# Patient Record
Sex: MALE | Race: WHITE | NOT HISPANIC OR LATINO | Employment: OTHER | URBAN - METROPOLITAN AREA
[De-identification: names, ages, dates, MRNs, and addresses within clinical notes are randomized per-mention and may not be internally consistent; named-entity substitution may affect disease eponyms.]

---

## 2023-03-13 ENCOUNTER — HOSPITAL ENCOUNTER (EMERGENCY)
Facility: HOSPITAL | Age: 53
Discharge: HOME | End: 2023-03-13
Attending: EMERGENCY MEDICINE

## 2023-03-13 VITALS
DIASTOLIC BLOOD PRESSURE: 73 MMHG | HEIGHT: 67 IN | BODY MASS INDEX: 16.01 KG/M2 | HEART RATE: 97 BPM | OXYGEN SATURATION: 98 % | RESPIRATION RATE: 18 BRPM | WEIGHT: 102 LBS | SYSTOLIC BLOOD PRESSURE: 132 MMHG | TEMPERATURE: 98.7 F

## 2023-03-13 DIAGNOSIS — Z51.89 VISIT FOR WOUND CHECK: Primary | ICD-10-CM

## 2023-03-14 NOTE — ED PROVIDER NOTES
History  Chief Complaint   Patient presents with   • Wound Check     Patient presents to the ER with:  "I want to get the dressings changed on my sacrum "     Patient is a 50year old male who presents for a dressing change of chronic sacral wounds  Denies change in drainage or smell of wounds  Denies fevers, chills, fatigue, weakness  None       Past Medical History:   Diagnosis Date   • Osteomyelitis [facility])        Past Surgical History:   Procedure Laterality Date   • BELOW KNEE LEG AMPUTATION Left     March, 2022   • FEMUR FRACTURE SURGERY Right    • HIP ARTHROSCOPY Right    • LEG SURGERY Right     Tib,Fib   • SPINAL CORD DECOMPRESSION         History reviewed  No pertinent family history  I have reviewed and agree with the history as documented  E-Cigarette/Vaping   • E-Cigarette Use Never User      E-Cigarette/Vaping Substances     Social History     Tobacco Use   • Smoking status: Every Day     Types: Cigarettes   • Smokeless tobacco: Never   Vaping Use   • Vaping Use: Never used   Substance Use Topics   • Alcohol use: Never   • Drug use: Never       Review of Systems   Constitutional: Negative for chills and fever  Gastrointestinal: Negative for nausea and vomiting  Skin: Positive for wound  Negative for color change, pallor and rash  Physical Exam  Physical Exam  Vitals and nursing note reviewed  Constitutional:       General: He is not in acute distress  Appearance: Normal appearance  He is normal weight  He is ill-appearing (chronically ill appearing)  He is not toxic-appearing  HENT:      Head: Normocephalic and atraumatic  Nose: Nose normal       Mouth/Throat:      Mouth: Mucous membranes are moist    Eyes:      Extraocular Movements: Extraocular movements intact  Conjunctiva/sclera: Conjunctivae normal       Pupils: Pupils are equal, round, and reactive to light  Cardiovascular:      Rate and Rhythm: Normal rate and regular rhythm     Pulmonary:      Effort: Pulmonary effort is normal  No respiratory distress  Breath sounds: Normal breath sounds  No stridor  No wheezing, rhonchi or rales  Chest:      Chest wall: No tenderness  Abdominal:      General: Abdomen is flat  There is no distension  Tenderness: There is no abdominal tenderness  There is no guarding or rebound  Hernia: No hernia is present  Comments: Ostomy site clean and dry    Genitourinary:     Comments: See photos for wound description   Musculoskeletal:      Comments: S/p L BKA   Skin:     General: Skin is warm and dry  Neurological:      General: No focal deficit present  Mental Status: He is alert and oriented to person, place, and time  Mental status is at baseline  Psychiatric:         Mood and Affect: Mood normal          Behavior: Behavior normal              Vital Signs  ED Triage Vitals [03/13/23 1713]   Temperature Pulse Respirations Blood Pressure SpO2   98 7 °F (37 1 °C) 88 18 149/76 98 %      Temp Source Heart Rate Source Patient Position - Orthostatic VS BP Location FiO2 (%)   Oral Monitor Sitting Left arm --      Pain Score       4           Vitals:    03/13/23 1713 03/13/23 1958   BP: 149/76 132/73   Pulse: 88 97   Patient Position - Orthostatic VS: Sitting Sitting         Visual Acuity      ED Medications  Medications - No data to display    Diagnostic Studies  Results Reviewed     None                 No orders to display              Procedures  Procedures         ED Course                                             Medical Decision Making    Assessment and Plan:   Patient brought his own allevyn dressings  Wounds appear chronic  Do not appear to be actively infected  Photo in chart  Removed old dressing, RN cleaned up the wounds, and placed new dressings onto the 3 sacral wounds  Was also given a new urine leg bag for his catheter   Patient reports that he has a follow up appointment tomorrow at 1pm with the wound clinic which is encouraged he keep     Review of medical record shows that the patient has a past medical history significant for paraplegia, chronic sacral wounds, status post BKA, has a diverting colostomy  Review of note from 3/8/2023 from care everywhere at outside hospital shows that patient presented for wound care  He was discharged from SNF on 3/6 and is staying at a friend's house  While at the SNF he was getting wound care, but currently he is unable to get the wound dressing changed  He does have an appointment with wound clinic tomorrow, but needed dressings to be changed more urgently because the serous drainage had seeped through and saturated his clothes  Disposition  Final diagnoses:   Visit for wound check     Time reflects when diagnosis was documented in both MDM as applicable and the Disposition within this note     Time User Action Codes Description Comment    3/13/2023 10:45 PM Charlette Rivera Add [Z51 89] Visit for wound check       ED Disposition     ED Disposition   Discharge    Condition   Stable    Date/Time   Mon Mar 13, 2023 10:45 PM    Comment   Casi Gonzalez [facility] discharge to home/self care  Follow-up Information     Follow up With Specialties Details Why Contact Info Additional [address]   tomorrow for your scheduled appointment       [facility] Emergency Department Emergency Medicine Go to  As needed, If symptoms worsen, for re-evaluation [address] [place], [id number]  [address] [facility] Emergency Department, [address] [place], [place] [address]Yvonne malhotra [address]          Patient's Medications    No medications on file       No discharge procedures on file      PDMP Review     None          ED Provider  Electronically Signed by           Ash Ayers DO  03/13/23 4124

## 2023-03-14 NOTE — ED NOTES
Dressing to lower back and buttock cleaned and new mepilex applied       Babita Brito RN  03/13/23 0039

## 2023-03-23 ENCOUNTER — APPOINTMENT (EMERGENCY)
Dept: CT IMAGING | Facility: HOSPITAL | Age: 53
End: 2023-03-23

## 2023-03-23 ENCOUNTER — HOSPITAL ENCOUNTER (INPATIENT)
Facility: HOSPITAL | Age: 53
LOS: 7 days | Discharge: NURSING, REHAB OR LONG-TERM CARE | End: 2023-03-31
Attending: EMERGENCY MEDICINE | Admitting: STUDENT IN AN ORGANIZED HEALTH CARE EDUCATION/TRAINING PROGRAM

## 2023-03-23 ENCOUNTER — APPOINTMENT (EMERGENCY)
Dept: RADIOLOGY | Facility: HOSPITAL | Age: 53
End: 2023-03-23

## 2023-03-23 DIAGNOSIS — N30.90 CYSTITIS: Primary | ICD-10-CM

## 2023-03-23 DIAGNOSIS — A41.9 SEPSIS (HCC): ICD-10-CM

## 2023-03-23 DIAGNOSIS — S31.000D SACRAL WOUND, SUBSEQUENT ENCOUNTER: ICD-10-CM

## 2023-03-23 DIAGNOSIS — Z72.0 TOBACCO USE: ICD-10-CM

## 2023-03-23 DIAGNOSIS — L08.9 WOUND INFECTION: ICD-10-CM

## 2023-03-23 DIAGNOSIS — R63.6 UNDERWEIGHT: ICD-10-CM

## 2023-03-23 DIAGNOSIS — N12 PYELONEPHRITIS: ICD-10-CM

## 2023-03-23 DIAGNOSIS — L89.619 PRESSURE INJURY OF SKIN OF RIGHT HEEL, UNSPECIFIED INJURY STAGE: ICD-10-CM

## 2023-03-23 DIAGNOSIS — L89.159 SACRAL PRESSURE ULCER: ICD-10-CM

## 2023-03-23 DIAGNOSIS — T14.8XXA WOUND INFECTION: ICD-10-CM

## 2023-03-23 DIAGNOSIS — E46 PROTEIN-CALORIE MALNUTRITION, UNSPECIFIED SEVERITY (HCC): ICD-10-CM

## 2023-03-23 LAB
ALBUMIN SERPL BCP-MCNC: 3.1 G/DL (ref 3.5–5)
ALP SERPL-CCNC: 99 U/L (ref 34–104)
ALT SERPL W P-5'-P-CCNC: 5 U/L (ref 7–52)
ANION GAP SERPL CALCULATED.3IONS-SCNC: 9 MMOL/L (ref 4–13)
APTT PPP: 47 SECONDS (ref 23–37)
AST SERPL W P-5'-P-CCNC: 8 U/L (ref 13–39)
BACTERIA UR QL AUTO: ABNORMAL /HPF
BASOPHILS # BLD AUTO: 0.01 THOUSANDS/ÂΜL (ref 0–0.1)
BASOPHILS NFR BLD AUTO: 0 % (ref 0–1)
BILIRUB SERPL-MCNC: 0.29 MG/DL (ref 0.2–1)
BILIRUB UR QL STRIP: NEGATIVE
BUN SERPL-MCNC: 6 MG/DL (ref 5–25)
CALCIUM ALBUM COR SERPL-MCNC: 9.1 MG/DL (ref 8.3–10.1)
CALCIUM SERPL-MCNC: 8.4 MG/DL (ref 8.4–10.2)
CHLORIDE SERPL-SCNC: 98 MMOL/L (ref 96–108)
CLARITY UR: ABNORMAL
CO2 SERPL-SCNC: 23 MMOL/L (ref 21–32)
COLOR UR: YELLOW
CREAT SERPL-MCNC: 0.51 MG/DL (ref 0.6–1.3)
CRP SERPL QL: 129.3 MG/L
EOSINOPHIL # BLD AUTO: 0.07 THOUSAND/ÂΜL (ref 0–0.61)
EOSINOPHIL NFR BLD AUTO: 1 % (ref 0–6)
ERYTHROCYTE [DISTWIDTH] IN BLOOD BY AUTOMATED COUNT: 16.8 % (ref 11.6–15.1)
ERYTHROCYTE [SEDIMENTATION RATE] IN BLOOD: 100 MM/HOUR (ref 0–19)
GFR SERPL CREATININE-BSD FRML MDRD: 125 ML/MIN/1.73SQ M
GLUCOSE SERPL-MCNC: 120 MG/DL (ref 65–140)
GLUCOSE UR STRIP-MCNC: NEGATIVE MG/DL
HCT VFR BLD AUTO: 32 % (ref 36.5–49.3)
HGB BLD-MCNC: 10.7 G/DL (ref 12–17)
HGB UR QL STRIP.AUTO: ABNORMAL
IMM GRANULOCYTES # BLD AUTO: 0.09 THOUSAND/UL (ref 0–0.2)
IMM GRANULOCYTES NFR BLD AUTO: 1 % (ref 0–2)
INR PPP: 1.04 (ref 0.84–1.19)
KETONES UR STRIP-MCNC: ABNORMAL MG/DL
LACTATE SERPL-SCNC: 1 MMOL/L (ref 0.5–2)
LEUKOCYTE ESTERASE UR QL STRIP: ABNORMAL
LYMPHOCYTES # BLD AUTO: 1.42 THOUSANDS/ÂΜL (ref 0.6–4.47)
LYMPHOCYTES NFR BLD AUTO: 10 % (ref 14–44)
MCH RBC QN AUTO: 27.8 PG (ref 26.8–34.3)
MCHC RBC AUTO-ENTMCNC: 33.4 G/DL (ref 31.4–37.4)
MCV RBC AUTO: 83 FL (ref 82–98)
MONOCYTES # BLD AUTO: 0.87 THOUSAND/ÂΜL (ref 0.17–1.22)
MONOCYTES NFR BLD AUTO: 6 % (ref 4–12)
NEUTROPHILS # BLD AUTO: 12.37 THOUSANDS/ÂΜL (ref 1.85–7.62)
NEUTS SEG NFR BLD AUTO: 82 % (ref 43–75)
NITRITE UR QL STRIP: POSITIVE
NON-SQ EPI CELLS URNS QL MICRO: ABNORMAL /HPF
NRBC BLD AUTO-RTO: 0 /100 WBCS
PH UR STRIP.AUTO: 7.5 [PH]
PLATELET # BLD AUTO: 941 THOUSANDS/UL (ref 149–390)
PMV BLD AUTO: 7.8 FL (ref 8.9–12.7)
POTASSIUM SERPL-SCNC: 3.7 MMOL/L (ref 3.5–5.3)
PROCALCITONIN SERPL-MCNC: 50.61 NG/ML
PROT SERPL-MCNC: 7.2 G/DL (ref 6.4–8.4)
PROT UR STRIP-MCNC: ABNORMAL MG/DL
PROTHROMBIN TIME: 14.3 SECONDS (ref 11.6–14.5)
RBC # BLD AUTO: 3.85 MILLION/UL (ref 3.88–5.62)
RBC #/AREA URNS AUTO: ABNORMAL /HPF
SODIUM SERPL-SCNC: 130 MMOL/L (ref 135–147)
SP GR UR STRIP.AUTO: 1.02 (ref 1–1.03)
UROBILINOGEN UR STRIP-ACNC: <2 MG/DL
WBC # BLD AUTO: 14.83 THOUSAND/UL (ref 4.31–10.16)
WBC #/AREA URNS AUTO: ABNORMAL /HPF

## 2023-03-23 RX ORDER — HYDROMORPHONE HCL/PF 1 MG/ML
0.5 SYRINGE (ML) INJECTION ONCE
Status: COMPLETED | OUTPATIENT
Start: 2023-03-23 | End: 2023-03-23

## 2023-03-23 RX ADMIN — PIPERACILLIN SODIUM AND TAZOBACTAM SODIUM 3.38 G: 3; .375 INJECTION, POWDER, LYOPHILIZED, FOR SOLUTION INTRAVENOUS at 22:19

## 2023-03-23 RX ADMIN — HYDROMORPHONE HYDROCHLORIDE 0.5 MG: 1 INJECTION, SOLUTION INTRAMUSCULAR; INTRAVENOUS; SUBCUTANEOUS at 21:36

## 2023-03-23 RX ADMIN — IOHEXOL 100 ML: 350 INJECTION, SOLUTION INTRAVENOUS at 22:51

## 2023-03-23 RX ADMIN — VANCOMYCIN HYDROCHLORIDE 1250 MG: 5 INJECTION, POWDER, LYOPHILIZED, FOR SOLUTION INTRAVENOUS at 22:59

## 2023-03-23 RX ADMIN — SODIUM CHLORIDE 1000 ML: 0.9 INJECTION, SOLUTION INTRAVENOUS at 22:19

## 2023-03-24 PROBLEM — A41.9 SEPSIS WITHOUT ACUTE ORGAN DYSFUNCTION (HCC): Status: ACTIVE | Noted: 2023-03-24

## 2023-03-24 PROBLEM — G82.20 PARAPLEGIA (HCC): Status: ACTIVE | Noted: 2021-03-23

## 2023-03-24 PROBLEM — S31.000D SACRAL WOUND, SUBSEQUENT ENCOUNTER: Status: ACTIVE | Noted: 2023-03-24

## 2023-03-24 PROBLEM — N30.90 CYSTITIS: Status: ACTIVE | Noted: 2023-03-24

## 2023-03-24 PROBLEM — Z89.512 S/P BKA (BELOW KNEE AMPUTATION), LEFT (HCC): Status: ACTIVE | Noted: 2023-03-24

## 2023-03-24 PROBLEM — Z93.3 STATUS POST COLOSTOMY (HCC): Status: ACTIVE | Noted: 2021-11-27

## 2023-03-24 PROBLEM — G40.909 EPILEPSY (HCC): Status: ACTIVE | Noted: 2021-03-23

## 2023-03-24 PROBLEM — Z86.718 PERSONAL HISTORY OF VENOUS THROMBOSIS AND EMBOLISM: Status: ACTIVE | Noted: 2021-03-23

## 2023-03-24 PROBLEM — E87.1 HYPONATREMIA: Status: ACTIVE | Noted: 2022-03-05

## 2023-03-24 LAB
ANION GAP SERPL CALCULATED.3IONS-SCNC: 7 MMOL/L (ref 4–13)
ATRIAL RATE: 100 BPM
BUN SERPL-MCNC: 6 MG/DL (ref 5–25)
CALCIUM SERPL-MCNC: 7.7 MG/DL (ref 8.4–10.2)
CHLORIDE SERPL-SCNC: 103 MMOL/L (ref 96–108)
CO2 SERPL-SCNC: 21 MMOL/L (ref 21–32)
CREAT SERPL-MCNC: 0.47 MG/DL (ref 0.6–1.3)
ERYTHROCYTE [DISTWIDTH] IN BLOOD BY AUTOMATED COUNT: 17.2 % (ref 11.6–15.1)
GFR SERPL CREATININE-BSD FRML MDRD: 129 ML/MIN/1.73SQ M
GLUCOSE SERPL-MCNC: 95 MG/DL (ref 65–140)
HCT VFR BLD AUTO: 28.1 % (ref 36.5–49.3)
HGB BLD-MCNC: 9.1 G/DL (ref 12–17)
MAGNESIUM SERPL-MCNC: 1.9 MG/DL (ref 1.9–2.7)
MCH RBC QN AUTO: 27.6 PG (ref 26.8–34.3)
MCHC RBC AUTO-ENTMCNC: 32.4 G/DL (ref 31.4–37.4)
MCV RBC AUTO: 85 FL (ref 82–98)
P AXIS: 49 DEGREES
PLATELET # BLD AUTO: 822 THOUSANDS/UL (ref 149–390)
PMV BLD AUTO: 7.9 FL (ref 8.9–12.7)
POTASSIUM SERPL-SCNC: 3.5 MMOL/L (ref 3.5–5.3)
PR INTERVAL: 104 MS
PROCALCITONIN SERPL-MCNC: 43.37 NG/ML
QRS AXIS: 82 DEGREES
QRSD INTERVAL: 82 MS
QT INTERVAL: 358 MS
QTC INTERVAL: 461 MS
RBC # BLD AUTO: 3.3 MILLION/UL (ref 3.88–5.62)
SODIUM SERPL-SCNC: 131 MMOL/L (ref 135–147)
T WAVE AXIS: 60 DEGREES
VENTRICULAR RATE: 100 BPM
WBC # BLD AUTO: 11.67 THOUSAND/UL (ref 4.31–10.16)

## 2023-03-24 RX ORDER — ENOXAPARIN SODIUM 100 MG/ML
30 INJECTION SUBCUTANEOUS DAILY
Status: DISCONTINUED | OUTPATIENT
Start: 2023-03-24 | End: 2023-03-31 | Stop reason: HOSPADM

## 2023-03-24 RX ORDER — SENNOSIDES 8.6 MG
1 TABLET ORAL
Status: DISCONTINUED | OUTPATIENT
Start: 2023-03-24 | End: 2023-03-31 | Stop reason: HOSPADM

## 2023-03-24 RX ORDER — ONDANSETRON 2 MG/ML
4 INJECTION INTRAMUSCULAR; INTRAVENOUS EVERY 4 HOURS PRN
Status: DISCONTINUED | OUTPATIENT
Start: 2023-03-24 | End: 2023-03-31 | Stop reason: HOSPADM

## 2023-03-24 RX ORDER — CEFEPIME HYDROCHLORIDE 2 G/50ML
2000 INJECTION, SOLUTION INTRAVENOUS EVERY 12 HOURS
Status: DISCONTINUED | OUTPATIENT
Start: 2023-03-24 | End: 2023-03-26

## 2023-03-24 RX ORDER — VANCOMYCIN HYDROCHLORIDE 1 G/200ML
1000 INJECTION, SOLUTION INTRAVENOUS EVERY 12 HOURS
Status: DISCONTINUED | OUTPATIENT
Start: 2023-03-24 | End: 2023-03-26

## 2023-03-24 RX ORDER — OXYCODONE HYDROCHLORIDE 5 MG/1
5 TABLET ORAL EVERY 4 HOURS PRN
Status: DISCONTINUED | OUTPATIENT
Start: 2023-03-24 | End: 2023-03-31 | Stop reason: HOSPADM

## 2023-03-24 RX ORDER — ONDANSETRON 2 MG/ML
4 INJECTION INTRAMUSCULAR; INTRAVENOUS EVERY 6 HOURS PRN
Status: DISCONTINUED | OUTPATIENT
Start: 2023-03-24 | End: 2023-03-24

## 2023-03-24 RX ORDER — OXYCODONE HYDROCHLORIDE 5 MG/1
5 TABLET ORAL EVERY 6 HOURS PRN
Status: DISCONTINUED | OUTPATIENT
Start: 2023-03-24 | End: 2023-03-24

## 2023-03-24 RX ORDER — MAGNESIUM HYDROXIDE/ALUMINUM HYDROXICE/SIMETHICONE 120; 1200; 1200 MG/30ML; MG/30ML; MG/30ML
30 SUSPENSION ORAL EVERY 6 HOURS PRN
Status: DISCONTINUED | OUTPATIENT
Start: 2023-03-24 | End: 2023-03-31 | Stop reason: HOSPADM

## 2023-03-24 RX ORDER — HYDROMORPHONE HCL/PF 1 MG/ML
0.5 SYRINGE (ML) INJECTION
Status: DISCONTINUED | OUTPATIENT
Start: 2023-03-24 | End: 2023-03-31 | Stop reason: HOSPADM

## 2023-03-24 RX ORDER — ACETAMINOPHEN 325 MG/1
650 TABLET ORAL EVERY 6 HOURS PRN
Status: DISCONTINUED | OUTPATIENT
Start: 2023-03-24 | End: 2023-03-31 | Stop reason: HOSPADM

## 2023-03-24 RX ADMIN — Medication 3.38 G: at 10:16

## 2023-03-24 RX ADMIN — Medication 3.38 G: at 03:22

## 2023-03-24 RX ADMIN — OXYCODONE HYDROCHLORIDE 5 MG: 5 TABLET ORAL at 04:15

## 2023-03-24 RX ADMIN — VANCOMYCIN HYDROCHLORIDE 1000 MG: 1 INJECTION, SOLUTION INTRAVENOUS at 11:13

## 2023-03-24 RX ADMIN — OXYCODONE HYDROCHLORIDE 5 MG: 5 TABLET ORAL at 10:18

## 2023-03-24 RX ADMIN — ENOXAPARIN SODIUM 30 MG: 100 INJECTION SUBCUTANEOUS at 09:07

## 2023-03-24 RX ADMIN — CEFEPIME HYDROCHLORIDE 2000 MG: 2 INJECTION, SOLUTION INTRAVENOUS at 16:42

## 2023-03-24 RX ADMIN — COLLAGENASE SANTYL: 250 OINTMENT TOPICAL at 13:16

## 2023-03-24 RX ADMIN — OXYCODONE HYDROCHLORIDE 5 MG: 5 TABLET ORAL at 16:40

## 2023-03-24 NOTE — PROGRESS NOTES
Chelsea Martínez is a 50 y o  male who is currently ordered Vancomycin IV with management by the Pharmacy Consult service  Relevant clinical data and objective / subjective history reviewed  Vancomycin Assessment:  Indication and Goal AUC/Trough: Soft tissue (goal -600, trough >10)  Clinical Status:  New  Micro:   Pending  Renal Function:  SCr: 0 51 mg/dL  CrCl: 116 7 mL/min  Renal replacement: Not on dialysis  Days of Therapy: 1  Current Dose: 1250mg once loading dose  Vancomycin Plan:  New Dosinmg every 12 hours  Estimated AUC: 456 mcg*hr/mL  Estimated Trough: 12 mcg/mL  Next Level: 3/25/23 at 0600  Renal Function Monitoring: Daily BMP and [place] will continue to follow closely for s/sx of nephrotoxicity, infusion reactions and appropriateness of therapy  BMP and CBC will be ordered per protocol  We will continue to follow the patient’s culture results and clinical progress daily  Also, zosyn will be adjusted renally if necessary      Alex Martin, Pharmacist, PharmD, BCPS

## 2023-03-24 NOTE — ASSESSMENT & PLAN NOTE
· Status post work injury  · Fairly independent with self-reported wheelchair and handicap vehicle  · Unable to care for sacral wounds himself at home, looking for placement until wounds are healed  · CM consulted

## 2023-03-24 NOTE — CONSULTS
Consultation - General Surgery   Marivel Deion Yovanny 50 y o  male MRN: [id number]  Unit/Bed#: -01 Encounter: [id number]    Assessment/Plan      Multiple sacral decubitus ulcers, Stage 4  -Wounds evaluated at bedside today, wounds do probe to bone, midline sacral, left ischial and right ischial wounds, areas with pink tissue and serous drainage, thick slough at base, wiped with 4 x 4, no areas of necrosis requiring debridement (see pictures in chart)  -Likely chronic osteomyelitis as confirmed by CT scan  -Would add Santyl to wound bases, Maxorb for absorption of drainage, continue Allevyn padding  -Patient is being evaluated for SNF placement, would continue Santyl for enzymatic debridement of wounds at this time, can consider VAC placement as per wound care service at nursing facility  -Patient not likely a candidate for plastics revision/flap as patient is paraplegic, difficulty with offloading to the areas, and does have osteomyelitis  -continue to monitor wounds, [address] as ordered    Cystitis  -Patient uses condom catheter  -CT with bladder wall thickening and enhancement of the left renal pelvis and ureter concerning for cystitis and ascending ureteritis/infection  -Antibiotics per primary service    Sepsis  -Likely related to cystitis  -Sacral wounds do not appear infected, osteomyelitis likely chronic  -Antibiotics per primary service  -Follow blood culture    Underweight, malnourished  -Patient weighs 88 pounds  -Place nutrition consult for optimization  -Patient needs adequate calories and protein for optimal healing    Social issues  -Patient is homeless with difficulty caring for self  -CM consult    Paraplegia, L BKA, colostomy in place, IVC filter, Anemia          History of Present Illness     HPI:  Guido Robbins is a 50 y o  male with PMH of paraplegia, left BKA, colostomy, IVC filter, and chronic multiple pressure wounds who presents after recent discharge from rehab facility with plaints of worsening sacral wounds and drainage over the past week  He is reportedly homeless and has been living in his car  States he is unable to perform dressing changes himself  He denies any fevers or chills, no nausea or vomiting  Denies any issues with ostomy  No issues with urination, patient does use a condom catheter  Inpatient consult to Acute Care Surgery  Consult performed by: Maral Contreras PA-C  Consult ordered by: Alexa Walker PA-C          Review of Systems   Constitutional: Negative  Negative for appetite change, fever and unexpected weight change  HENT: Negative  Eyes: Negative  Respiratory: Negative  Negative for cough and shortness of breath  Cardiovascular: Negative  Negative for chest pain and palpitations  Gastrointestinal: Negative  Negative for abdominal pain, diarrhea, nausea and vomiting  Endocrine: Negative  Genitourinary: Negative  Negative for difficulty urinating and hematuria  Musculoskeletal: Positive for arthralgias, back pain and gait problem  Paraplegia   Skin: Positive for wound  Allergic/Immunologic: Negative  Neurological: Negative for weakness and light-headedness  Hematological: Negative  Does not bruise/bleed easily  Psychiatric/Behavioral: Negative  All other systems reviewed and are negative        Historical Information   Past Medical History:   Diagnosis Date   • Osteomyelitis [facility])      Past Surgical History:   Procedure Laterality Date   • BELOW KNEE LEG AMPUTATION Left     March, 2022   • FEMUR FRACTURE SURGERY Right    • HIP ARTHROSCOPY Right    • LEG SURGERY Right     Tib,Fib   • SPINAL CORD DECOMPRESSION       Social History   Social History     Substance and Sexual Activity   Alcohol Use Never     Social History     Substance and Sexual Activity   Drug Use Never     E-Cigarette/Vaping   • E-Cigarette Use Never User      E-Cigarette/Vaping Substances     Social History     Tobacco Use   Smoking Status Every Day "  • Types: Cigarettes   • Passive exposure: Never   Smokeless Tobacco Never     Family History: non-contributory    Meds/Allergies   all current active meds have been reviewed  Allergies   Allergen Reactions   • Levaquin [Levofloxacin] Rash   • Morphine Rash       Objective   First Vitals:   Blood Pressure: 125/70 (03/23/23 1755)  Pulse: (!) 112 (03/23/23 1755)  Temperature: 98 1 °F (36 7 °C) (03/23/23 1755)  Temp Source: Temporal (03/23/23 1755)  Respirations: 20 (03/23/23 1755)  Height: 5' 7\" (170 2 cm) (03/23/23 1755)  Weight - Scale: 47 6 kg (105 lb) (03/23/23 1755)  SpO2: 98 % (03/23/23 1755)    Current Vitals:   Blood Pressure: 91/55 (03/24/23 0700)  Pulse: 78 (03/24/23 0700)  Temperature: 98 8 °F (37 1 °C) (03/24/23 0700)  Temp Source: Temporal (03/23/23 1755)  Respirations: 14 (03/24/23 0700)  Height: 5' 7\" (170 2 cm) (03/23/23 1755)  Weight - Scale: 40 2 kg (88 lb 10 oz) (03/24/23 0300)  SpO2: 97 % (03/24/23 0700)      Intake/Output Summary (Last 24 hours) at 3/24/2023 0920  Last data filed at 3/24/2023 0102  Gross per 24 hour   Intake 1100 ml   Output --   Net 1100 ml       Invasive Devices     Peripheral Intravenous Line  Duration           Peripheral IV 03/23/23 Dorsal (posterior); Left Forearm <1 day    Peripheral IV 03/23/23 Dorsal (posterior); Left Hand <1 day          Drain  Duration           Ileostomy LLQ -- days                Physical Exam  Constitutional:       General: He is not in acute distress  Appearance: He is well-developed  He is not diaphoretic  Comments: Tachypneic, frail-appearing, appears older than stated age   HENT:      Head: Normocephalic and atraumatic  Mouth/Throat:      Pharynx: No oropharyngeal exudate  Eyes:      General: No scleral icterus  Right eye: No discharge  Left eye: No discharge  Neck:      Thyroid: No thyromegaly  Vascular: No JVD  Trachea: No tracheal deviation     Cardiovascular:      Rate and Rhythm: Normal rate and " regular rhythm  Heart sounds: Normal heart sounds  No murmur heard  Pulmonary:      Effort: Pulmonary effort is normal  No respiratory distress  Breath sounds: Normal breath sounds  No wheezing  Abdominal:      General: Bowel sounds are normal  There is no distension  Palpations: Abdomen is soft  Tenderness: There is no abdominal tenderness  Musculoskeletal:         General: No deformity  Cervical back: Normal range of motion and neck supple  Comments: Paraplegia with contracted lower extremities, BKA   Skin:     General: Skin is warm and dry  Comments: Multiple sacral wounds, midline area and over ischial areas, serous drainage with slough at base, otherwise tissues are pink without necrosis, no purulence, no induration or fluctuance   Neurological:      Mental Status: He is alert and oriented to person, place, and time  Comments: No focal deficits   Psychiatric:         Behavior: Behavior normal          Lab Results:   I have personally reviewed pertinent lab results  , CBC:   Lab Results   Component Value Date    WBC 11 67 (H) 03/24/2023    HGB 9 1 (L) 03/24/2023    HCT 28 1 (L) 03/24/2023    MCV 85 03/24/2023     (H) 03/24/2023    MCH 27 6 03/24/2023    MCHC 32 4 03/24/2023    RDW 17 2 (H) 03/24/2023    MPV 7 9 (L) 03/24/2023    NRBC 0 03/23/2023   , CMP:   Lab Results   Component Value Date    SODIUM 131 (L) 03/24/2023    K 3 5 03/24/2023     03/24/2023    CO2 21 03/24/2023    BUN 6 03/24/2023    CREATININE 0 47 (L) 03/24/2023    CALCIUM 7 7 (L) 03/24/2023    AST 8 (L) 03/23/2023    ALT 5 (L) 03/23/2023    ALKPHOS 99 03/23/2023    EGFR 129 03/24/2023     Imaging: I have personally reviewed pertinent reports  EKG, Pathology, and Other Studies: I have personally reviewed pertinent reports  Counseling / Coordination of Care  Total floor / unit time spent today 30 minutes    Greater than 50% of total time was spent with the patient and / or family counseling and / or coordination of care    A description of the counseling / coordination of care    [facility] Lonnie

## 2023-03-24 NOTE — PLAN OF CARE
Problem: MOBILITY - ADULT  Goal: Maintain or return to baseline ADL function  Description: INTERVENTIONS:  -  Assess patient's ability to carry out ADLs; assess patient's baseline for ADL function and identify physical deficits which impact ability to perform ADLs (bathing, care of mouth/teeth, toileting, grooming, dressing, etc )  - Assess/evaluate cause of self-care deficits   - Assess range of motion  - Assess patient's mobility; develop plan if impaired  - Assess patient's need for assistive devices and provide as appropriate  - Encourage maximum independence but intervene and supervise when necessary  - Involve family in performance of ADLs  - Assess for home care needs following discharge   - Consider OT consult to assist with ADL evaluation and planning for discharge  - Provide patient education as appropriate  Outcome: Progressing  Goal: Maintains/Returns to pre admission functional level  Description: INTERVENTIONS:  - Perform BMAT or MOVE assessment daily    - Set and communicate daily mobility goal to care team and patient/family/caregiver  - Collaborate with rehabilitation services on mobility goals if consulted  - Perform Range of Motion 3 times a day  - Reposition patient every 3 hours    - Dangle patient 3 times a day  - Stand patient 3 times a day  - Ambulate patient 3 times a day  - Out of bed to chair 3 times a day   - Out of bed for meals 3 times a day  - Out of bed for toileting  - Record patient progress and toleration of activity level   Outcome: Progressing     Problem: PAIN - ADULT  Goal: Verbalizes/displays adequate comfort level or baseline comfort level  Description: Interventions:  - Encourage patient to monitor pain and request assistance  - Assess pain using appropriate pain scale  - Administer analgesics based on type and severity of pain and evaluate response  - Implement non-pharmacological measures as appropriate and evaluate response  - Consider cultural and social influences on pain and pain management  - Notify physician/advanced practitioner if interventions unsuccessful or patient reports new pain  Outcome: Progressing     Problem: INFECTION - ADULT  Goal: Absence or prevention of progression during hospitalization  Description: INTERVENTIONS:  - Assess and monitor for signs and symptoms of infection  - Monitor lab/diagnostic results  - Monitor all insertion sites, i e  indwelling lines, tubes, and drains  - Monitor endotracheal if appropriate and nasal secretions for changes in amount and color  - [place] appropriate cooling/warming therapies per order  - Administer medications as ordered  - Instruct and encourage patient and family to use good hand hygiene technique  - Identify and instruct in appropriate isolation precautions for identified infection/condition  Outcome: Progressing  Goal: Absence of fever/infection during neutropenic period  Description: INTERVENTIONS:  - Monitor WBC    Outcome: Progressing     Problem: SAFETY ADULT  Goal: Maintain or return to baseline ADL function  Description: INTERVENTIONS:  -  Assess patient's ability to carry out ADLs; assess patient's baseline for ADL function and identify physical deficits which impact ability to perform ADLs (bathing, care of mouth/teeth, toileting, grooming, dressing, etc )  - Assess/evaluate cause of self-care deficits   - Assess range of motion  - Assess patient's mobility; develop plan if impaired  - Assess patient's need for assistive devices and provide as appropriate  - Encourage maximum independence but intervene and supervise when necessary  - Involve family in performance of ADLs  - Assess for home care needs following discharge   - Consider OT consult to assist with ADL evaluation and planning for discharge  - Provide patient education as appropriate  Outcome: Progressing  Goal: Maintains/Returns to pre admission functional level  Description: INTERVENTIONS:  - Perform BMAT or MOVE assessment daily    - Set and communicate daily mobility goal to care team and patient/family/caregiver  - Collaborate with rehabilitation services on mobility goals if consulted  - Perform Range of Motion 3 times a day  - Reposition patient every 3 hours    - Dangle patient 3 times a day  - Stand patient 3 times a day  - Ambulate patient 3 times a day  - Out of bed to chair 3 times a day   - Out of bed for meals 3 times a day  - Out of bed for toileting  - Record patient progress and toleration of activity level   Outcome: Progressing  Goal: Patient will remain free of falls  Description: INTERVENTIONS:  - Educate patient/family on patient safety including physical limitations  - Instruct patient to call for assistance with activity   - Consult OT/PT to assist with strengthening/mobility   - Keep Call bell within reach  - Keep bed low and locked with side rails adjusted as appropriate  - Keep care items and personal belongings within reach  - Initiate and maintain comfort rounds  - Make Fall Risk Sign visible to staff  - Offer Toileting every 2 Hours, in advance of need  - Initiate/Maintain alarm  - Obtain necessary fall risk management equipment  - Apply yellow socks and bracelet for high fall risk patients  - Consider moving patient to room near nurses station  Outcome: Progressing     Problem: DISCHARGE PLANNING  Goal: Discharge to home or other facility with appropriate resources  Description: INTERVENTIONS:  - Identify barriers to discharge w/patient and caregiver  - Arrange for needed discharge resources and transportation as appropriate  - Identify discharge learning needs (meds, wound care, etc )  - Arrange for interpretive services to assist at discharge as needed  - Refer to Case Management Department for coordinating discharge planning if the patient needs post-hospital services based on physician/advanced practitioner order or complex needs related to functional status, cognitive ability, or social support system  Outcome: Progressing     Problem: Knowledge Deficit  Goal: Patient/family/caregiver demonstrates understanding of disease process, treatment plan, medications, and discharge instructions  Description: Complete learning assessment and assess knowledge base  Interventions:  - Provide teaching at level of understanding  - Provide teaching via preferred learning methods  Outcome: Progressing     Problem: Prexisting or High Potential for Compromised Skin Integrity  Goal: Skin integrity is maintained or improved  Description: INTERVENTIONS:  - Identify patients at risk for skin breakdown  - Assess and monitor skin integrity  - Assess and monitor nutrition and hydration status  - Monitor labs   - Assess for incontinence   - Turn and reposition patient  - Assist with mobility/ambulation  - Relieve pressure over bony prominences  - Avoid friction and shearing  - Provide appropriate hygiene as needed including keeping skin clean and dry  - Evaluate need for skin moisturizer/barrier cream  - Collaborate with interdisciplinary team   - Patient/family teaching  - Consider wound care consult   Outcome: Progressing     Problem: Nutrition/Hydration-ADULT  Goal: Nutrient/Hydration intake appropriate for improving, restoring or maintaining nutritional needs  Description: Monitor and assess patient's nutrition/hydration status for malnutrition  Collaborate with interdisciplinary team and initiate plan and interventions as ordered  Monitor patient's weight and dietary intake as ordered or per policy  Utilize nutrition screening tool and intervene as necessary  Determine patient's food preferences and provide high-protein, high-caloric foods as appropriate       INTERVENTIONS:  - Monitor oral intake, urinary output, labs, and treatment plans  - Assess nutrition and hydration status and recommend course of action  - Evaluate amount of meals eaten  - Assist patient with eating if necessary   - Allow adequate time for meals  - Recommend/ encourage appropriate diets, oral nutritional supplements, and vitamin/mineral supplements  - Order, calculate, and assess calorie counts as needed  - Recommend, monitor, and adjust tube feedings and TPN/PPN based on assessed needs  - Assess need for intravenous fluids  - Provide specific nutrition/hydration education as appropriate  - Include patient/family/caregiver in decisions related to nutrition  Outcome: Progressing

## 2023-03-24 NOTE — ASSESSMENT & PLAN NOTE
· Performed at [facility] 3/14/2022 by Dr Tee Arellano for definitive management of decubitus ulcer  · Presents with ulcer to RLE  · Podiatry consulted for lower extremity wounds

## 2023-03-24 NOTE — ASSESSMENT & PLAN NOTE
· SIRS criteria met on admission: Tachycardia and leukocytosis  · Suspected sources cystitis, as well as infected sacral wounds  · No severe sepsis criteria met  · Procalcitonin 50, recheck in a m    · Continue vancomycin and Zosyn for now  · Blood cultures x2, pending

## 2023-03-24 NOTE — ED CARE HANDOFF
Emergency Department Sign Out Note        Sign out and transfer of care from Dr Nataly Crocker  See Separate Emergency Department note  The patient, Trupti Yates, was evaluated by the previous provider for wounds      Workup Completed:  XR chest portable    (Results Pending)   CT abdomen pelvis with contrast    (Results Pending)      Labs Reviewed   CBC AND DIFFERENTIAL - Abnormal       Result Value Ref Range Status    WBC 14 83 (*) 4 31 - 10 16 Thousand/uL Final    RBC 3 85 (*) 3 88 - 5 62 Million/uL Final    Hemoglobin 10 7 (*) 12 0 - 17 0 g/dL Final    Hematocrit 32 0 (*) 36 5 - 49 3 % Final    MCV 83  82 - 98 fL Final    MCH 27 8  26 8 - 34 3 pg Final    MCHC 33 4  31 4 - 37 4 g/dL Final    RDW 16 8 (*) 11 6 - 15 1 % Final    MPV 7 8 (*) 8 9 - 12 7 fL Final    Platelets 328 (*) 450 - 390 Thousands/uL Final    nRBC 0  /100 WBCs Final    Neutrophils Relative 82 (*) 43 - 75 % Final    Immat GRANS % 1  0 - 2 % Final    Lymphocytes Relative 10 (*) 14 - 44 % Final    Monocytes Relative 6  4 - 12 % Final    Eosinophils Relative 1  0 - 6 % Final    Basophils Relative 0  0 - 1 % Final    Neutrophils Absolute 12 37 (*) 1 85 - 7 62 Thousands/µL Final    Immature Grans Absolute 0 09  0 00 - 0 20 Thousand/uL Final    Lymphocytes Absolute 1 42  0 60 - 4 47 Thousands/µL Final    Monocytes Absolute 0 87  0 17 - 1 22 Thousand/µL Final    Eosinophils Absolute 0 07  0 00 - 0 61 Thousand/µL Final    Basophils Absolute 0 01  0 00 - 0 10 Thousands/µL Final   COMPREHENSIVE METABOLIC PANEL - Abnormal    Sodium 130 (*) 135 - 147 mmol/L Final    Potassium 3 7  3 5 - 5 3 mmol/L Final    Chloride 98  96 - 108 mmol/L Final    CO2 23  21 - 32 mmol/L Final    ANION GAP 9  4 - 13 mmol/L Final    BUN 6  5 - 25 mg/dL Final    Creatinine 0 51 (*) 0 60 - 1 30 mg/dL Final    Comment: Standardized to IDMS reference method    Glucose 120  65 - 140 mg/dL Final    Comment: If the patient is fasting, the ADA then defines impaired fasting glucose as > 100 mg/dL and diabetes as > or equal to 123 mg/dL  Calcium 8 4  8 4 - 10 2 mg/dL Final    Corrected Calcium 9 1  8 3 - 10 1 mg/dL Final    AST 8 (*) 13 - 39 U/L Final    ALT 5 (*) 7 - 52 U/L Final    Comment: Specimen collection should occur prior to Sulfasalazine administration due to the potential for falsely depressed results  Alkaline Phosphatase 99  34 - 104 U/L Final    Total Protein 7 2  6 4 - 8 4 g/dL Final    Albumin 3 1 (*) 3 5 - 5 0 g/dL Final    Total Bilirubin 0 29  0 20 - 1 00 mg/dL Final    eGFR 125  ml/min/1 73sq m Final    Narrative:     [place] guidelines for Chronic Kidney Disease (CKD):   •  Stage 1 with normal or high GFR (GFR > 90 mL/min/1 73 square meters)  •  Stage 2 Mild CKD (GFR = 60-89 mL/min/1 73 square meters)  •  Stage 3A Moderate CKD (GFR = 45-59 mL/min/1 73 square meters)  •  Stage 3B Moderate CKD (GFR = 30-44 mL/min/1 73 square meters)  •  Stage 4 Severe CKD (GFR = 15-29 mL/min/1 73 square meters)  •  Stage 5 End Stage CKD (GFR <15 mL/min/1 73 square meters)  Note: GFR calculation is accurate only with a steady state creatinine   PROCALCITONIN TEST - Abnormal    Procalcitonin 50 61 (*) <=0 25 ng/ml Final    Comment: Suspected Lower Respiratory Tract Infection (LRTI):  - LESS than or EQUAL to 0 25 ng/mL:   low likelihood for bacterial LRTI; antibiotics DISCOURAGED   - GREATER than 0 25 ng/mL:   increased likelihood for bacterial LRTI; antibiotics ENCOURAGED  Suspected Sepsis:  - Strongly consider initiating antibiotics in ALL UNSTABLE patients  - LESS than or EQUAL to 0 5 ng/mL:   low likelihood for bacterial sepsis; antibiotics DISCOURAGED   - GREATER than 0 5 ng/mL:   increased likelihood for bacterial sepsis; antibiotics ENCOURAGED   - GREATER than 2 ng/mL:   high risk for severe sepsis / septic shock; antibiotics strongly ENCOURAGED  Decisions on antibiotic use should not be based solely on Procalcitonin (PCT) levels   If PCT is low but uncertainty exists with stopping antibiotics, repeat PCT in 6-24 hours to confirm the low level  If antibiotics are administered (regardless if initial PCT was high or low), repeat PCT every 1-2 days to consider early antibiotic cessation (when GREATER than 80% decrease from the peak OR when PCT drops below designated cutoffs, whichever comes first), so long as the infection is NOT one that typically requires prolonged treatment durations (e g , bone/joint infections, endocarditis, Staph  aureus bacteremia)      Situations of FALSE-POSITIVE Procalcitonin values:  1) Newborns < 67 hours old  2) Massive stress from severe trauma / burns, major surgery, acute pancreatitis, cardiogenic / hemorrhagic shock, sickle cell crisis, or other organ perfusion abnormalities  3) Malaria and some Candidal infections  4) Treatment with agents that stimulate cytokines (e g , OKT3, anti-lymphocyte globulins, alemtuzumab, IL-2, granulocyte transfusion [NOT GCSFs])  5) Chronic renal disease causes elevated baseline levels (consider GREATER than 0 75 ng/mL as an abnormal cut-off); initiating HD/CRRT may cause transient decreases  6) Paraneoplastic syndromes from medullary thyroid or SCLC, some forms of vasculitis, and acute [id number] disease    Situations of FALSE-NEGATIVE Procalcitonin values:  1) Too early in clinical course for PCT to have reached its peak (may repeat in 6-24 hours to confirm low level)  2) Localized infection WITHOUT systemic (SIRS / sepsis) response (e g , an abscess, osteomyelitis, cystitis)  3) Mycobacteria (e g , Tuberculosis, MAC)  4) Cystic fibrosis exacerbations     APTT - Abnormal    PTT 47 (*) 23 - 37 seconds Final    Comment: Therapeutic Heparin Range =  60-90 seconds   UA W REFLEX TO MICROSCOPIC WITH REFLEX TO CULTURE - Abnormal    Color, UA Yellow   Final    Clarity, UA Cloudy   Final    Specific Gravity, UA 1 025  1 005 - 1 030 Final    pH, UA 7 5  4 5, 5 0, 5 5, 6 0, 6 5, 7 0, 7 5, 8 0 Final Leukocytes, UA Large (*) Negative Final    Nitrite, UA Positive (*) Negative Final    Protein,  (2+) (*) Negative mg/dl Final    Glucose, UA Negative  Negative mg/dl Final    Ketones, UA 10 (1+) (*) Negative mg/dl Final    Urobilinogen, UA <2 0  <2 0 mg/dl mg/dl Final    Bilirubin, UA Negative  Negative Final    Occult Blood, UA Small (*) Negative Final   SEDIMENTATION RATE - Abnormal    Sed Rate 100 (*) 0 - 19 mm/hour Final   C-REACTIVE PROTEIN - Abnormal     3 (*) <3 0 mg/L Final   URINE MICROSCOPIC - Abnormal    RBC, UA Field obscured, unable to enumerate (*) None Seen, 0-1, 1-2, 2-4, 0-5 /hpf Final    WBC, UA Innumerable (*) None Seen, 0-1, 1-2, 0-5, 2-4 /hpf Final    Epithelial Cells Occasional  None Seen, Occasional /hpf Final    Bacteria, UA Innumerable (*) None Seen, Occasional /hpf Final   LACTIC ACID, PLASMA - Normal    LACTIC ACID 1 0  0 5 - 2 0 mmol/L Final    Narrative:     Result may be elevated if tourniquet was used during collection  PROTIME-INR - Normal    Protime 14 3  11 6 - 14 5 seconds Final    INR 1 04  0 84 - 1 19 Final   BLOOD CULTURE   BLOOD CULTURE   WOUND CULTURE   URINE CULTURE        ED Course / Workup Pending (followup):                                    ED Course as of 03/24/23 0029   Thu Mar 23, 2023   2205 S/o from Dr Lyndsay Castillo  CT pend, then admit to medicine  Medicine aware  2300 Urine noted - pt with chronic indwelling catheter  Already receiving abx  Fri Mar 24, 2023   0025 [facility] report: IVC filter, marked uniform urinary bladder wall thickening measuring 15 mm most consistent with severe infectious cystitis  LLQ ostomy  Discussed w/ SLIM for admit       Procedures  MDM        Disposition  Final diagnoses:   Cystitis   Wound infection   Sacral pressure ulcer   Sepsis ([address] )     Time reflects when diagnosis was documented in both MDM as applicable and the Disposition within this note     Time User Action Codes Description Comment    3/24/2023 12:27 AM Carson Chavez Pascual Saucedo Add [N30 90] Cystitis     3/24/2023 12:27 AM Rosebud Kail Add Marilou Blush  8XXA,  L08 9] Wound infection     3/24/2023 12:27 AM Mecca Hoff Add [L18 917] Sacral pressure ulcer     3/24/2023 12:27 AM Heather Kirklandserafin SERRANO Add [A41 9] Sepsis [facility])       ED Disposition     ED Disposition   Admit    Condition   Stable    Date/Time   Fri Mar 24, 2023 12:28 AM    Comment   Case was discussed with Edgard Worrell and the patient's admission status was agreed to be Admission Status: inpatient status to the service of Dr Diego Luciano    None       Patient's Medications    No medications on file     No discharge procedures on file         ED Provider  Electronically Signed by     Duane Pilling, MD  03/24/23 0030

## 2023-03-24 NOTE — ASSESSMENT & PLAN NOTE
· Utilizes external condom catheter  · UA: Interval WBCs and bacteria  · CT with evidence of severe cystitis  · Currently on vancomycin and Zosyn for infected sacral wounds -de-escalate antibiotics as able  · Place on urinary retention protocol

## 2023-03-24 NOTE — ASSESSMENT & PLAN NOTE
· 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  · Reports increasing size and drainage over the last 1 week  · [facility] makes no mention of depth of wounds  · Continue IV vancomycin and Zosyn for now  · Consult surgery for possible debridement

## 2023-03-24 NOTE — ASSESSMENT & PLAN NOTE
· Sodium at 130 on admission  · Baseline sodium ranges between 132-137  · Given 1 L normal saline bolus in the ER, recheck BMP this morning  · Hold further IV fluids for now

## 2023-03-24 NOTE — ASSESSMENT & PLAN NOTE
· Patient reports normal output of colostomy -unable to visualize stoma on admission as patient refuses ostomy bag change

## 2023-03-24 NOTE — CONSULTS
Consultation - Infectious Disease   Padmini Hairston 50 y o  male MRN: [id number]  Unit/Bed#: -01 Encounter: [id number]      Assessment/Plan     Paraplegia/leukocytosis/pyelonephritis/sacral decub/homelessness    Patient with paraplegia, multiple sacral wounds with exposed bone, came to ER with difficulty caring for himself as he is currently residing in his car  He had no fever on arrival, but had increased white blood cell count, a dirty [place] condom catheter, CTA and P, with expected osteomyelitis of the sacrum, left pyelonephritis, which I suspect is where the leukocytosis is coming from  Surgery has evaluated the wound, no plans for debridement  -  continue vancomycin  -  d/c zosyn, combination with vancomycin has been shown to cause acute renal failure   -  start cefepime for pyelonephritis   -  monitor for toxicities while on this medication   -  await urine culture  -  wound care, per wound care team and surgery  - This is a significant social issue, he needs a place to reside so that he can have proper wound care    Discussed with primary team    History of Present Illness   Physician Requesting Consult: Adalberto Kaminski MD  Reason for Consult / Principal Problem: decubiti    HPI: Malika Perkins is a 50y o  year old male with paraplegia, left BKA, he has been having issues with decubiti for some time  Most recently he was at [facility] in the fall where he had debridement, no IV antibiotics  He was discharged to a nursing facility  He was following with the trauma team there, he last saw them on January 31  Apparently he had to leave the nursing facility, not clear what the reason was for  He is homeless and living in his car  He has been unable to care for himself properly, and came to the ER  In the ER he had a white blood cell count of 11,000  Pro-Constantin of 50  He had a chronic [place] catheter on with small wound    His UA was positive as expected, apparently the [place] catheter had not been removed for some time  He had no fever  He had blood cultures that are negative  He did not have a chest x-ray, though has no pulmonary symptoms  He had a CT of the abdomen and pelvis, which showed stranding of the left renal pelvis and left ureter  Left lower quadrant ostomy with parastomal hernia, absence of distal sacrum and coccyx, periosteal reaction and erosion consistent with chronic sacral decubiti ,fracture of the left ilium extending to joint, which was present on exam from outside hospital, prior ORIF of the right femur  His bladder was thickened as well  Patient was admitted to the hospital, placed on Vanco and Zosyn  Surgery, wound care, and ID were consulted  Consults    ROS: 12 systems reviewed, remainder is neg  Historical Information   Past Medical History:   Diagnosis Date   • Osteomyelitis [facility])      Past Surgical History:   Procedure Laterality Date   • BELOW KNEE LEG AMPUTATION Left     March, 2022   • FEMUR FRACTURE SURGERY Right    • HIP ARTHROSCOPY Right    • LEG SURGERY Right     Tib,Fib   • SPINAL CORD DECOMPRESSION       Social History   Social History     Substance and Sexual Activity   Alcohol Use Never     Social History     Substance and Sexual Activity   Drug Use Never     Social History     Tobacco Use   Smoking Status Every Day   • Types: Cigarettes   • Passive exposure: Never   Smokeless Tobacco Never     History reviewed  No pertinent family history      Meds/Allergies   MEDS: reviewed       Current Facility-Administered Medications:   •  acetaminophen (TYLENOL) tablet 650 mg, 650 mg, Oral, Q6H PRN, Priyank Bronson PA-C  •  aluminum-magnesium hydroxide-simethicone (MYLANTA) oral suspension 30 mL, 30 mL, Oral, Q6H PRN, Priyank Bronson PA-C  •  enoxaparin (LOVENOX) subcutaneous injection 30 mg, 30 mg, Subcutaneous, Daily, Priyank Bronson PA-C, 30 mg at 03/24/23 0907  •  nicotine (NICODERM CQ) 7 mg/24hr TD 24 hr patch 1 patch, 1 patch, Transdermal, Daily PRN, [facility], PA-C  •  ondansetron [facility]) injection 4 mg, 4 mg, Intravenous, Q4H PRN, Paty Roe MD  •  oxyCODONE (ROXICODONE) split tablet 2 5 mg, 2 5 mg, Oral, Q6H PRN **OR** oxyCODONE (ROXICODONE) IR tablet 5 mg, 5 mg, Oral, Q6H PRN, [facility], CAROLANN, 5 mg at 03/24/23 1018  •  piperacillin-tazobactam (ZOSYN) IVPB 3 375 g, 3 375 g, Intravenous, Q6H, [facility], CAROLANN, Last Rate: 200 mL/hr at 03/24/23 1016, 3 375 g at 03/24/23 1016  •  senna (SENOKOT) tablet 8 6 mg, 1 tablet, Oral, HS PRN, [facility], CAROLANN  •  vancomycin (VANCOCIN) IVPB (premix in dextrose) 1,000 mg 200 mL, 1,000 mg, Intravenous, Q12H, [facility], PA-YANIRA    Allergies   Allergen Reactions   • Levaquin [Levofloxacin] Rash   • Morphine Rash         Intake/Output Summary (Last 24 hours) at 3/24/2023 1111  Last data filed at 3/24/2023 0102  Gross per 24 hour   Intake 1100 ml   Output --   Net 1100 ml       PE:  GEN: NAD  VSS, Tmax: 98 8  HEENT: anicteric  NECK: supple  CARDIAC: rrr s1s2  LUNGS: decreased bs  ABDOMEN: soft nt/nd + BS  EXTREMITIES: L BKA  SKIN: large sacral decub  NEURO: paraplegia     Invasive Devices:   Peripheral IV 03/23/23 Dorsal (posterior); Left Hand (Active)   Site Assessment WDL;Dry;Clean 03/24/23 0140   Dressing Type Transparent 03/24/23 0140   Line Status Blood return noted 03/24/23 0140   Dressing Status Clean;Dry; Intact 03/24/23 0140       Peripheral IV 03/23/23 Dorsal (posterior); Left Forearm (Active)   Site Assessment WDL; Clean;Dry; Intact 03/24/23 0140   Dressing Type Transparent 03/24/23 0140   Line Status Blood return noted 03/24/23 0140   Dressing Status Clean;Dry; Intact 03/24/23 0140       Ileostomy LLQ (Active)   Stomal Appliance 2 piece 03/24/23 0147           Lab Results:   Admission on 03/23/2023   Component Date Value   • WBC 03/23/2023 14 83 (H)    • RBC 03/23/2023 3 85 (L)    • Hemoglobin 03/23/2023 10 7 (L)    • Hematocrit 03/23/2023 32 0 (L)    • MCV 03/23/2023 83    • MCH 03/23/2023 27 8    • MCHC 03/23/2023 33 4    • RDW 03/23/2023 16 8 (H)    • MPV 03/23/2023 7 8 (L)    • Platelets 13/62/7935 941 (H)    • nRBC 03/23/2023 0    • Neutrophils Relative 03/23/2023 82 (H)    • Immat GRANS % 03/23/2023 1    • Lymphocytes Relative 03/23/2023 10 (L)    • Monocytes Relative 03/23/2023 6    • Eosinophils Relative 03/23/2023 1    • Basophils Relative 03/23/2023 0    • Neutrophils Absolute 03/23/2023 12 37 (H)    • Immature Grans Absolute 03/23/2023 0 09    • Lymphocytes Absolute 03/23/2023 1 42    • Monocytes Absolute 03/23/2023 0 87    • Eosinophils Absolute 03/23/2023 0 07    • Basophils Absolute 03/23/2023 0 01    • Sodium 03/23/2023 130 (L)    • Potassium 03/23/2023 3 7    • Chloride 03/23/2023 98    • CO2 03/23/2023 23    • ANION GAP 03/23/2023 9    • BUN 03/23/2023 6    • Creatinine 03/23/2023 0 51 (L)    • Glucose 03/23/2023 120    • Calcium 03/23/2023 8 4    • Corrected Calcium 03/23/2023 9 1    • AST 03/23/2023 8 (L)    • ALT 03/23/2023 5 (L)    • Alkaline Phosphatase 03/23/2023 99    • Total Protein 03/23/2023 7 2    • Albumin 03/23/2023 3 1 (L)    • Total Bilirubin 03/23/2023 0 29    • eGFR 03/23/2023 125    • LACTIC ACID 03/23/2023 1 0    • Procalcitonin 03/23/2023 50 61 (H)    • Protime 03/23/2023 14 3    • INR 03/23/2023 1 04    • PTT 03/23/2023 47 (H)    • Blood Culture 03/23/2023 Received in Microbiology Lab  Culture in Progress  • Blood Culture 03/23/2023 Received in Microbiology Lab  Culture in Progress      • Color, UA 03/23/2023 Yellow    • Clarity, UA 03/23/2023 Cloudy    • Specific Gravity, UA 03/23/2023 1 025    • pH, UA 03/23/2023 7 5    • Leukocytes, UA 03/23/2023 Large (A)    • Nitrite, UA 03/23/2023 Positive (A)    • Protein, UA 03/23/2023 100 (2+) (A)    • Glucose, UA 03/23/2023 Negative    • Ketones, UA 03/23/2023 10 (1+) (A)    • Urobilinogen, UA 03/23/2023 <2 0    • Bilirubin, UA 03/23/2023 Negative    • Occult Blood, UA 03/23/2023 Small (A)    • Sed Rate 03/23/2023 100 (H)    • CRP 03/23/2023 129 3 (H)    • Ventricular Rate 03/23/2023 100    • Atrial Rate 03/23/2023 100    • [place] Interval 03/23/2023 104    • QRSD Interval 03/23/2023 82    • QT Interval 03/23/2023 358    • QTC Interval 03/23/2023 461    • P Axis 03/23/2023 49    • QRS Axis 03/23/2023 82    • T Wave Axis 03/23/2023 60    • RBC, UA 03/23/2023 Field obscured, unable to enumerate (A)    • WBC, UA 03/23/2023 Innumerable (A)    • Epithelial Cells 03/23/2023 Occasional    • Bacteria, UA 03/23/2023 Innumerable (A)    • Sodium 03/24/2023 131 (L)    • Potassium 03/24/2023 3 5    • Chloride 03/24/2023 103    • CO2 03/24/2023 21    • ANION GAP 03/24/2023 7    • BUN 03/24/2023 6    • Creatinine 03/24/2023 0 47 (L)    • Glucose 03/24/2023 95    • Calcium 03/24/2023 7 7 (L)    • eGFR 03/24/2023 129    • WBC 03/24/2023 11 67 (H)    • RBC 03/24/2023 3 30 (L)    • Hemoglobin 03/24/2023 9 1 (L)    • Hematocrit 03/24/2023 28 1 (L)    • MCV 03/24/2023 85    • MCH 03/24/2023 27 6    • MCHC 03/24/2023 32 4    • RDW 03/24/2023 17 2 (H)    • Platelets 21/40/2668 822 (H)    • MPV 03/24/2023 7 9 (L)    • Magnesium 03/24/2023 1 9    • Procalcitonin 03/24/2023 43 37 (H)      Imaging Studies:   EKG, Pathology, and Other Studies:     Culture  Lab Results   Component Value Date    BLOODCX Received in Microbiology Lab  Culture in Progress  03/23/2023    BLOODCX Received in Microbiology Lab  Culture in Progress   03/23/2023     No results found for: WOUNDCULT  No results found for: URINECX  No results found for: SPUTUMCULTUR    Principal Problem:    Cystitis  Active Problems:    Status post colostomy ([facility] [address] )    Personal history of venous thrombosis and embolism    Paraplegia ([facility] [address] )    Hyponatremia    Sepsis without acute organ dysfunction ([facility] [address] )    Sacral wound, subsequent encounter    S/P BKA (below knee amputation), [facility]

## 2023-03-24 NOTE — PROGRESS NOTES
Edinson Herron is a 50 y o  male who is currently ordered Vancomycin IV with management by the Pharmacy Consult service  Relevant clinical data and objective / subjective history reviewed  Vancomycin Assessment:  Indication and Goal AUC/Trough: Soft tissue (goal -600, trough >10)  Clinical Status: stable  Micro:   Pending  Renal Function:  SCr: 0 47 mg/dL  CrCl: 106 9 mL/min  Renal replacement: Not on dialysis  Days of Therapy: 2  Current Dose: 1000mg every 12 hours  Vancomycin Plan:  New Dosing: no change  Estimated AUC: 489 mcg*hr/mL  Estimated Trough: 12 7 mcg/mL  Next Level: 3/25/23 at 0600  Renal Function Monitoring: Daily BMP and [place] will continue to follow closely for s/sx of nephrotoxicity, infusion reactions and appropriateness of therapy  BMP and CBC will be ordered per protocol  We will continue to follow the patient’s culture results and clinical progress daily      Chivo Beasley, Pharmacist, PharmD, BCPS

## 2023-03-24 NOTE — CASE MANAGEMENT
Case Management Assessment & Discharge Planning Note    Patient name Tamiko Solis  Location /-94 MRN [id number]  : 1970 Date 3/24/2023       Current Admission Date: 3/23/2023  Current Admission Diagnosis:Cystitis   Patient Active Problem List    Diagnosis Date Noted   • Sepsis without acute organ dysfunction ([facility] [address] ) 2023   • Cystitis 2023   • Sacral wound, subsequent encounter 2023   • S/P BKA (below knee amputation), left ([facility] [address] ) 2023   • Hyponatremia 2022   • Status post colostomy ([facility] [address] ) 2021   • Personal history of venous thrombosis and embolism 2021   • Paraplegia ([facility] [address] ) 2021      LOS (days): 0  Geometric Mean LOS (GMLOS) (days):   Days to GMLOS:     OBJECTIVE:    Risk of Unplanned Readmission Score: 11 95         Current admission status: Inpatient       Preferred Pharmacy:   [id number] Pauline Baker, [address] [place] [address]  Phone: [phone] Fax: [phone]    Primary Care Provider: No primary care provider on file  Primary Insurance:   Secondary Insurance:     ASSESSMENT:  Active Health Care Proxies    There are no active Health Care Proxies on file  Advance Directives  Does patient have a Health Care POA?: Yes  Does patient have Advance Directives?: Yes  Advance Directives: Living will, Power of  for health care  Primary Contact: patient states his Micki Strong is his POA and has paperwork         Readmission Root Cause  30 Day Readmission: No    Patient Information  Admitted from[de-identified] Other (comment) (Homeless)  Mental Status: Alert  During Assessment patient was accompanied by: Not accompanied during assessment  Assessment information provided by[de-identified] Patient  Primary Caregiver: Self  Support Systems: None  [place] of Residence: Other (specify in comment box)  Home entry access options   Select all that apply : Other access (Comment) (Has been living in his care)  Type of Current Residence: Homeless  In the last 12 months, was there a time when you were not able to pay the mortgage or rent on time?: Yes  In the last 12 months, how many places have you lived?: 2  In the last 12 months, was there a time when you did not have a steady place to sleep or slept in a shelter (including now)?: Yes  Living Arrangements: Other (Comment)  Is patient a ?: No    Activities of Daily Living Prior to Admission  Functional Status: Independent  Ambulates independently?: No  Level of ambulatory dependence: Total Dependent  Does patient use assisted devices?: Yes  Assisted Devices (DME) used:  Wheelchair  Does patient currently own DME?: Yes  What DME does the patient currently own?: Wheelchair  Does patient have a history of Outpatient Therapy (PT/OT)?: Yes  Does the patient have a history of Short-Term Rehab?: Yes [facility], many SNF's since 2003)  Does patient have a history of HHC?: No  Does patient currently have [facility]?: No         Patient Information Continued  Income Source: Unemployed  Does patient have prescription coverage?: Yes  Within the past 12 months, you worried that your food would run out before you got the money to buy more : Never true  Within the past 12 months, the food you bought just didn't last and you didn't have money to get more : Never true  Does patient receive dialysis treatments?: No  Does patient have a history of substance abuse?: No  Does patient have a history of Mental Health Diagnosis?: No         Means of Transportation  Means of Transport to Appts[de-identified] Drives Self  In the past 12 months, has lack of transportation kept you from medical appointments or from getting medications?: No  In the past 12 months, has lack of transportation kept you from meetings, work, or from getting things needed for daily living?: No        DISCHARGE DETAILS:    Discharge planning discussed with[de-identified] patient        CM contacted family/caregiver?: No- see comments (javi alert and in charge of his care )      Met with patient to review the role of CM and discuss any help patient may need prior to discharge  Patient reports being homeless residing in his car for the past 2 weeks after staying with his sister for a period of time  Patient is WC bound after a fall in 2003  He is on [employer], he presented a card with claim number, this information was emailed to [email or web address]  Patient feels he will need SNF for wound care before returning home with sister or other living situation  Patient may need VAC and long term IV abx for osteo  Referrals sent to SNF's within a 15 mile radius  Wait availability, patient preference and insurance coverage

## 2023-03-24 NOTE — NURSING NOTE
Patient admitted with colostomy bag in place unable to visualize stoma through bag and surrounding skin under wafer  Thick medical tape holding wafer in place  Pt refusing to allow staff to remove appliance to assess stoma  Pt educated and still adamantly refusing

## 2023-03-24 NOTE — H&P
[facility]  H&P  Name: Lasha Church  MRN: [id number]  Unit/Bed#: -01 I Date of Admission: 3/23/2023   Date of Service: 3/24/2023 I Hospital Day: 0      Assessment/Plan   * Cystitis  Assessment & Plan  · Utilizes external condom catheter  · UA: Interval WBCs and bacteria  · CT with evidence of severe cystitis  · Currently on vancomycin and Zosyn for infected sacral wounds -de-escalate antibiotics as able  · Place on urinary retention protocol     Sacral wound, subsequent encounter  Assessment & Plan  · 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  · Reports increasing size and drainage over the last 1 week  · VRAD makes no mention of depth of wounds  · Continue IV vancomycin and Zosyn for now  · Consult surgery for possible debridement    Sepsis without acute organ dysfunction ([facility] [address] )  Assessment & Plan  · SIRS criteria met on admission: Tachycardia and leukocytosis  · Suspected sources cystitis, as well as infected sacral wounds  · No severe sepsis criteria met  · Procalcitonin 50, recheck in a m    · Continue vancomycin and Zosyn for now  · Blood cultures x2, pending    Hyponatremia  Assessment & Plan  · Sodium at 130 on admission  · Baseline sodium ranges between 132-137  · Given 1 L normal saline bolus in the ER, recheck BMP this morning  · Hold further IV fluids for now    S/P BKA (below knee amputation), left [facility])  Assessment & Plan  · Performed at [facility] 3/14/20[facility] by Dr Ashwini Castellanos for definitive management of decubitus ulcer  · Presents with ulcer to RLE  · Podiatry consulted for lower extremity wounds    Paraplegia [facility])  Assessment & Plan  · Status post work injury  · Fairly independent with self-reported wheelchair and handicap vehicle  · Unable to care for sacral wounds himself at home, looking for placement until wounds are healed  · CM consulted    Status post colostomy [facility])  Assessment & "Plan  · Patient reports normal output of colostomy -unable to visualize stoma on admission as patient refuses ostomy bag change    Personal history of venous thrombosis and embolism  Assessment & Plan  · Status post IVC filter         VTE Pharmacologic Prophylaxis: VTE Score: 5 High Risk (Score >/= 5) - Pharmacological DVT Prophylaxis Ordered: enoxaparin (Lovenox)  Sequential Compression Devices Ordered  Code Status: Level 1 - Full Code   Discussion with family: Patient updated on admission about care plan  Anticipated Length of Stay: Patient will be admitted on an inpatient basis with an anticipated length of stay of greater than 2 midnights secondary to Cystitis, sacral wound, sepsis, hyponatremia, paraplegia  Total Time Spent on Date of Encounter in care of patient: 75 minutes This time was spent on one or more of the following: performing physical exam; counseling and coordination of care; obtaining or reviewing history; documenting in the medical record; reviewing/ordering tests, medications or procedures; communicating with other healthcare professionals and discussing with patient's family/caregivers  Chief Complaint: \" The wounds have gotten worse over the last 1 week\"    History of Present Illness:  Andrea Mina is a 50 y o  male with a PMH of paraplegia, left BKA, colostomy, and multiple wounds who presents with worsening sacral wounds over the last week  Patient reports wounds have increased in size and drainage  Denies fevers or chills but admits to malaise  Reports changing Allevyn dressings at home  Reports normal ostomy output  No other acute symptoms  Patient states he is looking for nursing facility placement until his wounds heal as he is currently living in his car and unable to perform wound care  Review of Systems:  Review of Systems   Constitutional: Negative for chills and fever  Positive for malaise   HENT: Negative for congestion      Respiratory: Negative for " "cough and shortness of breath  Cardiovascular: Negative for chest pain and leg swelling  Gastrointestinal: Negative for abdominal pain, nausea and vomiting  Normal ostomy output   Genitourinary: Negative for dysuria  Uses external condom catheter   Musculoskeletal: Positive for gait problem (Wheelchair-bound secondary to paraplegia)  Skin: Positive for wound  Neurological: Positive for weakness (Bilateral lower extremities baseline)  All other systems reviewed and are negative  Past Medical and Surgical History:   Past Medical History:   Diagnosis Date   • Osteomyelitis [facility])        Past Surgical History:   Procedure Laterality Date   • BELOW KNEE LEG AMPUTATION Left     March, 2022   • FEMUR FRACTURE SURGERY Right    • HIP ARTHROSCOPY Right    • LEG SURGERY Right     Tib,Fib   • SPINAL CORD DECOMPRESSION         Meds/Allergies:  Prior to Admission medications    Not on File     I have reviewed home medications with patient personally  Allergies: Allergies   Allergen Reactions   • Levaquin [Levofloxacin] Rash   • Morphine Rash       Social History:  Marital Status: /Civil Union   Occupation: Not applicable  Patient Pre-hospital Living Situation: Currently living in his car  Patient Pre-hospital Level of Mobility: manual wheelchair  Patient Pre-hospital Diet Restrictions: None  Substance Use History:   Social History     Substance and Sexual Activity   Alcohol Use Never     Social History     Tobacco Use   Smoking Status Every Day   • Types: Cigarettes   • Passive exposure: Never   Smokeless Tobacco Never     Social History     Substance and Sexual Activity   Drug Use Never       Family History:  History reviewed  No pertinent family history      Physical Exam:     Vitals:   Blood Pressure: 117/69 (03/24/23 0130)  Pulse: 99 (03/24/23 0130)  Temperature: 99 1 °F (37 3 °C) (03/24/23 0130)  Temp Source: Temporal (03/23/23 1755)  Respirations: 16 (03/24/23 0130)  Height: 5' 7\" " (170 2 cm) (03/23/23 1755)  Weight - Scale: 47 6 kg (105 lb) (03/23/23 1755)  SpO2: 98 % (03/24/23 0130)    Physical Exam  Vitals and nursing note reviewed  Constitutional:       General: He is not in acute distress  Appearance: Normal appearance  He is not ill-appearing  Comments: Pleasant and conversational   HENT:      Head: Normocephalic  Nose: Nose normal       Mouth/Throat:      Mouth: Mucous membranes are dry  Eyes:      Extraocular Movements: Extraocular movements intact  Conjunctiva/sclera: Conjunctivae normal    Cardiovascular:      Rate and Rhythm: Normal rate and regular rhythm  Pulses: Normal pulses  Heart sounds: No murmur heard  Pulmonary:      Effort: Pulmonary effort is normal       Breath sounds: Normal breath sounds  Abdominal:      General: Abdomen is flat  Palpations: Abdomen is soft  Comments: Ostomy bag in place  Unable to visualize stoma or site as area is covered with tape, which patient refuses to remove   Musculoskeletal:      Cervical back: Normal range of motion  Comments: Unable to move bilateral lower extremity secondary to paraplegia   Skin:     General: Skin is warm and dry  Comments: Multiple wounds to sacrum, buttocks, right lower extremity, and penis  Media placed in chart  Neurological:      General: No focal deficit present  Mental Status: He is alert and oriented to person, place, and time  Psychiatric:         Mood and Affect: Mood normal          Thought Content:  Thought content normal           Additional Data:     Lab Results:  Results from last 7 days   Lab Units 03/23/23 2118   WBC Thousand/uL 14 83*   HEMOGLOBIN g/dL 10 7*   HEMATOCRIT % 32 0*   PLATELETS Thousands/uL 941*   NEUTROS PCT % 82*   LYMPHS PCT % 10*   MONOS PCT % 6   EOS PCT % 1     Results from last 7 days   Lab Units 03/23/23 2118   SODIUM mmol/L 130*   POTASSIUM mmol/L 3 7   CHLORIDE mmol/L 98   CO2 mmol/L 23   BUN mg/dL 6   CREATININE mg/dL 0 51*   ANION GAP mmol/L 9   CALCIUM mg/dL 8 4   ALBUMIN g/dL 3 1*   TOTAL BILIRUBIN mg/dL 0 29   ALK PHOS U/L 99   ALT U/L 5*   AST U/L 8*   GLUCOSE RANDOM mg/dL 120     Results from last 7 days   Lab Units 03/23/23  2118   INR  1 04             Results from last 7 days   Lab Units 03/23/23 2118   LACTIC ACID mmol/L 1 0   PROCALCITONIN ng/ml 50 61*       Lines/Drains:  Invasive Devices     Peripheral Intravenous Line  Duration           Peripheral IV 03/23/23 Dorsal (posterior); Left Forearm <1 day    Peripheral IV 03/23/23 Dorsal (posterior); Left Hand <1 day          Drain  Duration           Ileostomy LLQ -- days                    Imaging: Reviewed radiology reports from this admission including: CT A/P: IVC filter  Meme Carol from urinary bladder wall thickening measuring 15 mm most consistent with severe infectious cystitis  Left lower quadrant ostomy  XR chest portable    (Results Pending)   CT abdomen pelvis with contrast    (Results Pending)       EKG and Other Studies Reviewed on Admission:   · EKG: Sinus rhythm with short AL     ** Please Note: This note has been constructed using a voice recognition system   **

## 2023-03-24 NOTE — PLAN OF CARE
Problem: MOBILITY - ADULT  Goal: Maintain or return to baseline ADL function  Description: INTERVENTIONS:  -  Assess patient's ability to carry out ADLs; assess patient's baseline for ADL function and identify physical deficits which impact ability to perform ADLs (bathing, care of mouth/teeth, toileting, grooming, dressing, etc )  - Assess/evaluate cause of self-care deficits   - Assess range of motion  - Assess patient's mobility; develop plan if impaired  - Assess patient's need for assistive devices and provide as appropriate  - Encourage maximum independence but intervene and supervise when necessary  - Involve family in performance of ADLs  - Assess for home care needs following discharge   - Consider OT consult to assist with ADL evaluation and planning for discharge  - Provide patient education as appropriate  3/24/2023 0138 by Gordon Son RN  Outcome: Progressing  3/24/2023 0138 by Gordon Son RN  Outcome: Progressing  Goal: Maintains/Returns to pre admission functional level  Description: INTERVENTIONS:  - Perform BMAT or MOVE assessment daily    - Set and communicate daily mobility goal to care team and patient/family/caregiver  - Collaborate with rehabilitation services on mobility goals if consulted  - Perform Range of Motion 2 times a day  - Reposition patient every 2 hours    - Dangle patient 2 times a day  - Stand patient 2 times a day  - Ambulate patient 2 times a day  - Out of bed to chair 2 times a day   - Out of bed for meals 2 times a day  - Out of bed for toileting  - Record patient progress and toleration of activity level   3/24/2023 0138 by Gordon Son RN  Outcome: Progressing  3/24/2023 0138 by Gordon Son RN  Outcome: Progressing     Problem: PAIN - ADULT  Goal: Verbalizes/displays adequate comfort level or baseline comfort level  Description: Interventions:  - Encourage patient to monitor pain and request assistance  - Assess pain using appropriate pain scale  - Administer analgesics based on type and severity of pain and evaluate response  - Implement non-pharmacological measures as appropriate and evaluate response  - Consider cultural and social influences on pain and pain management  - Notify physician/advanced practitioner if interventions unsuccessful or patient reports new pain  Outcome: Progressing     Problem: INFECTION - ADULT  Goal: Absence or prevention of progression during hospitalization  Description: INTERVENTIONS:  - Assess and monitor for signs and symptoms of infection  - Monitor lab/diagnostic results  - Monitor all insertion sites, i e  indwelling lines, tubes, and drains  - Monitor endotracheal if appropriate and nasal secretions for changes in amount and color  - Alliance appropriate cooling/warming therapies per order  - Administer medications as ordered  - Instruct and encourage patient and family to use good hand hygiene technique  - Identify and instruct in appropriate isolation precautions for identified infection/condition  Outcome: Progressing  Goal: Absence of fever/infection during neutropenic period  Description: INTERVENTIONS:  - Monitor WBC    Outcome: Progressing     Problem: SAFETY ADULT  Goal: Maintain or return to baseline ADL function  Description: INTERVENTIONS:  -  Assess patient's ability to carry out ADLs; assess patient's baseline for ADL function and identify physical deficits which impact ability to perform ADLs (bathing, care of mouth/teeth, toileting, grooming, dressing, etc )  - Assess/evaluate cause of self-care deficits   - Assess range of motion  - Assess patient's mobility; develop plan if impaired  - Assess patient's need for assistive devices and provide as appropriate  - Encourage maximum independence but intervene and supervise when necessary  - Involve family in performance of ADLs  - Assess for home care needs following discharge   - Consider OT consult to assist with ADL evaluation and planning for discharge  - Provide patient education as appropriate  3/24/2023 0138 by Chip Chaney RN  Outcome: Progressing  3/24/2023 0138 by Chip Chaney RN  Outcome: Progressing  Goal: Maintains/Returns to pre admission functional level  Description: INTERVENTIONS:  - Perform BMAT or MOVE assessment daily    - Set and communicate daily mobility goal to care team and patient/family/caregiver  - Collaborate with rehabilitation services on mobility goals if consulted  - Perform Range of Motion 2 times a day  - Reposition patient every 22 hours    - Dangle patient 2 times a day  - Stand patient 2 times a day  - Ambulate patient 2 times a day  - Out of bed to chair 2 times a day   - Out of bed for meals 2 times a day  - Out of bed for toileting  - Record patient progress and toleration of activity level   3/24/2023 0138 by Chip Chaney RN  Outcome: Progressing  3/24/2023 0138 by Chip Chaney RN  Outcome: Progressing  Goal: Patient will remain free of falls  Description: INTERVENTIONS:  - Educate patient/family on patient safety including physical limitations  - Instruct patient to call for assistance with activity   - Consult OT/PT to assist with strengthening/mobility   - Keep Call bell within reach  - Keep bed low and locked with side rails adjusted as appropriate  - Keep care items and personal belongings within reach  - Initiate and maintain comfort rounds  - Make Fall Risk Sign visible to staff  - Offer Toileting every 2 Hours, in advance of need  - Initiate/Maintain 2alarm  - Obtain necessary fall risk management equipment: 2  - Apply yellow socks and bracelet for high fall risk patients  - Consider moving patient to room near nurses station  Outcome: Progressing     Problem: DISCHARGE PLANNING  Goal: Discharge to home or other facility with appropriate resources  Description: INTERVENTIONS:  - Identify barriers to discharge w/patient and caregiver  - Arrange for needed discharge resources and transportation as appropriate  - Identify discharge learning needs (meds, wound care, etc )  - Arrange for interpretive services to assist at discharge as needed  - Refer to Case Management Department for coordinating discharge planning if the patient needs post-hospital services based on physician/advanced practitioner order or complex needs related to functional status, cognitive ability, or social support system  Outcome: Progressing     Problem: Knowledge Deficit  Goal: Patient/family/caregiver demonstrates understanding of disease process, treatment plan, medications, and discharge instructions  Description: Complete learning assessment and assess knowledge base    Interventions:  - Provide teaching at level of understanding  - Provide teaching via preferred learning methods  Outcome: Progressing

## 2023-03-24 NOTE — NURSING NOTE
Pt inquired about setting up condom cath for himself and was requesting drainage bag to connect his personal condom caths to  Nurse provided thorough education regarding non-healing of existing wounds on penis if a condom cath were to be applied  Pt seemed very upset as he as urge incontinence  Nurse explained that the risks outweigh the benefits in this situation  Pt currently briefed in bed  Brief is clean, dry, and intact  Provider made aware  No new orders at this time  Care continuing

## 2023-03-25 PROBLEM — N12 PYELONEPHRITIS: Status: ACTIVE | Noted: 2023-03-24

## 2023-03-25 PROBLEM — E43 SEVERE PROTEIN-CALORIE MALNUTRITION (HCC): Status: ACTIVE | Noted: 2023-03-25

## 2023-03-25 LAB
ANION GAP SERPL CALCULATED.3IONS-SCNC: 3 MMOL/L (ref 4–13)
BASOPHILS # BLD AUTO: 0.02 THOUSANDS/ÂΜL (ref 0–0.1)
BASOPHILS NFR BLD AUTO: 0 % (ref 0–1)
BUN SERPL-MCNC: 5 MG/DL (ref 5–25)
CALCIUM SERPL-MCNC: 7.6 MG/DL (ref 8.4–10.2)
CHLORIDE SERPL-SCNC: 107 MMOL/L (ref 96–108)
CO2 SERPL-SCNC: 24 MMOL/L (ref 21–32)
CREAT SERPL-MCNC: 0.39 MG/DL (ref 0.6–1.3)
EOSINOPHIL # BLD AUTO: 0.32 THOUSAND/ÂΜL (ref 0–0.61)
EOSINOPHIL NFR BLD AUTO: 5 % (ref 0–6)
ERYTHROCYTE [DISTWIDTH] IN BLOOD BY AUTOMATED COUNT: 17.1 % (ref 11.6–15.1)
GFR SERPL CREATININE-BSD FRML MDRD: 139 ML/MIN/1.73SQ M
GLUCOSE SERPL-MCNC: 94 MG/DL (ref 65–140)
HCT VFR BLD AUTO: 27.2 % (ref 36.5–49.3)
HGB BLD-MCNC: 8.8 G/DL (ref 12–17)
IMM GRANULOCYTES # BLD AUTO: 0.04 THOUSAND/UL (ref 0–0.2)
IMM GRANULOCYTES NFR BLD AUTO: 1 % (ref 0–2)
LYMPHOCYTES # BLD AUTO: 1.15 THOUSANDS/ÂΜL (ref 0.6–4.47)
LYMPHOCYTES NFR BLD AUTO: 17 % (ref 14–44)
MCH RBC QN AUTO: 27.8 PG (ref 26.8–34.3)
MCHC RBC AUTO-ENTMCNC: 32.4 G/DL (ref 31.4–37.4)
MCV RBC AUTO: 86 FL (ref 82–98)
MONOCYTES # BLD AUTO: 0.59 THOUSAND/ÂΜL (ref 0.17–1.22)
MONOCYTES NFR BLD AUTO: 9 % (ref 4–12)
MRSA NOSE QL CULT: ABNORMAL
NEUTROPHILS # BLD AUTO: 4.83 THOUSANDS/ÂΜL (ref 1.85–7.62)
NEUTS SEG NFR BLD AUTO: 68 % (ref 43–75)
NRBC BLD AUTO-RTO: 0 /100 WBCS
PLATELET # BLD AUTO: 727 THOUSANDS/UL (ref 149–390)
PMV BLD AUTO: 8 FL (ref 8.9–12.7)
POTASSIUM SERPL-SCNC: 3.7 MMOL/L (ref 3.5–5.3)
PROCALCITONIN SERPL-MCNC: 17.87 NG/ML
RBC # BLD AUTO: 3.17 MILLION/UL (ref 3.88–5.62)
SODIUM SERPL-SCNC: 134 MMOL/L (ref 135–147)
VANCOMYCIN SERPL-MCNC: 24.2 UG/ML (ref 10–20)
WBC # BLD AUTO: 6.95 THOUSAND/UL (ref 4.31–10.16)

## 2023-03-25 RX ADMIN — HYDROMORPHONE HYDROCHLORIDE 0.5 MG: 1 INJECTION, SOLUTION INTRAMUSCULAR; INTRAVENOUS; SUBCUTANEOUS at 04:05

## 2023-03-25 RX ADMIN — HYDROMORPHONE HYDROCHLORIDE 0.5 MG: 1 INJECTION, SOLUTION INTRAMUSCULAR; INTRAVENOUS; SUBCUTANEOUS at 12:32

## 2023-03-25 RX ADMIN — COLLAGENASE SANTYL: 250 OINTMENT TOPICAL at 08:11

## 2023-03-25 RX ADMIN — OXYCODONE HYDROCHLORIDE 5 MG: 5 TABLET ORAL at 00:40

## 2023-03-25 RX ADMIN — CEFEPIME HYDROCHLORIDE 2000 MG: 2 INJECTION, SOLUTION INTRAVENOUS at 03:39

## 2023-03-25 RX ADMIN — ENOXAPARIN SODIUM 30 MG: 100 INJECTION SUBCUTANEOUS at 08:10

## 2023-03-25 RX ADMIN — VANCOMYCIN HYDROCHLORIDE 1000 MG: 1 INJECTION, SOLUTION INTRAVENOUS at 00:35

## 2023-03-25 RX ADMIN — CEFEPIME HYDROCHLORIDE 2000 MG: 2 INJECTION, SOLUTION INTRAVENOUS at 15:10

## 2023-03-25 RX ADMIN — OXYCODONE HYDROCHLORIDE 5 MG: 5 TABLET ORAL at 08:10

## 2023-03-25 RX ADMIN — OXYCODONE HYDROCHLORIDE 5 MG: 5 TABLET ORAL at 15:13

## 2023-03-25 RX ADMIN — HYDROMORPHONE HYDROCHLORIDE 0.5 MG: 1 INJECTION, SOLUTION INTRAMUSCULAR; INTRAVENOUS; SUBCUTANEOUS at 17:23

## 2023-03-25 RX ADMIN — HYDROMORPHONE HYDROCHLORIDE 0.5 MG: 1 INJECTION, SOLUTION INTRAMUSCULAR; INTRAVENOUS; SUBCUTANEOUS at 20:34

## 2023-03-25 RX ADMIN — VANCOMYCIN HYDROCHLORIDE 1000 MG: 1 INJECTION, SOLUTION INTRAVENOUS at 12:21

## 2023-03-25 NOTE — ASSESSMENT & PLAN NOTE
· Utilizes external condom catheter - avoiding use in hospital secondary to wound on bottom of penis  · UC - gm negative rods  · BC - no growth to date  · CT with evidence of severe cystitis  · Currently on cefepime per ID  · Place on urinary retention protocol

## 2023-03-25 NOTE — UTILIZATION REVIEW
"Initial Clinical Review    Admission: Date/Time/Statement:   Admission Orders (From admission, onward)     Ordered        03/24/23 0029  INPATIENT ADMISSION  Once                      Orders Placed This Encounter   Procedures   • INPATIENT ADMISSION     Standing Status:   Standing     Number of Occurrences:   1     Order Specific Question:   Level of Care     Answer:   Med Surg [16]     Order Specific Question:   Estimated length of stay     Answer:   More than 2 Midnights     Order Specific Question:   Certification     Answer:   I certify that inpatient services are medically necessary for this patient for a duration of greater than two midnights  See H&P and MD Progress Notes for additional information about the patient's course of treatment  ED Arrival Information     Expected   -    Arrival   3/23/2023 17:43    Acuity   Urgent            Means of arrival   Walk-In    Escorted by   Self    Service   Hospitalist    Admission type   Emergency            Arrival complaint   Wounds check           Chief Complaint   Patient presents with   • Wound Check     \"I have 3 wounds on my sacrum and they are getting worse  I need to go to a skilled nursing facility to have them taken care of \" Patient reports he is currently staying in his car  Patient reports having the wounds for over one year  No fever at home       Initial Presentation: 50 y o  male to ED via walk in from home  Present with a PMHX of paraplegia, left BKA, colostomy, and multiple wounds (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)who presents with worsening sacral wounds over the last week  Patient reports wounds have increased in size and drainage  Endorses malaise   Admitted to M/S with DX: Pyelonephritis  on exam: Weak/fatigued and frail/cachectic Ostomy bag in place  Tachy -112; Temp 99 1; generalizes pain 4-10/10; Multiple wounds to sacrum, buttocks, right lower extremity, and penis   WBC " 14 83; Plts 941; Neuro % 82; Na 130; Cr 0 51; procal 50 61  CT = urinary tract infection with possible pyelonephritis ;various areas of possible sacral decubitus osteomyelitis   Given in ED dilaudid iv x1; IVF Bolus 1L; zosyn iv; vanco iv    PLAN: Cont iv abx; pain control; f/u BCx and urine cx; I/O; daily wt; wound care; general surgery consult; Podiatry consult; CM consulted           ED Triage Vitals [03/23/23 1755]   Temperature Pulse Respirations Blood Pressure SpO2   98 1 °F (36 7 °C) (!) 112 20 125/70 98 %      Temp Source Heart Rate Source Patient Position - Orthostatic VS BP Location FiO2 (%)   Temporal Other (Comment) Sitting Left arm --      Pain Score       4          Wt Readings from Last 1 Encounters:   03/25/23 35 7 kg (78 lb 11 3 oz)     Additional Vital Signs:   Date/Time Temp Pulse Resp BP MAP (mmHg) SpO2 O2 Device Patient Position - Orthostatic VS   03/25/23 08:19:45 98 2 °F (36 8 °C) 76 -- 106/54 71 97 % -- --   03/25/23 01:50:30 98 1 °F (36 7 °C) 75 -- 104/53 70 99 % -- --   03/25/23 0005 -- -- -- -- -- -- None (Room air) --   03/24/23 07:00:10 98 8 °F (37 1 °C) 78 14 91/55 67 97 % -- --   03/24/23 01:30:49 99 1 °F (37 3 °C) 99 16 117/69 85 98 % -- --   03/24/23 0000 -- 97 18 133/66 90 100 % None (Room air) Lying   03/23/23 2300 -- 93 18 108/57 75 98 % -- --   03/23/23 2209 -- 102 14 119/70 89 97 % None (Room air) Lying   03/23/23 1755 98 1 °F (36 7 °C) 112 Abnormal  20 125/70 -- 98 % None (Room air) Sitting         EKG: Sinus rhythm with short ID  Otherwise normal ECG  No previous ECGs available      Pertinent Labs/Diagnostic Test Results:   CT abdomen pelvis with contrast   Final Result by Dondra Blizzard, DO (03/24 5419)      Multiple chronic appearing sacral decubitus ulcers as described above with findings of probable osteomyelitis    An area of subcutaneous edema and hyperenhancement of the right perineal soft tissues approximates the posterior margin of the scrotum without    appreciable discrete rim enhancement to suggest abscess currently  Diffuse circumferential urinary bladder wall thickening and enhancement likely represents cystitis  Wall thickening and enhancement of the left renal pelvis and visualized portions of the ureter concerning for ascending infection/ureteritis  Inguinal and retroperitoneal lymphadenopathy suspected to be reactive, described on prior outside CT  Left lower quadrant ostomy and parastomal hernia containing loops of large and small bowel  Left ileal fracture, described on prior outside CT  Additional findings as above  There is a significant additional finding or different interpretation from the [facility] preliminary report which was provided shortly after completion of the exam  Sacral decubitus ulcers and probable osteomyelitis, concern for left ureteritis,    left ileal fracture  This study demonstrates a immediate finding and was documented as such in [facility] for liaison and referring practitioner notification  Workstation performed: [id number]         XR chest portable   Final Result by Raphael Harrington MD (03/24 1242)      No acute cardiopulmonary disease                    Workstation performed: [id number]               Results from last 7 days   Lab Units 03/25/23  0518 03/24/23  0323 03/23/23  2118   WBC Thousand/uL 6 95 11 67* 14 83*   HEMOGLOBIN g/dL 8 8* 9 1* 10 7*   HEMATOCRIT % 27 2* 28 1* 32 0*   PLATELETS Thousands/uL 727* 822* 941*   NEUTROS ABS Thousands/µL 4 83  --  12 37*         Results from last 7 days   Lab Units 03/25/23  0518 03/24/23  0323 03/23/23  2118   SODIUM mmol/L 134* 131* 130*   POTASSIUM mmol/L 3 7 3 5 3 7   CHLORIDE mmol/L 107 103 98   CO2 mmol/L 24 21 23   ANION GAP mmol/L 3* 7 9   BUN mg/dL 5 6 6   CREATININE mg/dL 0 39* 0 47* 0 51*   EGFR ml/min/1 73sq m 139 129 125   CALCIUM mg/dL 7 6* 7 7* 8 4   MAGNESIUM mg/dL  --  1 9  --      Results from last 7 days   Lab Units 03/23/23 2118   AST U/L 8*   ALT U/L 5*   ALK PHOS U/L 99   TOTAL PROTEIN g/dL 7 2   ALBUMIN g/dL 3 1*   TOTAL BILIRUBIN mg/dL 0 29         Results from last 7 days   Lab Units 03/25/23  0518 03/24/23 0323 03/23/23 2118   GLUCOSE RANDOM mg/dL 94 95 120       Results from last 7 days   Lab Units 03/23/23 2118   PROTIME seconds 14 3   INR  1 04   PTT seconds 47*         Results from last 7 days   Lab Units 03/25/23  0518 03/24/23  0323 03/23/23 2118   PROCALCITONIN ng/ml 17 87* 43 37* 50 61*     Results from last 7 days   Lab Units 03/23/23 2118   LACTIC ACID mmol/L 1 0       Results from last 7 days   Lab Units 03/23/23 2118   CRP mg/L 129 3*   SED RATE mm/hour 100*       Results from last 7 days   Lab Units 03/23/23 2218   CLARITY UA  Cloudy   COLOR UA  Yellow   SPEC GRAV UA  1 025   PH UA  7 5   GLUCOSE UA mg/dl Negative   KETONES UA mg/dl 10 (1+)*   BLOOD UA  Small*   PROTEIN UA mg/dl 100 (2+)*   NITRITE UA  Positive*   BILIRUBIN UA  Negative   UROBILINOGEN UA (BE) mg/dl <2 0   LEUKOCYTES UA  Large*   WBC UA /hpf Innumerable*   RBC UA /hpf Field obscured, unable to enumerate*   BACTERIA UA /hpf Innumerable*   EPITHELIAL CELLS WET PREP /hpf Occasional       Results from last 7 days   Lab Units 03/23/23 2218 03/23/23 2217 03/23/23 2120   BLOOD CULTURE   --  No Growth at 24 hrs  No Growth at 24 hrs     GRAM STAIN RESULT   --  No polys seen*  1+ Gram positive cocci in pairs*  --    URINE CULTURE  >100,000 cfu/ml Gram Negative Giles*  --   --      ED Treatment:   Medication Administration from 03/23/2023 1742 to 03/24/2023 0127       Date/Time Order Dose Route Action     03/23/2023 2136 EDT HYDROmorphone (DILAUDID) injection 0 5 mg 0 5 mg Intravenous Given     03/23/2023 2219 EDT sodium chloride 0 9 % bolus 1,000 mL 1,000 mL Intravenous New Bag     03/23/2023 2219 EDT piperacillin-tazobactam (ZOSYN) IVPB 3 375 g 3 375 g Intravenous New Bag     03/23/2023 4364 EDT vancomycin (VANCOCIN) 1250 mg in sodium chloride 0 9% 250 mL IVPB 1,250 mg Intravenous New Bag     03/23/2023 2251 EDT iohexol (OMNIPAQUE) 350 MG/ML injection (SINGLE-DOSE) 100 mL 100 mL Intravenous Given          Present on Admission:  • Hyponatremia  • Paraplegia (HCC)  • Pyelonephritis      Admitting Diagnosis: Cystitis [N30 90]  Wound infection [T14  8XXA, L08 9]  Sacral pressure ulcer [L89 159]  Sepsis ([address] ) [A41 9]     Age/Sex: 50 y o  male     Admission Orders: SCD's; I/O; daily wts; wound care; regular diet    Scheduled Medications:  cefepime, 2,000 mg, Intravenous, Q12H  collagenase, , Topical, Daily  enoxaparin, 30 mg, Subcutaneous, Daily  vancomycin, 1,000 mg, Intravenous, Q12H      Continuous IV Infusions: none     PRN Meds:  acetaminophen, 650 mg, Oral, Q6H PRN  aluminum-magnesium hydroxide-simethicone, 30 mL, Oral, Q6H PRN  HYDROmorphone, 0 5 mg, Intravenous, Q3H PRN (3/25 rec'd x 2)   nicotine, 1 patch, Transdermal, Daily PRN  ondansetron, 4 mg, Intravenous, Q4H PRN  oxyCODONE, 2 5 mg, Oral, Q4H PRN   Or  oxyCODONE, 5 mg, Oral, Q4H PRN (3/24 rec'd x 3; 3/25 rec'd x 2)   senna, 1 tablet, Oral, HS PRN        IP CONSULT TO ACUTE CARE SURGERY  IP CONSULT TO PODIATRY  IP CONSULT TO CASE MANAGEMENT  IP CONSULT TO PHARMACY  IP CONSULT TO INFECTIOUS DISEASES  IP CONSULT TO NUTRITION SERVICES    Network Utilization Review Department  ATTENTION: Please call with any questions or concerns to [phone] and carefully listen to the prompts so that you are directed to the right person  All voicemails are confidential   Tamiko Dove all requests for admission clinical reviews, approved or denied determinations and any other requests to dedicated fax number below belonging to the campus where the patient is receiving treatment   List of dedicated fax numbers for the Facilities:  [address] DENIALS (Administrative/Medical Necessity) [phone]   [address] (Maternity/NICU/Pediatrics) [phone] 2600 [facility] Genetta Blow [phone]   [address] [phone]   [address] [place] [id number] [place] [address] [phone] [address] [facility] 134 815 [facility] [phone]

## 2023-03-25 NOTE — ASSESSMENT & PLAN NOTE
· SIRS criteria met on admission: Tachycardia and leukocytosis  · Suspected source pyelonephritis  · No severe sepsis criteria met  · Continue cefepime and vancomycin - ID following  · Blood cultures x2 negative to date

## 2023-03-25 NOTE — ASSESSMENT & PLAN NOTE
· 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  · Reports increasing size and drainage over the last 1 week  · Surgery consult appreciated  · Continue local wound care  · No need for debridement at this point

## 2023-03-25 NOTE — ED PROVIDER NOTES
"History  Chief Complaint   Patient presents with   • Wound Check     \"I have 3 wounds on my sacrum and they are getting worse  I need to go to a skilled nursing facility to have them taken care of \" Patient reports he is currently staying in his car  Patient reports having the wounds for over one year  No fever at home     22-year-old male presenting with acutely worsening chronic sacral wounds  Patient was here 2 weeks ago for dressing changes but states they have gotten significantly worse since that time  Denies fever at this time  Does report a prior history of osteomyelitis              None       Past Medical History:   Diagnosis Date   • Osteomyelitis [facility])        Past Surgical History:   Procedure Laterality Date   • BELOW KNEE LEG AMPUTATION Left     March, 2022   • FEMUR FRACTURE SURGERY Right    • HIP ARTHROSCOPY Right    • LEG SURGERY Right     Tib,Fib   • SPINAL CORD DECOMPRESSION         History reviewed  No pertinent family history  I have reviewed and agree with the history as documented  E-Cigarette/Vaping   • E-Cigarette Use Never User      E-Cigarette/Vaping Substances     Social History     Tobacco Use   • Smoking status: Every Day     Types: Cigarettes     Passive exposure: Never   • Smokeless tobacco: Never   Vaping Use   • Vaping Use: Never used   Substance Use Topics   • Alcohol use: Never   • Drug use: Never       Review of Systems   Skin: Positive for wound  Physical Exam  Physical Exam  Vitals and nursing note reviewed  Constitutional:       General: He is not in acute distress  Appearance: He is well-developed  HENT:      Head: Normocephalic and atraumatic  Right Ear: External ear normal       Left Ear: External ear normal       Nose: Nose normal    Eyes:      General: No scleral icterus  Pulmonary:      Effort: Pulmonary effort is normal  No respiratory distress  Abdominal:      General: There is no distension  Palpations: Abdomen is soft   " Musculoskeletal:         General: No deformity  Normal range of motion  Cervical back: Normal range of motion and neck supple  Skin:     General: Skin is warm  Findings: No rash  Comments: 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  Neurological:      General: No focal deficit present  Mental Status: He is alert        Gait: Gait normal    Psychiatric:         Mood and Affect: Mood normal          Vital Signs  ED Triage Vitals [03/23/23 1755]   Temperature Pulse Respirations Blood Pressure SpO2   98 1 °F (36 7 °C) (!) 112 20 125/70 98 %      Temp Source Heart Rate Source Patient Position - Orthostatic VS BP Location FiO2 (%)   Temporal Other (Comment) Sitting Left arm --      Pain Score       4           Vitals:    03/24/23 0130 03/24/23 0700 03/25/23 0150 03/25/23 0819   BP: 117/69 91/55 104/53 106/54   Pulse: 99 78 75 76   Patient Position - Orthostatic VS:             Visual Acuity      ED Medications  Medications   acetaminophen (TYLENOL) tablet 650 mg (has no administration in time range)   senna (SENOKOT) tablet 8 6 mg (has no administration in time range)   aluminum-magnesium hydroxide-simethicone (MYLANTA) oral suspension 30 mL (has no administration in time range)   nicotine (NICODERM CQ) 7 mg/24hr TD 24 hr patch 1 patch (has no administration in time range)   enoxaparin (LOVENOX) subcutaneous injection 30 mg (30 mg Subcutaneous Given 3/25/23 0810)   vancomycin (VANCOCIN) IVPB (premix in dextrose) 1,000 mg 200 mL (1,000 mg Intravenous New Bag 3/25/23 1221)   ondansetron (ZOFRAN) injection 4 mg (has no administration in time range)   cefepime (MAXIPIME) IVPB (premix in dextrose) 2,000 mg 50 mL (2,000 mg Intravenous New Bag 3/25/23 0339)   collagenase (SANTYL) ointment ( Topical Given 3/25/23 0811)   HYDROmorphone (DILAUDID) injection 0 5 mg (0 5 mg Intravenous Given 3/25/23 1232)   oxyCODONE (ROXICODONE) split tablet 2 5 mg ( Oral See Alternative 3/25/23 0810) Or   oxyCODONE (ROXICODONE) IR tablet 5 mg (5 mg Oral Given 3/25/23 0810)   HYDROmorphone (DILAUDID) injection 0 5 mg (0 5 mg Intravenous Given 3/23/23 2136)   sodium chloride 0 9 % bolus 1,000 mL (0 mL Intravenous Stopped 3/24/23 0102)   piperacillin-tazobactam (ZOSYN) IVPB 3 375 g (0 g Intravenous Stopped 3/23/23 2259)   vancomycin (VANCOCIN) 1250 mg in sodium chloride 0 9% 250 mL IVPB (1,250 mg Intravenous New Bag 3/23/23 2259)   iohexol (OMNIPAQUE) 350 MG/ML injection (SINGLE-DOSE) 100 mL (100 mL Intravenous Given 3/23/23 2251)       Diagnostic Studies  Results Reviewed     Procedure Component Value Units Date/Time    Urine culture [[id number]]  (Abnormal) Collected: 03/23/23 2218    Lab Status: Preliminary result Specimen: Urine, Clean Catch Updated: 03/25/23 0936     Urine Culture >100,000 cfu/ml Gram Negative [place]    Blood culture #2 [[id number]] Collected: 03/23/23 2217    Lab Status: Preliminary result Specimen: Blood from Arm, Left Updated: 03/25/23 0701     Blood Culture No Growth at 24 hrs  Blood culture #1 [[id number]] Collected: 03/23/23 2120    Lab Status: Preliminary result Specimen: Blood from Hand, Left Updated: 03/25/23 0701     Blood Culture No Growth at 24 hrs      Wound culture and Gram stain [[id number]]  (Abnormal) Collected: 03/23/23 2217    Lab Status: Preliminary result Specimen: Wound from Buttock Updated: 03/24/23 1513     Gram Stain Result No polys seen      1+ Gram positive cocci in pairs    Urine Microscopic [[id number]]  (Abnormal) Collected: 03/23/23 2218    Lab Status: Final result Specimen: Urine, Clean Catch Updated: 03/23/23 2251     RBC, UA       Field obscured, unable to enumerate     /hpf     WBC, UA Innumerable /hpf      Epithelial Cells Occasional /hpf      Bacteria, UA Innumerable /hpf     UA w Reflex to Microscopic w Reflex to Culture [[id number]]  (Abnormal) Collected: 03/23/23 2218    Lab Status: Final result Specimen: Urine, Clean Catch Updated: 03/23/23 2251     Color, UA Yellow     Clarity, UA Cloudy     Specific Gravity, UA 1 025     pH, UA 7 5     Leukocytes, UA Large     Nitrite, UA Positive     Protein,  (2+) mg/dl      Glucose, UA Negative mg/dl      Ketones, UA 10 (1+) mg/dl      Urobilinogen, UA <2 0 mg/dl      Bilirubin, UA Negative     Occult Blood, UA Small    Comprehensive metabolic panel [[id number]]  (Abnormal) Collected: 03/23/23 2118    Lab Status: Final result Specimen: Blood from Hand, Left Updated: 03/23/23 2219     Sodium 130 mmol/L      Potassium 3 7 mmol/L      Chloride 98 mmol/L      CO2 23 mmol/L      ANION GAP 9 mmol/L      BUN 6 mg/dL      Creatinine 0 51 mg/dL      Glucose 120 mg/dL      Calcium 8 4 mg/dL      Corrected Calcium 9 1 mg/dL      AST 8 U/L      ALT 5 U/L      Alkaline Phosphatase 99 U/L      Total Protein 7 2 g/dL      Albumin 3 1 g/dL      Total Bilirubin 0 29 mg/dL      eGFR 125 ml/min/1 73sq m     Narrative:      [place] guidelines for Chronic Kidney Disease (CKD):   •  Stage 1 with normal or high GFR (GFR > 90 mL/min/1 73 square meters)  •  Stage 2 Mild CKD (GFR = 60-89 mL/min/1 73 square meters)  •  Stage 3A Moderate CKD (GFR = 45-59 mL/min/1 73 square meters)  •  Stage 3B Moderate CKD (GFR = 30-44 mL/min/1 73 square meters)  •  Stage 4 Severe CKD (GFR = 15-29 mL/min/1 73 square meters)  •  Stage 5 End Stage CKD (GFR <15 mL/min/1 73 square meters)  Note: GFR calculation is accurate only with a steady state creatinine    C-reactive protein [[id number]]  (Abnormal) Collected: 03/23/23 2118    Lab Status: Final result Specimen: Blood from Hand, Left Updated: 03/23/23 2219      3 mg/L     Procalcitonin [[id number]]  (Abnormal) Collected: 03/23/23 2118    Lab Status: Final result Specimen: Blood from Hand, Left Updated: 03/23/23 2200     Procalcitonin 50 61 ng/ml     Protime-INR [[id number]]  (Normal) Collected: 03/23/23 2118    Lab Status: Final result Specimen: Blood from Hand, Left Updated: 03/23/23 2154 Protime 14 3 seconds      INR 1 04    APTT [[id number]]  (Abnormal) Collected: 03/23/23 2118    Lab Status: Final result Specimen: Blood from Hand, Left Updated: 03/23/23 2153     PTT 47 seconds     Lactic acid [[id number]]  (Normal) Collected: 03/23/23 2118    Lab Status: Final result Specimen: Blood from Hand, Left Updated: 03/23/23 2153     LACTIC ACID 1 0 mmol/L     Narrative:      Result may be elevated if tourniquet was used during collection  Sedimentation rate, automated [[id number]]  (Abnormal) Collected: 03/23/23 2118    Lab Status: Final result Specimen: Blood from Hand, Left Updated: 03/23/23 2146     Sed Rate 100 mm/hour     CBC and differential [[id number]]  (Abnormal) Collected: 03/23/23 2118    Lab Status: Final result Specimen: Blood from Hand, Left Updated: 03/23/23 2133     WBC 14 83 Thousand/uL      RBC 3 85 Million/uL      Hemoglobin 10 7 g/dL      Hematocrit 32 0 %      MCV 83 fL      MCH 27 8 pg      MCHC 33 4 g/dL      RDW 16 8 %      MPV 7 8 fL      Platelets 986 Thousands/uL      nRBC 0 /100 WBCs      Neutrophils Relative 82 %      Immat GRANS % 1 %      Lymphocytes Relative 10 %      Monocytes Relative 6 %      Eosinophils Relative 1 %      Basophils Relative 0 %      Neutrophils Absolute 12 37 Thousands/µL      Immature Grans Absolute 0 09 Thousand/uL      Lymphocytes Absolute 1 42 Thousands/µL      Monocytes Absolute 0 87 Thousand/µL      Eosinophils Absolute 0 07 Thousand/µL      Basophils Absolute 0 01 Thousands/µL                  CT abdomen pelvis with contrast   Final Result by Sravan Zacarias DO (03/24 4451)      Multiple chronic appearing sacral decubitus ulcers as described above with findings of probable osteomyelitis  An area of subcutaneous edema and hyperenhancement of the right perineal soft tissues approximates the posterior margin of the scrotum without    appreciable discrete rim enhancement to suggest abscess currently        Diffuse circumferential urinary bladder wall thickening and enhancement likely represents cystitis  Wall thickening and enhancement of the left renal pelvis and visualized portions of the ureter concerning for ascending infection/ureteritis  Inguinal and retroperitoneal lymphadenopathy suspected to be reactive, described on prior outside CT  Left lower quadrant ostomy and parastomal hernia containing loops of large and small bowel  Left ileal fracture, described on prior outside CT  Additional findings as above  There is a significant additional finding or different interpretation from the [facility] preliminary report which was provided shortly after completion of the exam  Sacral decubitus ulcers and probable osteomyelitis, concern for left ureteritis,    left ileal fracture  This study demonstrates a immediate finding and was documented as such in [facility] for liaison and referring practitioner notification  Workstation performed: [id number]         XR chest portable   Final Result by Kimber Ruffin MD (03/24 1242)      No acute cardiopulmonary disease  Workstation performed: [id number]                    Procedures  CriticalCare Time    Date/Time: 3/25/2023 3:07 PM  Performed by: Kiana Robles DO  Authorized by:  Kiana Robles DO     Critical care provider statement:     Critical care time (minutes):  32    Critical care time was exclusive of:  Separately billable procedures and treating other patients and teaching time    Critical care was necessary to treat or prevent imminent or life-threatening deterioration of the following conditions:  Sepsis and dehydration    Critical care was time spent personally by me on the following activities:  Blood draw for specimens, development of treatment plan with patient or surrogate, obtaining history from patient or surrogate, discussions with primary provider, evaluation of patient's response to treatment, ordering and performing treatments and interventions, ordering and review of laboratory studies, ordering and review of radiographic studies, re-evaluation of patient's condition, review of old charts and examination of patient    I assumed direction of critical care for this patient from another provider in my specialty: no               ED Course                               SBIRT 22yo+    Flowsheet Row Most Recent Value   SBIRT (23 yo +)    In order to provide better care to our patients, we are screening all of our patients for alcohol and drug use  Would it be okay to ask you these screening questions? Yes Filed at: 03/23/2023 2139   Initial Alcohol Screen: US AUDIT-C     1  How often do you have a drink containing alcohol? 0 Filed at: 03/23/2023 2139   2  How many drinks containing alcohol do you have on a typical day you are drinking? 0 Filed at: 03/23/2023 2139   3a  Male UNDER 65: How often do you have five or more drinks on one occasion? 0 Filed at: 03/23/2023 2139   Audit-C Score 0 Filed at: 03/23/2023 2139   [place]: How many times in the past year have you    Used an illegal drug or used a prescription medication for non-medical reasons? Never Filed at: 03/23/2023 2139                    Medical Decision Making  50 yom with acute on chronic worsening wounds  Patient noted to be tachycardic  Given appearance of wounds, I suspect acute infection including cellulitis/possible abscess in addition to a likely UTI from chronic catheter  Sepsis evaluation with labs, inflammatory markers and CTAP to assess for depth of wounds  Patient signed out with CTAP/labs pending  Patient will require admission  Sacral pressure ulcer: acute illness or injury  Sepsis [facility]): acute illness or injury  Amount and/or Complexity of Data Reviewed  Labs: ordered  Risk  Prescription drug management  Decision regarding hospitalization            Disposition  Final diagnoses:   Cystitis   Wound infection   Sacral pressure ulcer   Sepsis ([facility] [address] )     Time reflects when diagnosis was documented in both MDM as applicable and the Disposition within this note     Time User Action Codes Description Comment    3/24/2023 12:27 AM Aneita Chihuahua Add [N30 90] Cystitis     3/24/2023 12:27 AM Aneita Chihuahua Add [T14  8XXA,  L08 9] Wound infection     3/24/2023 12:27 AM Aneita Chihuahua Add [L98 728] Sacral pressure ulcer     3/24/2023 12:27 AM Layvonne Bio S Add [A41 9] Sepsis ([facility] [address] )     3/24/2023  2:31 AM Leory Moons Add [S31 000D] Sacral wound, subsequent encounter     3/24/2023  2:33 AM Leory Moons Add [L89 619] Pressure injury of skin of right heel, unspecified injury stage     3/24/2023  2:29 PM Perez Fleming Add [E46] Protein-calorie malnutrition, unspecified severity ([facility] [address] )     3/24/2023  2:29 PM Perez Fleming Add [R63 6] Underweight       ED Disposition     ED Disposition   Admit    Condition   Stable    Date/Time   Fri Mar 24, 2023 12:28 AM    Comment   Case was discussed with [place] and the patient's admission status was agreed to be Admission Status: inpatient status to the service of Dr Carol Ballard    None         There are no discharge medications for this patient  No discharge procedures on file      PDMP Review     None          ED Provider  Electronically Signed by           Jude Morrow DO  03/25/23 3998

## 2023-03-25 NOTE — ASSESSMENT & PLAN NOTE
· SIRS criteria met on admission: Tachycardia and leukocytosis  · Suspected source pyelonephritis  · No severe sepsis criteria met  · Continue cefepime - ID following  · Blood cultures x2 negative to date

## 2023-03-25 NOTE — ASSESSMENT & PLAN NOTE
· Utilizes external condom catheter - avoiding use in hospital secondary to wound on bottom of penis  · UC - gm negative rods  · BC - no growth to date  · CT with evidence of severe cystitis  · Currently on cefepime and vancomycin per ID  · Place on urinary retention protocol

## 2023-03-25 NOTE — PLAN OF CARE
Problem: MOBILITY - ADULT  Goal: Maintain or return to baseline ADL function  Description: INTERVENTIONS:  -  Assess patient's ability to carry out ADLs; assess patient's baseline for ADL function and identify physical deficits which impact ability to perform ADLs (bathing, care of mouth/teeth, toileting, grooming, dressing, etc )  - Assess/evaluate cause of self-care deficits   - Assess range of motion  - Assess patient's mobility; develop plan if impaired  - Assess patient's need for assistive devices and provide as appropriate  - Encourage maximum independence but intervene and supervise when necessary  - Involve family in performance of ADLs  - Assess for home care needs following discharge   - Consider OT consult to assist with ADL evaluation and planning for discharge  - Provide patient education as appropriate  Outcome: Progressing  Goal: Maintains/Returns to pre admission functional level  Description: INTERVENTIONS:  - Perform BMAT or MOVE assessment daily    - Set and communicate daily mobility goal to care team and patient/family/caregiver  - Collaborate with rehabilitation services on mobility goals if consulted  - Perform Range of Motion 3 times a day  - Reposition patient every 3 hours    - Dangle patient 3 times a day  - Stand patient 3 times a day  - Ambulate patient 3 times a day  - Out of bed to chair 3 times a day   - Out of bed for meals 3 times a day  - Out of bed for toileting  - Record patient progress and toleration of activity level   Outcome: Progressing     Problem: PAIN - ADULT  Goal: Verbalizes/displays adequate comfort level or baseline comfort level  Description: Interventions:  - Encourage patient to monitor pain and request assistance  - Assess pain using appropriate pain scale  - Administer analgesics based on type and severity of pain and evaluate response  - Implement non-pharmacological measures as appropriate and evaluate response  - Consider cultural and social influences on pain and pain management  - Notify physician/advanced practitioner if interventions unsuccessful or patient reports new pain  Outcome: Progressing     Problem: INFECTION - ADULT  Goal: Absence or prevention of progression during hospitalization  Description: INTERVENTIONS:  - Assess and monitor for signs and symptoms of infection  - Monitor lab/diagnostic results  - Monitor all insertion sites, i e  indwelling lines, tubes, and drains  - Monitor endotracheal if appropriate and nasal secretions for changes in amount and color  - [place] appropriate cooling/warming therapies per order  - Administer medications as ordered  - Instruct and encourage patient and family to use good hand hygiene technique  - Identify and instruct in appropriate isolation precautions for identified infection/condition  Outcome: Progressing  Goal: Absence of fever/infection during neutropenic period  Description: INTERVENTIONS:  - Monitor WBC    Outcome: Progressing     Problem: SAFETY ADULT  Goal: Maintain or return to baseline ADL function  Description: INTERVENTIONS:  -  Assess patient's ability to carry out ADLs; assess patient's baseline for ADL function and identify physical deficits which impact ability to perform ADLs (bathing, care of mouth/teeth, toileting, grooming, dressing, etc )  - Assess/evaluate cause of self-care deficits   - Assess range of motion  - Assess patient's mobility; develop plan if impaired  - Assess patient's need for assistive devices and provide as appropriate  - Encourage maximum independence but intervene and supervise when necessary  - Involve family in performance of ADLs  - Assess for home care needs following discharge   - Consider OT consult to assist with ADL evaluation and planning for discharge  - Provide patient education as appropriate  Outcome: Progressing  Goal: Maintains/Returns to pre admission functional level  Description: INTERVENTIONS:  - Perform BMAT or MOVE assessment daily    - Set and communicate daily mobility goal to care team and patient/family/caregiver  - Collaborate with rehabilitation services on mobility goals if consulted  - Perform Range of Motion 3 times a day  - Reposition patient every 3 hours    - Dangle patient 3 times a day  - Stand patient 3 times a day  - Ambulate patient 3 times a day  - Out of bed to chair 3 times a day   - Out of bed for meals 3 times a day  - Out of bed for toileting  - Record patient progress and toleration of activity level   Outcome: Progressing  Goal: Patient will remain free of falls  Description: INTERVENTIONS:  - Educate patient/family on patient safety including physical limitations  - Instruct patient to call for assistance with activity   - Consult OT/PT to assist with strengthening/mobility   - Keep Call bell within reach  - Keep bed low and locked with side rails adjusted as appropriate  - Keep care items and personal belongings within reach  - Initiate and maintain comfort rounds  - Make Fall Risk Sign visible to staff  - Offer Toileting every 2 Hours, in advance of need  - Initiate/Maintain alarm  - Obtain necessary fall risk management equipment  - Apply yellow socks and bracelet for high fall risk patients  - Consider moving patient to room near nurses station  Outcome: Progressing     Problem: DISCHARGE PLANNING  Goal: Discharge to home or other facility with appropriate resources  Description: INTERVENTIONS:  - Identify barriers to discharge w/patient and caregiver  - Arrange for needed discharge resources and transportation as appropriate  - Identify discharge learning needs (meds, wound care, etc )  - Arrange for interpretive services to assist at discharge as needed  - Refer to Case Management Department for coordinating discharge planning if the patient needs post-hospital services based on physician/advanced practitioner order or complex needs related to functional status, cognitive ability, or social support system  Outcome: Progressing     Problem: Knowledge Deficit  Goal: Patient/family/caregiver demonstrates understanding of disease process, treatment plan, medications, and discharge instructions  Description: Complete learning assessment and assess knowledge base  Interventions:  - Provide teaching at level of understanding  - Provide teaching via preferred learning methods  Outcome: Progressing     Problem: Prexisting or High Potential for Compromised Skin Integrity  Goal: Skin integrity is maintained or improved  Description: INTERVENTIONS:  - Identify patients at risk for skin breakdown  - Assess and monitor skin integrity  - Assess and monitor nutrition and hydration status  - Monitor labs   - Assess for incontinence   - Turn and reposition patient  - Assist with mobility/ambulation  - Relieve pressure over bony prominences  - Avoid friction and shearing  - Provide appropriate hygiene as needed including keeping skin clean and dry  - Evaluate need for skin moisturizer/barrier cream  - Collaborate with interdisciplinary team   - Patient/family teaching  - Consider wound care consult   Outcome: Progressing     Problem: Nutrition/Hydration-ADULT  Goal: Nutrient/Hydration intake appropriate for improving, restoring or maintaining nutritional needs  Description: Monitor and assess patient's nutrition/hydration status for malnutrition  Collaborate with interdisciplinary team and initiate plan and interventions as ordered  Monitor patient's weight and dietary intake as ordered or per policy  Utilize nutrition screening tool and intervene as necessary  Determine patient's food preferences and provide high-protein, high-caloric foods as appropriate       INTERVENTIONS:  - Monitor oral intake, urinary output, labs, and treatment plans  - Assess nutrition and hydration status and recommend course of action  - Evaluate amount of meals eaten  - Assist patient with eating if necessary   - Allow adequate time for meals  - Recommend/ encourage appropriate diets, oral nutritional supplements, and vitamin/mineral supplements  - Order, calculate, and assess calorie counts as needed  - Recommend, monitor, and adjust tube feedings and TPN/PPN based on assessed needs  - Assess need for intravenous fluids  - Provide specific nutrition/hydration education as appropriate  - Include patient/family/caregiver in decisions related to nutrition  Outcome: Progressing

## 2023-03-25 NOTE — PLAN OF CARE
Problem: MOBILITY - ADULT  Goal: Maintain or return to baseline ADL function  Description: INTERVENTIONS:  -  Assess patient's ability to carry out ADLs; assess patient's baseline for ADL function and identify physical deficits which impact ability to perform ADLs (bathing, care of mouth/teeth, toileting, grooming, dressing, etc )  - Assess/evaluate cause of self-care deficits   - Assess range of motion  - Assess patient's mobility; develop plan if impaired  - Assess patient's need for assistive devices and provide as appropriate  - Encourage maximum independence but intervene and supervise when necessary  - Involve family in performance of ADLs  - Assess for home care needs following discharge   - Consider OT consult to assist with ADL evaluation and planning for discharge  - Provide patient education as appropriate  Outcome: Progressing  Goal: Maintains/Returns to pre admission functional level  Description: INTERVENTIONS:  - Perform BMAT or MOVE assessment daily    - Set and communicate daily mobility goal to care team and patient/family/caregiver  - Collaborate with rehabilitation services on mobility goals if consulted  - Perform Range of Motion 4 times a day  - Reposition patient every 2 hours    - Dangle patient 3 times a day  - Out of bed for toileting  - Record patient progress and toleration of activity level   Outcome: Progressing     Problem: PAIN - ADULT  Goal: Verbalizes/displays adequate comfort level or baseline comfort level  Description: Interventions:  - Encourage patient to monitor pain and request assistance  - Assess pain using appropriate pain scale  - Administer analgesics based on type and severity of pain and evaluate response  - Implement non-pharmacological measures as appropriate and evaluate response  - Consider cultural and social influences on pain and pain management  - Notify physician/advanced practitioner if interventions unsuccessful or patient reports new pain  Outcome: Progressing     Problem: INFECTION - ADULT  Goal: Absence or prevention of progression during hospitalization  Description: INTERVENTIONS:  - Assess and monitor for signs and symptoms of infection  - Monitor lab/diagnostic results  - Monitor all insertion sites, i e  indwelling lines, tubes, and drains  - Monitor endotracheal if appropriate and nasal secretions for changes in amount and color  - [place] appropriate cooling/warming therapies per order  - Administer medications as ordered  - Instruct and encourage patient and family to use good hand hygiene technique  - Identify and instruct in appropriate isolation precautions for identified infection/condition  Outcome: Progressing  Goal: Absence of fever/infection during neutropenic period  Description: INTERVENTIONS:  - Monitor WBC    Outcome: Progressing     Problem: SAFETY ADULT  Goal: Maintain or return to baseline ADL function  Description: INTERVENTIONS:  -  Assess patient's ability to carry out ADLs; assess patient's baseline for ADL function and identify physical deficits which impact ability to perform ADLs (bathing, care of mouth/teeth, toileting, grooming, dressing, etc )  - Assess/evaluate cause of self-care deficits   - Assess range of motion  - Assess patient's mobility; develop plan if impaired  - Assess patient's need for assistive devices and provide as appropriate  - Encourage maximum independence but intervene and supervise when necessary  - Involve family in performance of ADLs  - Assess for home care needs following discharge   - Consider OT consult to assist with ADL evaluation and planning for discharge  - Provide patient education as appropriate  Outcome: Progressing  Goal: Maintains/Returns to pre admission functional level  Description: INTERVENTIONS:  - Perform BMAT or MOVE assessment daily    - Set and communicate daily mobility goal to care team and patient/family/caregiver     - Collaborate with rehabilitation services on mobility goals if consulted  - Out of bed for toileting  - Record patient progress and toleration of activity level   Outcome: Progressing  Goal: Patient will remain free of falls  Description: INTERVENTIONS:  - Educate patient/family on patient safety including physical limitations  - Instruct patient to call for assistance with activity   - Consult OT/PT to assist with strengthening/mobility   - Keep Call bell within reach  - Keep bed low and locked with side rails adjusted as appropriate  - Keep care items and personal belongings within reach  - Initiate and maintain comfort rounds  - Make Fall Risk Sign visible to staff  - Offer Toileting every 2 Hours, in advance of need  - Initiate/Maintain bed alarm  - Apply yellow socks and bracelet for high fall risk patients  - Consider moving patient to room near nurses station  Outcome: Progressing     Problem: DISCHARGE PLANNING  Goal: Discharge to home or other facility with appropriate resources  Description: INTERVENTIONS:  - Identify barriers to discharge w/patient and caregiver  - Arrange for needed discharge resources and transportation as appropriate  - Identify discharge learning needs (meds, wound care, etc )  - Arrange for interpretive services to assist at discharge as needed  - Refer to Case Management Department for coordinating discharge planning if the patient needs post-hospital services based on physician/advanced practitioner order or complex needs related to functional status, cognitive ability, or social support system  Outcome: Progressing     Problem: Knowledge Deficit  Goal: Patient/family/caregiver demonstrates understanding of disease process, treatment plan, medications, and discharge instructions  Description: Complete learning assessment and assess knowledge base    Interventions:  - Provide teaching at level of understanding  - Provide teaching via preferred learning methods  Outcome: Progressing     Problem: Prexisting or High Potential for Compromised Skin Integrity  Goal: Skin integrity is maintained or improved  Description: INTERVENTIONS:  - Identify patients at risk for skin breakdown  - Assess and monitor skin integrity  - Assess and monitor nutrition and hydration status  - Monitor labs   - Assess for incontinence   - Turn and reposition patient  - Assist with mobility/ambulation  - Relieve pressure over bony prominences  - Avoid friction and shearing  - Provide appropriate hygiene as needed including keeping skin clean and dry  - Evaluate need for skin moisturizer/barrier cream  - Collaborate with interdisciplinary team   - Patient/family teaching  - Consider wound care consult   Outcome: Progressing     Problem: Nutrition/Hydration-ADULT  Goal: Nutrient/Hydration intake appropriate for improving, restoring or maintaining nutritional needs  Description: Monitor and assess patient's nutrition/hydration status for malnutrition  Collaborate with interdisciplinary team and initiate plan and interventions as ordered  Monitor patient's weight and dietary intake as ordered or per policy  Utilize nutrition screening tool and intervene as necessary  Determine patient's food preferences and provide high-protein, high-caloric foods as appropriate       INTERVENTIONS:  - Monitor oral intake, urinary output, labs, and treatment plans  - Assess nutrition and hydration status and recommend course of action  - Evaluate amount of meals eaten  - Assist patient with eating if necessary   - Allow adequate time for meals  - Recommend/ encourage appropriate diets, oral nutritional supplements, and vitamin/mineral supplements  - Order, calculate, and assess calorie counts as needed  - Recommend, monitor, and adjust tube feedings and TPN/PPN based on assessed needs  - Assess need for intravenous fluids  - Provide specific nutrition/hydration education as appropriate  - Include patient/family/caregiver in decisions related to nutrition  Outcome: Progressing

## 2023-03-25 NOTE — ASSESSMENT & PLAN NOTE
· Sodium at 130 on admission, now 134   Suspect in the setting of volume depletion  · Baseline sodium ranges between 132-137  · Given 1 L normal saline bolus in the ER  · Hold further IV fluids for now

## 2023-03-25 NOTE — PROGRESS NOTES
Ellen Choe is a 50 y o  male who is currently ordered Vancomycin IV with management by the Pharmacy Consult service  Relevant clinical data and objective / subjective history reviewed  Vancomycin Assessment:  Indication and Goal AUC/Trough: Soft tissue (goal -600, trough >10)  Clinical Status: stable  Micro:     Renal Function:  SCr: 0 39 mg/dL  CrCl: 117 9 mL/min  Renal replacement: Not on dialysis  Days of Therapy: 3  Current Dose: 1000mg every 12 hours  Vancomycin Plan: random level is 24 2 (extrapolated trough is ~ 13 5, which is therapeutic)  Continue current dose and will draw an actual trough as listed below 30 minutes before dose  New Dosing: no change  Estimated AUC: 512 mcg*hr/mL  Estimated Trough: 13 1 mcg/mL  Next Level: trough at 3/28/23 at 1130  Renal Function Monitoring: Daily BMP and UOP  Pharmacy will continue to follow closely for s/sx of nephrotoxicity, infusion reactions and appropriateness of therapy  BMP and CBC will be ordered per protocol  We will continue to follow the patient’s culture results and clinical progress daily      Sang Zuniga, Pharmacist, PharmD, BCPS

## 2023-03-25 NOTE — ASSESSMENT & PLAN NOTE
· Performed at [facility] 3/14/2022 by Dr Buffy Reddy for definitive management of decubitus ulcer  · Presents with ulcer to RLE  · Podiatry consulted for lower extremity wounds

## 2023-03-25 NOTE — PROGRESS NOTES
[place]  Progress Note  Name: Walker Guan  MRN: [id number]  Unit/Bed#: -01 I Date of Admission: 3/23/2023   Date of Service: 3/25/2023 I Hospital Day: 1    Assessment/Plan   * Pyelonephritis  Assessment & Plan  · Utilizes external condom catheter - avoiding use in hospital secondary to wound on bottom of penis  · UC - gm negative rods  · BC - no growth to date  · CT with evidence of severe cystitis  · Currently on cefepime per ID  · Place on urinary retention protocol     Sepsis without acute organ dysfunction (HCC)  Assessment & Plan  · SIRS criteria met on admission: Tachycardia and leukocytosis  · Suspected source pyelonephritis  · No severe sepsis criteria met  · Continue cefepime - ID following  · Blood cultures x2 negative to date    Hyponatremia  Assessment & Plan  · Sodium at 130 on admission, now 134  Suspect in the setting of volume depletion  · Baseline sodium ranges between 132-137  · Given 1 L normal saline bolus in the ER  · Hold further IV fluids for now    Sacral wound, subsequent encounter  Assessment & Plan  · 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  · Reports increasing size and drainage over the last 1 week  · Surgery consult appreciated  · Continue local wound care  · No need for debridement at this point  Paraplegia [facility])  Assessment & Plan  · Status post work injury  · Fairly independent with self-reported wheelchair and handicap vehicle  · Unable to care for sacral wounds himself at home, looking for placement until wounds are healed    Patient is currently homeless and lives in his car  · CM consulted    S/P BKA (below knee amputation), left [facility])  Assessment & Plan  · Performed at [facility] 3/14/2022 by Dr Enma Jones for definitive management of decubitus ulcer  · Presents with ulcer to RLE  · Podiatry consulted for lower extremity wounds    Status post colostomy [facility])  Assessment & Plan  · Patient reports normal output of colostomy -unable to visualize stoma on admission as patient refuses ostomy bag change    Personal history of venous thrombosis and embolism  Assessment & Plan  · Status post IVC filter               VTE Pharmacologic Prophylaxis: VTE Score: 5 High Risk (Score >/= 5) - Pharmacological DVT Prophylaxis Ordered: enoxaparin (Lovenox)  Sequential Compression Devices Ordered  Patient Centered Rounds: I performed bedside rounds with nursing staff today  Discussions with Specialists or Other Care Team Provider: case management    Education and Discussions with Family / Patient: Patient declined call to   Total Time Spent on Date of Encounter in care of patient: 35 minutes This time was spent on one or more of the following: performing physical exam; counseling and coordination of care; obtaining or reviewing history; documenting in the medical record; reviewing/ordering tests, medications or procedures; communicating with other healthcare professionals and discussing with patient's family/caregivers  Current Length of Stay: 1 day(s)  Current Patient Status: Inpatient   Certification Statement: The patient will continue to require additional inpatient hospital stay due to IV antibiotics  Discharge Plan: Anticipate discharge in 48-72 hrs to rehab facility  Code Status: Level 1 - Full Code    Subjective:   Asking to use a condom catheter and would like fluid restriction removed  Overall feeling ok  Objective:     Vitals:   Temp (24hrs), Av 2 °F (36 8 °C), Min:98 1 °F (36 7 °C), Max:98 2 °F (36 8 °C)    Temp:  [98 1 °F (36 7 °C)-98 2 °F (36 8 °C)] 98 2 °F (36 8 °C)  HR:  [75-76] 76  BP: (104-106)/(53-54) 106/54  SpO2:  [97 %-99 %] 97 %  Body mass index is 12 33 kg/m²  Input and Output Summary (last 24 hours):      Intake/Output Summary (Last 24 hours) at 3/25/2023 1430  Last data filed at 3/25/2023 1221  Gross per 24 hour Intake 530 ml   Output 235 ml   Net 295 ml       Physical Exam:   Physical Exam  Vitals and nursing note reviewed  Constitutional:       General: He is not in acute distress  Appearance: He is well-developed  Comments: thin   HENT:      Head: Normocephalic and atraumatic  Eyes:      Conjunctiva/sclera: Conjunctivae normal    Cardiovascular:      Rate and Rhythm: Normal rate and regular rhythm  Heart sounds: No murmur heard  Pulmonary:      Effort: Pulmonary effort is normal  No respiratory distress  Breath sounds: Normal breath sounds  Abdominal:      Palpations: Abdomen is soft  Tenderness: There is no abdominal tenderness  Genitourinary:     Comments: Linear wound on underside of penis  Musculoskeletal:         General: Deformity present  No swelling  Cervical back: Neck supple  Skin:     General: Skin is warm and dry  Comments: Decub wounds   Neurological:      Mental Status: He is alert  Psychiatric:         Mood and Affect: Mood normal          Additional Data:     Labs:  Results from last 7 days   Lab Units 03/25/23 0518   WBC Thousand/uL 6 95   HEMOGLOBIN g/dL 8 8*   HEMATOCRIT % 27 2*   PLATELETS Thousands/uL 727*   NEUTROS PCT % 68   LYMPHS PCT % 17   MONOS PCT % 9   EOS PCT % 5     Results from last 7 days   Lab Units 03/25/23 0518 03/24/23 0323 03/23/23 2118   SODIUM mmol/L 134*   < > 130*   POTASSIUM mmol/L 3 7   < > 3 7   CHLORIDE mmol/L 107   < > 98   CO2 mmol/L 24   < > 23   BUN mg/dL 5   < > 6   CREATININE mg/dL 0 39*   < > 0 51*   ANION GAP mmol/L 3*   < > 9   CALCIUM mg/dL 7 6*   < > 8 4   ALBUMIN g/dL  --   --  3 1*   TOTAL BILIRUBIN mg/dL  --   --  0 29   ALK PHOS U/L  --   --  99   ALT U/L  --   --  5*   AST U/L  --   --  8*   GLUCOSE RANDOM mg/dL 94   < > 120    < > = values in this interval not displayed       Results from last 7 days   Lab Units 03/23/23 2118   INR  1 04             Results from last 7 days   Lab Units 03/25/23 0518 03/24/23  0323 03/23/23 2118   LACTIC ACID mmol/L  --   --  1 0   PROCALCITONIN ng/ml 17 87* 43 37* 50 61*       Lines/Drains:  Invasive Devices     Peripheral Intravenous Line  Duration           Peripheral IV 03/23/23 Dorsal (posterior); Left Forearm 1 day    Peripheral IV 03/23/23 Dorsal (posterior); Left Hand 1 day          Drain  Duration           Ileostomy LLQ -- days    External Urinary Catheter Medium <1 day                      Imaging: Reviewed radiology reports from this admission including: abdominal/pelvic CT    Recent Cultures (last 7 days):   Results from last 7 days   Lab Units 03/23/23  2218 03/23/23 2217 03/23/23 2120   BLOOD CULTURE   --  No Growth at 24 hrs  No Growth at 24 hrs     GRAM STAIN RESULT   --  No polys seen*  1+ Gram positive cocci in pairs*  --    URINE CULTURE  >100,000 cfu/ml Gram Negative Giles*  --   --        Last 24 Hours Medication List:   Current Facility-Administered Medications   Medication Dose Route Frequency Provider Last Rate   • acetaminophen  650 mg Oral Q6H PRN Omero West PA-C     • aluminum-magnesium hydroxide-simethicone  30 mL Oral Q6H PRN Omero West PA-C     • cefepime  2,000 mg Intravenous Q12H Luisa Martinez MD 2,000 mg (03/25/23 3028)   • collagenase   Topical Daily Nery Fleming PA-C     • enoxaparin  30 mg Subcutaneous Daily Omero West PA-C     • HYDROmorphone  0 5 mg Intravenous Q3H PRN Fabi Chung MD     • nicotine  1 patch Transdermal Daily PRN Omero West PA-C     • ondansetron  4 mg Intravenous Q4H PRN Fabi Chung MD     • oxyCODONE  2 5 mg Oral Q4H PRN Fabi Chung MD      Or   • oxyCODONE  5 mg Oral Q4H PRN Fabi Chung MD     • senna  1 tablet Oral HS PRN Omero West PA-C     • vancomycin  1,000 mg Intravenous Q12H Omero West PA-C 1,000 mg (03/25/23 1221)        Today, Patient Was Seen By: Brendan Kelley PA-C    **Please Note: This note may have been constructed using a voice recognition system  **

## 2023-03-25 NOTE — ASSESSMENT & PLAN NOTE
· Status post work injury  · Fairly independent with self-reported wheelchair and handicap vehicle  · Unable to care for sacral wounds himself at home, looking for placement until wounds are healed    Patient is currently homeless and lives in his car  · CM consulted

## 2023-03-25 NOTE — CONSULTS
Consult - Podiatry   Samanthamichela Hairston 50 y o  male MRN: [id number]  Unit/Bed#: -01 Encounter: [id number]    Assessment/Plan     Pressure ulcer stage III right heel  Pressure ulcer stage III right foot (lateral TMA site)  · Recommend Santyl daily to all wounds  · Prevalon boot and green foam wedge for appropriate offloading of pressure to heel and forefoot  · Recommend follow-up at wound care upon discharge    Status post TMA right foot, Status post BKA left leg, Hypoesthesia secondary to paraplegia, sepsis without acute organ dysfunction, hyponatremia, sacral wound with underlying osteomyelitis, cystitis, status post colostomy, history of PE with IVC filter  · As per internal medicine, infectious disease, and general surgery    History of Present Illness     HPI:  Edinson Herron is a 50 y o  male who presents with multiple pressure wounds  Podiatry consult requested to evaluate right foot/heel wound  Significant past medical history of paraplegia secondary to spinal trauma  Patient's chart reviewed noting all pertinent clinical laboratory data  Inpatient consult to Podiatry  Consult performed by: Leelee Walker DPM  Consult ordered by: Elmo Joshua PA-C        Review of Systems   Constitutional: Negative  HENT: Negative  Eyes: Negative  Respiratory: Negative  Cardiovascular: Negative, denies any shortness of breath  Gastrointestinal: Negative      Musculoskeletal: Nonambulatory secondary to paraplegia  Skin: Multiple pressure wounds with focus on right foot  Neurological: Hypoesthesia secondary to paraplegia      Historical Information   Past Medical History:   Diagnosis Date   • Osteomyelitis [facility])      Past Surgical History:   Procedure Laterality Date   • BELOW KNEE LEG AMPUTATION Left     March, 2022   • FEMUR FRACTURE SURGERY Right    • HIP ARTHROSCOPY Right    • LEG SURGERY Right     Tib,Fib   • SPINAL CORD DECOMPRESSION       Social History   Social History "    Substance and Sexual Activity   Alcohol Use Never     Social History     Substance and Sexual Activity   Drug Use Never     Social History     Tobacco Use   Smoking Status Every Day   • Types: Cigarettes   • Passive exposure: Never   Smokeless Tobacco Never     Family History: History reviewed  No pertinent family history  Meds/Allergies   No medications prior to admission  Allergies   Allergen Reactions   • Levaquin [Levofloxacin] Rash   • Morphine Rash       Objective   First Vitals:   Blood Pressure: 125/70 (03/23/23 1755)  Pulse: (!) 112 (03/23/23 1755)  Temperature: 98 1 °F (36 7 °C) (03/23/23 1755)  Temp Source: Temporal (03/23/23 1755)  Respirations: 20 (03/23/23 1755)  Height: 5' 7\" (170 2 cm) (03/23/23 1755)  Weight - Scale: 47 6 kg (105 lb) (03/23/23 1755)  SpO2: 98 % (03/23/23 1755)    Current Vitals:   Blood Pressure: 106/54 (03/25/23 0819)  Pulse: 76 (03/25/23 0819)  Temperature: 98 2 °F (36 8 °C) (03/25/23 0819)  Temp Source: Temporal (03/23/23 1755)  Respirations: 14 (03/24/23 0700)  Height: 5' 7\" (170 2 cm) (03/23/23 1755)  Weight - Scale: 35 7 kg (78 lb 11 3 oz) (03/25/23 0600)  SpO2: 97 % (03/25/23 0819)        /54   Pulse 76   Temp 98 2 °F (36 8 °C)   Resp 14   Ht 5' 7\" (1 702 m)   Wt 35 7 kg (78 lb 11 3 oz)   SpO2 97%   BMI 12 33 kg/m²     General Appearance:    Alert, cooperative, no distress, resting comfortably in bed, no acute pedal concerns  Head:    Normocephalic, without obvious abnormality, atraumatic   Eyes:    PERRL, conjunctiva/corneas clear, EOM's intact            Nose:   Moist mucous membranes, no drainage or sinus tenderness   Throat:   No tenderness, no exudates   Neck:   Supple, symmetrical, trachea midline, no JVD   Back:     Symmetric, no CVA tenderness   Lungs:     Respirations unlabored   Chest wall:    No tenderness or deformity   Heart:    Rate and rhythm noted on last vitals      Abdomen:     Soft, non-tender, bowel sounds active all four " quadrants,     no masses, no organomegaly       Lower Ext/Ortho:  Status post BKA on left, status post TMA on right     Neuro/Vasc: CNII-XII intact  Normal strength  Sensation and reflexes: Hypoesthesia with loss of protective sensation, 0/5 muscle strength right lower extremity  Pulses: Right: DP 1/4, PT 1/4,   Capillary Filling: 3 sec, Edema: None right lower extremity     Skin: Texture, Tone and Turgor: Diminished, Digital Hair: None  Atrophic Changes: Moderate  Lesions: None  Nail Pathology: None  Ulcers/Wounds:   · Posterior right heel has necrotic full-thickness ulcerative breakdown consistent with stage III pressure ulcer, no signs of infection, wound measures approximately 4 0 x 4 0 x 0 2 cm, 30% bleeding red wound base, 40% slough, and 30% hyperkeratotic eschar  · Distal lateral TMA: 1 5 x 0 8 x 0 2 cm full-thickness breakdown, 95% yellow slough base, no signs of infection, moderate serosanguineous drainage, no signs of infection  Lab Results:   CBC w/diff  Results from last 7 days   Lab Units 03/25/23  0518   WBC Thousand/uL 6 95   HEMOGLOBIN g/dL 8 8*   HEMATOCRIT % 27 2*   PLATELETS Thousands/uL 727*   NEUTROS PCT % 68   LYMPHS PCT % 17   MONOS PCT % 9   EOS PCT % 5     BMP  Results from last 7 days   Lab Units 03/25/23  0518   POTASSIUM mmol/L 3 7   CHLORIDE mmol/L 107   CO2 mmol/L 24   BUN mg/dL 5   CREATININE mg/dL 0 39*   CALCIUM mg/dL 7 6*     CMP  Results from last 7 days   Lab Units 03/25/23  0518 03/24/23  0323 03/23/23  2118   POTASSIUM mmol/L 3 7   < > 3 7   CHLORIDE mmol/L 107   < > 98   CO2 mmol/L 24   < > 23   BUN mg/dL 5   < > 6   CREATININE mg/dL 0 39*   < > 0 51*   CALCIUM mg/dL 7 6*   < > 8 4   ALK PHOS U/L  --   --  99   ALT U/L  --   --  5*   AST U/L  --   --  8*    < > = values in this interval not displayed       @Culture@  Lab Results   Component Value Date    BLOODCX No Growth at 24 hrs  03/23/2023    BLOODCX No Growth at 24 hrs  03/23/2023    URINECX >100,000 cfu/ml Gram Negative Giles (A) 03/23/2023     No results found for: WOUNDCULT      Imaging: I have personally reviewed pertinent films in PACS      EKG, Pathology, and Other Studies: I have personally reviewed pertinent reports  Code Status: Level 1 - Full Code  Advance Directive and Living Will:      Power of :      Please Note: Voice to text dictation software was used in the creation of this document         Georges Anderson DPM

## 2023-03-26 LAB
ANION GAP SERPL CALCULATED.3IONS-SCNC: 5 MMOL/L (ref 4–13)
BACTERIA UR CULT: ABNORMAL
BASOPHILS # BLD AUTO: 0.02 THOUSANDS/ÂΜL (ref 0–0.1)
BASOPHILS NFR BLD AUTO: 0 % (ref 0–1)
BUN SERPL-MCNC: 6 MG/DL (ref 5–25)
CALCIUM SERPL-MCNC: 8 MG/DL (ref 8.4–10.2)
CHLORIDE SERPL-SCNC: 106 MMOL/L (ref 96–108)
CO2 SERPL-SCNC: 25 MMOL/L (ref 21–32)
CREAT SERPL-MCNC: 0.42 MG/DL (ref 0.6–1.3)
EOSINOPHIL # BLD AUTO: 0.35 THOUSAND/ÂΜL (ref 0–0.61)
EOSINOPHIL NFR BLD AUTO: 5 % (ref 0–6)
ERYTHROCYTE [DISTWIDTH] IN BLOOD BY AUTOMATED COUNT: 17.4 % (ref 11.6–15.1)
GFR SERPL CREATININE-BSD FRML MDRD: 135 ML/MIN/1.73SQ M
GLUCOSE SERPL-MCNC: 86 MG/DL (ref 65–140)
HCT VFR BLD AUTO: 28.4 % (ref 36.5–49.3)
HGB BLD-MCNC: 9.2 G/DL (ref 12–17)
IMM GRANULOCYTES # BLD AUTO: 0.02 THOUSAND/UL (ref 0–0.2)
IMM GRANULOCYTES NFR BLD AUTO: 0 % (ref 0–2)
LYMPHOCYTES # BLD AUTO: 1.5 THOUSANDS/ÂΜL (ref 0.6–4.47)
LYMPHOCYTES NFR BLD AUTO: 22 % (ref 14–44)
MCH RBC QN AUTO: 27.3 PG (ref 26.8–34.3)
MCHC RBC AUTO-ENTMCNC: 32.4 G/DL (ref 31.4–37.4)
MCV RBC AUTO: 84 FL (ref 82–98)
MONOCYTES # BLD AUTO: 0.63 THOUSAND/ÂΜL (ref 0.17–1.22)
MONOCYTES NFR BLD AUTO: 9 % (ref 4–12)
NEUTROPHILS # BLD AUTO: 4.32 THOUSANDS/ÂΜL (ref 1.85–7.62)
NEUTS SEG NFR BLD AUTO: 64 % (ref 43–75)
NRBC BLD AUTO-RTO: 0 /100 WBCS
PLATELET # BLD AUTO: 728 THOUSANDS/UL (ref 149–390)
PMV BLD AUTO: 7.8 FL (ref 8.9–12.7)
POTASSIUM SERPL-SCNC: 4.2 MMOL/L (ref 3.5–5.3)
RBC # BLD AUTO: 3.37 MILLION/UL (ref 3.88–5.62)
SODIUM SERPL-SCNC: 136 MMOL/L (ref 135–147)
WBC # BLD AUTO: 6.84 THOUSAND/UL (ref 4.31–10.16)

## 2023-03-26 RX ORDER — CEFTRIAXONE 1 G/50ML
1000 INJECTION, SOLUTION INTRAVENOUS EVERY 24 HOURS
Status: DISCONTINUED | OUTPATIENT
Start: 2023-03-26 | End: 2023-03-28

## 2023-03-26 RX ADMIN — HYDROMORPHONE HYDROCHLORIDE 0.5 MG: 1 INJECTION, SOLUTION INTRAMUSCULAR; INTRAVENOUS; SUBCUTANEOUS at 01:43

## 2023-03-26 RX ADMIN — HYDROMORPHONE HYDROCHLORIDE 0.5 MG: 1 INJECTION, SOLUTION INTRAMUSCULAR; INTRAVENOUS; SUBCUTANEOUS at 13:18

## 2023-03-26 RX ADMIN — OXYCODONE HYDROCHLORIDE 5 MG: 5 TABLET ORAL at 19:48

## 2023-03-26 RX ADMIN — ENOXAPARIN SODIUM 30 MG: 100 INJECTION SUBCUTANEOUS at 08:57

## 2023-03-26 RX ADMIN — HYDROMORPHONE HYDROCHLORIDE 0.5 MG: 1 INJECTION, SOLUTION INTRAMUSCULAR; INTRAVENOUS; SUBCUTANEOUS at 09:04

## 2023-03-26 RX ADMIN — OXYCODONE HYDROCHLORIDE 5 MG: 5 TABLET ORAL at 06:08

## 2023-03-26 RX ADMIN — OXYCODONE HYDROCHLORIDE 5 MG: 5 TABLET ORAL at 00:26

## 2023-03-26 RX ADMIN — HYDROMORPHONE HYDROCHLORIDE 0.5 MG: 1 INJECTION, SOLUTION INTRAMUSCULAR; INTRAVENOUS; SUBCUTANEOUS at 16:34

## 2023-03-26 RX ADMIN — CEFTRIAXONE 1000 MG: 1 INJECTION, SOLUTION INTRAVENOUS at 10:46

## 2023-03-26 RX ADMIN — VANCOMYCIN HYDROCHLORIDE 1000 MG: 1 INJECTION, SOLUTION INTRAVENOUS at 00:26

## 2023-03-26 RX ADMIN — HYDROMORPHONE HYDROCHLORIDE 0.5 MG: 1 INJECTION, SOLUTION INTRAMUSCULAR; INTRAVENOUS; SUBCUTANEOUS at 21:59

## 2023-03-26 RX ADMIN — OXYCODONE HYDROCHLORIDE 5 MG: 5 TABLET ORAL at 11:43

## 2023-03-26 RX ADMIN — COLLAGENASE SANTYL: 250 OINTMENT TOPICAL at 08:57

## 2023-03-26 RX ADMIN — CEFEPIME HYDROCHLORIDE 2000 MG: 2 INJECTION, SOLUTION INTRAVENOUS at 04:22

## 2023-03-26 NOTE — PLAN OF CARE
Problem: MOBILITY - ADULT  Goal: Maintain or return to baseline ADL function  Description: INTERVENTIONS:  -  Assess patient's ability to carry out ADLs; assess patient's baseline for ADL function and identify physical deficits which impact ability to perform ADLs (bathing, care of mouth/teeth, toileting, grooming, dressing, etc )  - Assess/evaluate cause of self-care deficits   - Assess range of motion  - Assess patient's mobility; develop plan if impaired  - Assess patient's need for assistive devices and provide as appropriate  - Encourage maximum independence but intervene and supervise when necessary  - Involve family in performance of ADLs  - Assess for home care needs following discharge   - Consider OT consult to assist with ADL evaluation and planning for discharge  - Provide patient education as appropriate  Outcome: Progressing  Goal: Maintains/Returns to pre admission functional level  Description: INTERVENTIONS:  - Perform BMAT or MOVE assessment daily    - Set and communicate daily mobility goal to care team and patient/family/caregiver  - Collaborate with rehabilitation services on mobility goals if consulted  - Perform Range of Motion 3 times a day  - Reposition patient every 3 hours    - Dangle patient 3 times a day  - Stand patient 3 times a day  - Ambulate patient 3 times a day  - Out of bed to chair 3 times a day   - Out of bed for meals 3 times a day  - Out of bed for toileting  - Record patient progress and toleration of activity level   Outcome: Progressing     Problem: PAIN - ADULT  Goal: Verbalizes/displays adequate comfort level or baseline comfort level  Description: Interventions:  - Encourage patient to monitor pain and request assistance  - Assess pain using appropriate pain scale  - Administer analgesics based on type and severity of pain and evaluate response  - Implement non-pharmacological measures as appropriate and evaluate response  - Consider cultural and social influences on pain and pain management  - Notify physician/advanced practitioner if interventions unsuccessful or patient reports new pain  Outcome: Progressing     Problem: INFECTION - ADULT  Goal: Absence or prevention of progression during hospitalization  Description: INTERVENTIONS:  - Assess and monitor for signs and symptoms of infection  - Monitor lab/diagnostic results  - Monitor all insertion sites, i e  indwelling lines, tubes, and drains  - Monitor endotracheal if appropriate and nasal secretions for changes in amount and color  - [place] appropriate cooling/warming therapies per order  - Administer medications as ordered  - Instruct and encourage patient and family to use good hand hygiene technique  - Identify and instruct in appropriate isolation precautions for identified infection/condition  Outcome: Progressing  Goal: Absence of fever/infection during neutropenic period  Description: INTERVENTIONS:  - Monitor WBC    Outcome: Progressing     Problem: SAFETY ADULT  Goal: Maintain or return to baseline ADL function  Description: INTERVENTIONS:  -  Assess patient's ability to carry out ADLs; assess patient's baseline for ADL function and identify physical deficits which impact ability to perform ADLs (bathing, care of mouth/teeth, toileting, grooming, dressing, etc )  - Assess/evaluate cause of self-care deficits   - Assess range of motion  - Assess patient's mobility; develop plan if impaired  - Assess patient's need for assistive devices and provide as appropriate  - Encourage maximum independence but intervene and supervise when necessary  - Involve family in performance of ADLs  - Assess for home care needs following discharge   - Consider OT consult to assist with ADL evaluation and planning for discharge  - Provide patient education as appropriate  Outcome: Progressing  Goal: Maintains/Returns to pre admission functional level  Description: INTERVENTIONS:  - Perform BMAT or MOVE assessment daily    - Set and communicate daily mobility goal to care team and patient/family/caregiver  - Collaborate with rehabilitation services on mobility goals if consulted  - Perform Range of Motion 3 times a day  - Reposition patient every 3 hours    - Dangle patient 3 times a day  - Stand patient 3 times a day  - Ambulate patient 3 times a day  - Out of bed to chair 3 times a day   - Out of bed for meals 3 times a day  - Out of bed for toileting  - Record patient progress and toleration of activity level   Outcome: Progressing  Goal: Patient will remain free of falls  Description: INTERVENTIONS:  - Educate patient/family on patient safety including physical limitations  - Instruct patient to call for assistance with activity   - Consult OT/PT to assist with strengthening/mobility   - Keep Call bell within reach  - Keep bed low and locked with side rails adjusted as appropriate  - Keep care items and personal belongings within reach  - Initiate and maintain comfort rounds  - Make Fall Risk Sign visible to staff  - Offer Toileting every 2 Hours, in advance of need  - Initiate/Maintain alarm  - Obtain necessary fall risk management equipment  - Apply yellow socks and bracelet for high fall risk patients  - Consider moving patient to room near nurses station  Outcome: Progressing     Problem: DISCHARGE PLANNING  Goal: Discharge to home or other facility with appropriate resources  Description: INTERVENTIONS:  - Identify barriers to discharge w/patient and caregiver  - Arrange for needed discharge resources and transportation as appropriate  - Identify discharge learning needs (meds, wound care, etc )  - Arrange for interpretive services to assist at discharge as needed  - Refer to Case Management Department for coordinating discharge planning if the patient needs post-hospital services based on physician/advanced practitioner order or complex needs related to functional status, cognitive ability, or social support system  Outcome: Progressing     Problem: Knowledge Deficit  Goal: Patient/family/caregiver demonstrates understanding of disease process, treatment plan, medications, and discharge instructions  Description: Complete learning assessment and assess knowledge base  Interventions:  - Provide teaching at level of understanding  - Provide teaching via preferred learning methods  Outcome: Progressing     Problem: Prexisting or High Potential for Compromised Skin Integrity  Goal: Skin integrity is maintained or improved  Description: INTERVENTIONS:  - Identify patients at risk for skin breakdown  - Assess and monitor skin integrity  - Assess and monitor nutrition and hydration status  - Monitor labs   - Assess for incontinence   - Turn and reposition patient  - Assist with mobility/ambulation  - Relieve pressure over bony prominences  - Avoid friction and shearing  - Provide appropriate hygiene as needed including keeping skin clean and dry  - Evaluate need for skin moisturizer/barrier cream  - Collaborate with interdisciplinary team   - Patient/family teaching  - Consider wound care consult   Outcome: Progressing     Problem: Nutrition/Hydration-ADULT  Goal: Nutrient/Hydration intake appropriate for improving, restoring or maintaining nutritional needs  Description: Monitor and assess patient's nutrition/hydration status for malnutrition  Collaborate with interdisciplinary team and initiate plan and interventions as ordered  Monitor patient's weight and dietary intake as ordered or per policy  Utilize nutrition screening tool and intervene as necessary  Determine patient's food preferences and provide high-protein, high-caloric foods as appropriate       INTERVENTIONS:  - Monitor oral intake, urinary output, labs, and treatment plans  - Assess nutrition and hydration status and recommend course of action  - Evaluate amount of meals eaten  - Assist patient with eating if necessary   - Allow adequate time for meals  - Recommend/ encourage appropriate diets, oral nutritional supplements, and vitamin/mineral supplements  - Order, calculate, and assess calorie counts as needed  - Recommend, monitor, and adjust tube feedings and TPN/PPN based on assessed needs  - Assess need for intravenous fluids  - Provide specific nutrition/hydration education as appropriate  - Include patient/family/caregiver in decisions related to nutrition  Outcome: Progressing

## 2023-03-26 NOTE — PROGRESS NOTES
[place]  Progress Note  Name: Santiago Forman  MRN: [id number]  Unit/Bed#: -01 I Date of Admission: 3/23/2023   Date of Service: 3/26/2023 I Hospital Day: 2    Assessment/Plan   * Pyelonephritis  Assessment & Plan  · Utilizes external condom catheter - avoiding use in hospital secondary to wound on bottom of penis  · UC - gm negative rods  · BC - no growth to date  · CT with evidence of severe cystitis  · Currently on cefepime and vancomycin per ID - changed to ceftriaxone 3/26  · Place on urinary retention protocol     Sepsis without acute organ dysfunction (HCC)  Assessment & Plan  · SIRS criteria met on admission: Tachycardia and leukocytosis  · Suspected source pyelonephritis  · No severe sepsis criteria met  · Continue ceftriaxone - ID following  · Blood cultures x2 negative to date    Hyponatremia  Assessment & Plan  · Sodium at 130 on admission, now 136  Suspect in the setting of volume depletion  · Baseline sodium ranges between 132-137  · Given 1 L normal saline bolus in the ER  · Hold further IV fluids for now    Sacral wound, subsequent encounter  Assessment & Plan  · 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  · Reports increasing size and drainage over the last 1 week  · Surgery consult appreciated  · Continue local wound care  · No need for debridement at this point  Paraplegia [facility])  Assessment & Plan  · Status post work injury  · Fairly independent with self-reported wheelchair and handicap vehicle  · Unable to care for sacral wounds himself at home, looking for placement until wounds are healed    Patient is currently homeless and lives in his car  · CM consulted    S/P BKA (below knee amputation), left [facility])  Assessment & Plan  · Performed at [facility] 3/14/2022 by Dr Heather Dias for definitive management of decubitus ulcer  · Presents with ulcer to RLE  · Podiatry consulted for lower extremity wounds    Status post colostomy [facility])  Assessment & Plan  · Patient reports normal output of colostomy -unable to visualize stoma on admission as patient refuses ostomy bag change    Personal history of venous thrombosis and embolism  Assessment & Plan  · Status post IVC filter    Severe protein-calorie malnutrition ([address] )  Assessment & Plan  Malnutrition Findings:   Adult Malnutrition type: Chronic illness  Adult Degree of Malnutrition: Other severe protein calorie malnutrition                 Nutrition consulted         BMI Findings:  Adult BMI Classifications: Underweight < 18 5        Body mass index is 13 33 kg/m²  VTE Pharmacologic Prophylaxis: VTE Score: 5 High Risk (Score >/= 5) - Pharmacological DVT Prophylaxis Ordered: enoxaparin (Lovenox)  Sequential Compression Devices Ordered  Patient Centered Rounds: I performed bedside rounds with nursing staff today  Discussions with Specialists or Other Care Team Provider: case management    Education and Discussions with Family / Patient: Patient declined call to   Total Time Spent on Date of Encounter in care of patient: 35 minutes This time was spent on one or more of the following: performing physical exam; counseling and coordination of care; obtaining or reviewing history; documenting in the medical record; reviewing/ordering tests, medications or procedures; communicating with other healthcare professionals and discussing with patient's family/caregivers  Current Length of Stay: 2 day(s)  Current Patient Status: Inpatient   Certification Statement: The patient will continue to require additional inpatient hospital stay due to IV antibiotics  Discharge Plan: Anticipate discharge in 24-48 hrs to rehab facility  Code Status: Level 1 - Full Code    Subjective:   Offers no complaints  Overall feels well       Objective:     Vitals:   Temp (24hrs), Av 4 °F (36 9 °C), Min:98 °F (36 7 °C), Max:99 °F (37 2 °C)    Temp:  [98 °F (36 7 °C)-99 °F (37 2 °C)] 98 °F (36 7 °C)  HR:  [71-85] 74  Resp:  [16] 16  BP: ()/(55-99) 99/56  SpO2:  [97 %-100 %] 97 %  Body mass index is 13 33 kg/m²  Input and Output Summary (last 24 hours): Intake/Output Summary (Last 24 hours) at 3/26/2023 1113  Last data filed at 3/26/2023 0017  Gross per 24 hour   Intake 210 ml   Output 800 ml   Net -590 ml       Physical Exam:   Physical Exam  Vitals and nursing note reviewed  Constitutional:       General: He is not in acute distress  Appearance: He is well-developed  HENT:      Head: Normocephalic and atraumatic  Eyes:      Conjunctiva/sclera: Conjunctivae normal    Cardiovascular:      Rate and Rhythm: Normal rate and regular rhythm  Heart sounds: No murmur heard  Pulmonary:      Effort: Pulmonary effort is normal  No respiratory distress  Breath sounds: Normal breath sounds  Abdominal:      Palpations: Abdomen is soft  Tenderness: There is no abdominal tenderness  Musculoskeletal:         General: No swelling  Cervical back: Neck supple  Skin:     General: Skin is warm and dry  Capillary Refill: Capillary refill takes less than 2 seconds  Comments: decubitus wounds   Neurological:      Mental Status: He is alert     Psychiatric:         Mood and Affect: Mood normal           Additional Data:     Labs:  Results from last 7 days   Lab Units 03/26/23  0602   WBC Thousand/uL 6 84   HEMOGLOBIN g/dL 9 2*   HEMATOCRIT % 28 4*   PLATELETS Thousands/uL 728*   NEUTROS PCT % 64   LYMPHS PCT % 22   MONOS PCT % 9   EOS PCT % 5     Results from last 7 days   Lab Units 03/26/23  0602 03/24/23  0323 03/23/23  2118   SODIUM mmol/L 136   < > 130*   POTASSIUM mmol/L 4 2   < > 3 7   CHLORIDE mmol/L 106   < > 98   CO2 mmol/L 25   < > 23   BUN mg/dL 6   < > 6   CREATININE mg/dL 0 42*   < > 0 51*   ANION GAP mmol/L 5   < > 9   CALCIUM mg/dL 8 0*   < > 8 4   ALBUMIN g/dL  --   --  3 1*   TOTAL BILIRUBIN mg/dL --   --  0 29   ALK PHOS U/L  --   --  99   ALT U/L  --   --  5*   AST U/L  --   --  8*   GLUCOSE RANDOM mg/dL 86   < > 120    < > = values in this interval not displayed  Results from last 7 days   Lab Units 03/23/23  2118   INR  1 04             Results from last 7 days   Lab Units 03/25/23  0518 03/24/23  0323 03/23/23 2118   LACTIC ACID mmol/L  --   --  1 0   PROCALCITONIN ng/ml 17 87* 43 37* 50 61*       Lines/Drains:  Invasive Devices     Peripheral Intravenous Line  Duration           Peripheral IV 03/23/23 Dorsal (posterior); Left Hand 2 days          Drain  Duration           Ileostomy LLQ -- days    External Urinary Catheter Medium <1 day                      Imaging: No pertinent imaging reviewed  Recent Cultures (last 7 days):   Results from last 7 days   Lab Units 03/23/23  2218 03/23/23  2217 03/23/23 2120   BLOOD CULTURE   --  No Growth at 48 hrs  No Growth at 48 hrs     GRAM STAIN RESULT   --  No polys seen*  1+ Gram positive cocci in pairs*  --    URINE CULTURE  >100,000 cfu/ml Providencia rettgeri*  --   --    WOUND CULTURE   --  1+ Growth of Oxidase Positive gram negative rashad*  1+ Growth of Staphylococcus aureus*  4+ Growth of  --        Last 24 Hours Medication List:   Current Facility-Administered Medications   Medication Dose Route Frequency Provider Last Rate   • acetaminophen  650 mg Oral Q6H PRN Ryan Saleh PA-C     • aluminum-magnesium hydroxide-simethicone  30 mL Oral Q6H PRN Ryan Saleh PA-C     • cefTRIAXone  1,000 mg Intravenous Q24H Radha Steven MD 1,000 mg (03/26/23 1046)   • collagenase   Topical Daily Nerymonico Fleming PA-C     • enoxaparin  30 mg Subcutaneous Daily Ryan Saleh PA-C     • HYDROmorphone  0 5 mg Intravenous Q3H PRN Francisco Rojas MD     • nicotine  1 patch Transdermal Daily PRN Ryan Saleh PA-C     • ondansetron  4 mg Intravenous Q4H PRN Francisco Rojas MD     • oxyCODONE  2 5 mg Oral Q4H PRN Francisco Rojas MD      Or   • oxyCODONE  5 mg Oral Q4H PRN Corrine Garcia MD     • senna  1 tablet Oral HS PRN Cheryl Guerin PA-C          Today, Patient Was Seen By: Marietta Gay PA-C    **Please Note: This note may have been constructed using a voice recognition system  **

## 2023-03-26 NOTE — ASSESSMENT & PLAN NOTE
· Performed at [facility] 3/14/2022 by Dr Margo Mujica for definitive management of decubitus ulcer  · Presents with ulcer to RLE  · Podiatry consulted for lower extremity wounds

## 2023-03-26 NOTE — ASSESSMENT & PLAN NOTE
· Sodium at 130 on admission, now 136   Suspect in the setting of volume depletion  · Baseline sodium ranges between 132-137  · Given 1 L normal saline bolus in the ER  · Hold further IV fluids for now

## 2023-03-26 NOTE — ASSESSMENT & PLAN NOTE
· SIRS criteria met on admission: Tachycardia and leukocytosis  · Suspected source pyelonephritis  · No severe sepsis criteria met  · Continue ceftriaxone - ID following  · Blood cultures x2 negative to date

## 2023-03-26 NOTE — ASSESSMENT & PLAN NOTE
Malnutrition Findings:   Adult Malnutrition type: Chronic illness  Adult Degree of Malnutrition: Other severe protein calorie malnutrition                 Nutrition consulted         BMI Findings:  Adult BMI Classifications: Underweight < 18 5        Body mass index is 13 33 kg/m²  walks with a cane, limited physical activities due to hip and back pain

## 2023-03-26 NOTE — ASSESSMENT & PLAN NOTE
· Utilizes external condom catheter - avoiding use in hospital secondary to wound on bottom of penis  · UC - providencia rettgeri  · BC - no growth to date  · CT with evidence of severe cystitis  · cefepime and vancomycin changed to ceftriaxone per ID  · urinary retention protocol

## 2023-03-26 NOTE — PLAN OF CARE
Problem: MOBILITY - ADULT  Goal: Maintain or return to baseline ADL function  Description: INTERVENTIONS:  -  Assess patient's ability to carry out ADLs; assess patient's baseline for ADL function and identify physical deficits which impact ability to perform ADLs (bathing, care of mouth/teeth, toileting, grooming, dressing, etc )  - Assess/evaluate cause of self-care deficits   - Assess range of motion  - Assess patient's mobility; develop plan if impaired  - Assess patient's need for assistive devices and provide as appropriate  - Encourage maximum independence but intervene and supervise when necessary  - Involve family in performance of ADLs  - Assess for home care needs following discharge   - Consider OT consult to assist with ADL evaluation and planning for discharge  - Provide patient education as appropriate  Outcome: Progressing  Goal: Maintains/Returns to pre admission functional level  Description: INTERVENTIONS:  - Perform BMAT or MOVE assessment daily    - Set and communicate daily mobility goal to care team and patient/family/caregiver  - Collaborate with rehabilitation services on mobility goals if consulted  - Perform Range of Motion  times a day  - Reposition patient every  hours    - Dangle patient  times a day  - Stand patient  times a day  - Ambulate patient  times a day  - Out of bed to chair  times a day   - Out of bed for meals  times a day  - Out of bed for toileting  - Record patient progress and toleration of activity level   Outcome: Progressing     Problem: PAIN - ADULT  Goal: Verbalizes/displays adequate comfort level or baseline comfort level  Description: Interventions:  - Encourage patient to monitor pain and request assistance  - Assess pain using appropriate pain scale  - Administer analgesics based on type and severity of pain and evaluate response  - Implement non-pharmacological measures as appropriate and evaluate response  - Consider cultural and social influences on pain and pain management  - Notify physician/advanced practitioner if interventions unsuccessful or patient reports new pain  Outcome: Progressing     Problem: INFECTION - ADULT  Goal: Absence or prevention of progression during hospitalization  Description: INTERVENTIONS:  - Assess and monitor for signs and symptoms of infection  - Monitor lab/diagnostic results  - Monitor all insertion sites, i e  indwelling lines, tubes, and drains  - Monitor endotracheal if appropriate and nasal secretions for changes in amount and color  - [place] appropriate cooling/warming therapies per order  - Administer medications as ordered  - Instruct and encourage patient and family to use good hand hygiene technique  - Identify and instruct in appropriate isolation precautions for identified infection/condition  Outcome: Progressing  Goal: Absence of fever/infection during neutropenic period  Description: INTERVENTIONS:  - Monitor WBC    Outcome: Progressing     Problem: SAFETY ADULT  Goal: Maintain or return to baseline ADL function  Description: INTERVENTIONS:  -  Assess patient's ability to carry out ADLs; assess patient's baseline for ADL function and identify physical deficits which impact ability to perform ADLs (bathing, care of mouth/teeth, toileting, grooming, dressing, etc )  - Assess/evaluate cause of self-care deficits   - Assess range of motion  - Assess patient's mobility; develop plan if impaired  - Assess patient's need for assistive devices and provide as appropriate  - Encourage maximum independence but intervene and supervise when necessary  - Involve family in performance of ADLs  - Assess for home care needs following discharge   - Consider OT consult to assist with ADL evaluation and planning for discharge  - Provide patient education as appropriate  Outcome: Progressing  Goal: Maintains/Returns to pre admission functional level  Description: INTERVENTIONS:  - Perform BMAT or MOVE assessment daily    - Set and communicate daily mobility goal to care team and patient/family/caregiver  - Collaborate with rehabilitation services on mobility goals if consulted  - Perform Range of Motion times a day  - Reposition patient every  hours    - Dangle patient  times a day  - Stand patient  times a day  - Ambulate patient  times a day  - Out of bed to chair  times a day   - Out of bed for meals  times a day  - Out of bed for toileting  - Record patient progress and toleration of activity level   Outcome: Progressing  Goal: Patient will remain free of falls  Description: INTERVENTIONS:  - Educate patient/family on patient safety including physical limitations  - Instruct patient to call for assistance with activity   - Consult OT/PT to assist with strengthening/mobility   - Keep Call bell within reach  - Keep bed low and locked with side rails adjusted as appropriate  - Keep care items and personal belongings within reach  - Initiate and maintain comfort rounds  - Make Fall Risk Sign visible to staff  - Offer Toileting every  Hours, in advance of need  - Initiate/Maintain alarm  - Obtain necessary fall risk management equipment:  - Apply yellow socks and bracelet for high fall risk patients  - Consider moving patient to room near nurses station  Outcome: Progressing     Problem: DISCHARGE PLANNING  Goal: Discharge to home or other facility with appropriate resources  Description: INTERVENTIONS:  - Identify barriers to discharge w/patient and caregiver  - Arrange for needed discharge resources and transportation as appropriate  - Identify discharge learning needs (meds, wound care, etc )  - Arrange for interpretive services to assist at discharge as needed  - Refer to Case Management Department for coordinating discharge planning if the patient needs post-hospital services based on physician/advanced practitioner order or complex needs related to functional status, cognitive ability, or social support system  Outcome: Progressing Problem: Knowledge Deficit  Goal: Patient/family/caregiver demonstrates understanding of disease process, treatment plan, medications, and discharge instructions  Description: Complete learning assessment and assess knowledge base  Interventions:  - Provide teaching at level of understanding  - Provide teaching via preferred learning methods  Outcome: Progressing     Problem: Prexisting or High Potential for Compromised Skin Integrity  Goal: Skin integrity is maintained or improved  Description: INTERVENTIONS:  - Identify patients at risk for skin breakdown  - Assess and monitor skin integrity  - Assess and monitor nutrition and hydration status  - Monitor labs   - Assess for incontinence   - Turn and reposition patient  - Assist with mobility/ambulation  - Relieve pressure over bony prominences  - Avoid friction and shearing  - Provide appropriate hygiene as needed including keeping skin clean and dry  - Evaluate need for skin moisturizer/barrier cream  - Collaborate with interdisciplinary team   - Patient/family teaching  - Consider wound care consult   Outcome: Progressing     Problem: Nutrition/Hydration-ADULT  Goal: Nutrient/Hydration intake appropriate for improving, restoring or maintaining nutritional needs  Description: Monitor and assess patient's nutrition/hydration status for malnutrition  Collaborate with interdisciplinary team and initiate plan and interventions as ordered  Monitor patient's weight and dietary intake as ordered or per policy  Utilize nutrition screening tool and intervene as necessary  Determine patient's food preferences and provide high-protein, high-caloric foods as appropriate       INTERVENTIONS:  - Monitor oral intake, urinary output, labs, and treatment plans  - Assess nutrition and hydration status and recommend course of action  - Evaluate amount of meals eaten  - Assist patient with eating if necessary   - Allow adequate time for meals  - Recommend/ encourage appropriate diets, oral nutritional supplements, and vitamin/mineral supplements  - Order, calculate, and assess calorie counts as needed  - Recommend, monitor, and adjust tube feedings and TPN/PPN based on assessed needs  - Assess need for intravenous fluids  - Provide specific nutrition/hydration education as appropriate  - Include patient/family/caregiver in decisions related to nutrition  Outcome: Progressing

## 2023-03-27 LAB
ANION GAP SERPL CALCULATED.3IONS-SCNC: 4 MMOL/L (ref 4–13)
BACTERIA WND AEROBE CULT: ABNORMAL
BASOPHILS # BLD AUTO: 0.03 THOUSANDS/ÂΜL (ref 0–0.1)
BASOPHILS NFR BLD AUTO: 0 % (ref 0–1)
BUN SERPL-MCNC: 7 MG/DL (ref 5–25)
CALCIUM SERPL-MCNC: 8 MG/DL (ref 8.4–10.2)
CHLORIDE SERPL-SCNC: 104 MMOL/L (ref 96–108)
CO2 SERPL-SCNC: 27 MMOL/L (ref 21–32)
CREAT SERPL-MCNC: 0.39 MG/DL (ref 0.6–1.3)
EOSINOPHIL # BLD AUTO: 0.32 THOUSAND/ÂΜL (ref 0–0.61)
EOSINOPHIL NFR BLD AUTO: 5 % (ref 0–6)
ERYTHROCYTE [DISTWIDTH] IN BLOOD BY AUTOMATED COUNT: 17.6 % (ref 11.6–15.1)
GFR SERPL CREATININE-BSD FRML MDRD: 139 ML/MIN/1.73SQ M
GLUCOSE SERPL-MCNC: 86 MG/DL (ref 65–140)
GRAM STN SPEC: ABNORMAL
GRAM STN SPEC: ABNORMAL
HCT VFR BLD AUTO: 28.2 % (ref 36.5–49.3)
HGB BLD-MCNC: 9.1 G/DL (ref 12–17)
IMM GRANULOCYTES # BLD AUTO: 0.03 THOUSAND/UL (ref 0–0.2)
IMM GRANULOCYTES NFR BLD AUTO: 0 % (ref 0–2)
LYMPHOCYTES # BLD AUTO: 1.7 THOUSANDS/ÂΜL (ref 0.6–4.47)
LYMPHOCYTES NFR BLD AUTO: 25 % (ref 14–44)
MCH RBC QN AUTO: 27.4 PG (ref 26.8–34.3)
MCHC RBC AUTO-ENTMCNC: 32.3 G/DL (ref 31.4–37.4)
MCV RBC AUTO: 85 FL (ref 82–98)
MONOCYTES # BLD AUTO: 0.6 THOUSAND/ÂΜL (ref 0.17–1.22)
MONOCYTES NFR BLD AUTO: 9 % (ref 4–12)
NEUTROPHILS # BLD AUTO: 4.01 THOUSANDS/ÂΜL (ref 1.85–7.62)
NEUTS SEG NFR BLD AUTO: 61 % (ref 43–75)
NRBC BLD AUTO-RTO: 0 /100 WBCS
PLATELET # BLD AUTO: 752 THOUSANDS/UL (ref 149–390)
PMV BLD AUTO: 8 FL (ref 8.9–12.7)
POTASSIUM SERPL-SCNC: 4.4 MMOL/L (ref 3.5–5.3)
RBC # BLD AUTO: 3.32 MILLION/UL (ref 3.88–5.62)
SODIUM SERPL-SCNC: 135 MMOL/L (ref 135–147)
WBC # BLD AUTO: 6.69 THOUSAND/UL (ref 4.31–10.16)

## 2023-03-27 RX ADMIN — HYDROMORPHONE HYDROCHLORIDE 0.5 MG: 1 INJECTION, SOLUTION INTRAMUSCULAR; INTRAVENOUS; SUBCUTANEOUS at 06:47

## 2023-03-27 RX ADMIN — ENOXAPARIN SODIUM 30 MG: 100 INJECTION SUBCUTANEOUS at 08:31

## 2023-03-27 RX ADMIN — OXYCODONE HYDROCHLORIDE 5 MG: 5 TABLET ORAL at 08:39

## 2023-03-27 RX ADMIN — OXYCODONE HYDROCHLORIDE 5 MG: 5 TABLET ORAL at 20:00

## 2023-03-27 RX ADMIN — HYDROMORPHONE HYDROCHLORIDE 0.5 MG: 1 INJECTION, SOLUTION INTRAMUSCULAR; INTRAVENOUS; SUBCUTANEOUS at 10:35

## 2023-03-27 RX ADMIN — HYDROMORPHONE HYDROCHLORIDE 0.5 MG: 1 INJECTION, SOLUTION INTRAMUSCULAR; INTRAVENOUS; SUBCUTANEOUS at 16:49

## 2023-03-27 RX ADMIN — HYDROMORPHONE HYDROCHLORIDE 0.5 MG: 1 INJECTION, SOLUTION INTRAMUSCULAR; INTRAVENOUS; SUBCUTANEOUS at 22:11

## 2023-03-27 RX ADMIN — COLLAGENASE SANTYL: 250 OINTMENT TOPICAL at 08:31

## 2023-03-27 RX ADMIN — OXYCODONE HYDROCHLORIDE 5 MG: 5 TABLET ORAL at 14:27

## 2023-03-27 RX ADMIN — CEFTRIAXONE 1000 MG: 1 INJECTION, SOLUTION INTRAVENOUS at 08:31

## 2023-03-27 RX ADMIN — OXYCODONE HYDROCHLORIDE 5 MG: 5 TABLET ORAL at 04:26

## 2023-03-27 NOTE — PLAN OF CARE
Problem: MOBILITY - ADULT  Goal: Maintain or return to baseline ADL function  Description: INTERVENTIONS:  -  Assess patient's ability to carry out ADLs; assess patient's baseline for ADL function and identify physical deficits which impact ability to perform ADLs (bathing, care of mouth/teeth, toileting, grooming, dressing, etc )  - Assess/evaluate cause of self-care deficits   - Assess range of motion  - Assess patient's mobility; develop plan if impaired  - Assess patient's need for assistive devices and provide as appropriate  - Encourage maximum independence but intervene and supervise when necessary  - Involve family in performance of ADLs  - Assess for home care needs following discharge   - Consider OT consult to assist with ADL evaluation and planning for discharge  - Provide patient education as appropriate  Outcome: Progressing  Goal: Maintains/Returns to pre admission functional level  Description: INTERVENTIONS:  - Perform BMAT or MOVE assessment daily    - Set and communicate daily mobility goal to care team and patient/family/caregiver  - Collaborate with rehabilitation services on mobility goals if consulted  - Perform Range of Motion 2 times a day  - Reposition patient every 2 hours    - Dangle patient 2 times a day  - Stand patient 2 times a day  - Ambulate patient 2 times a day  - Out of bed to chair 2 times a day   - Out of bed for meals 2 times a day  - Out of bed for toileting  - Record patient progress and toleration of activity level   Outcome: Progressing     Problem: PAIN - ADULT  Goal: Verbalizes/displays adequate comfort level or baseline comfort level  Description: Interventions:  - Encourage patient to monitor pain and request assistance  - Assess pain using appropriate pain scale  - Administer analgesics based on type and severity of pain and evaluate response  - Implement non-pharmacological measures as appropriate and evaluate response  - Consider cultural and social influences on pain and pain management  - Notify physician/advanced practitioner if interventions unsuccessful or patient reports new pain  Outcome: Progressing     Problem: INFECTION - ADULT  Goal: Absence or prevention of progression during hospitalization  Description: INTERVENTIONS:  - Assess and monitor for signs and symptoms of infection  - Monitor lab/diagnostic results  - Monitor all insertion sites, i e  indwelling lines, tubes, and drains  - Monitor endotracheal if appropriate and nasal secretions for changes in amount and color  - [place] appropriate cooling/warming therapies per order  - Administer medications as ordered  - Instruct and encourage patient and family to use good hand hygiene technique  - Identify and instruct in appropriate isolation precautions for identified infection/condition  Outcome: Progressing  Goal: Absence of fever/infection during neutropenic period  Description: INTERVENTIONS:  - Monitor WBC    Outcome: Progressing     Problem: SAFETY ADULT  Goal: Maintain or return to baseline ADL function  Description: INTERVENTIONS:  -  Assess patient's ability to carry out ADLs; assess patient's baseline for ADL function and identify physical deficits which impact ability to perform ADLs (bathing, care of mouth/teeth, toileting, grooming, dressing, etc )  - Assess/evaluate cause of self-care deficits   - Assess range of motion  - Assess patient's mobility; develop plan if impaired  - Assess patient's need for assistive devices and provide as appropriate  - Encourage maximum independence but intervene and supervise when necessary  - Involve family in performance of ADLs  - Assess for home care needs following discharge   - Consider OT consult to assist with ADL evaluation and planning for discharge  - Provide patient education as appropriate  Outcome: Progressing  Goal: Maintains/Returns to pre admission functional level  Description: INTERVENTIONS:  - Perform BMAT or MOVE assessment daily    - Set and communicate daily mobility goal to care team and patient/family/caregiver  - Collaborate with rehabilitation services on mobility goals if consulted  - Perform Range of Motion 2 times a day  - Reposition patient every 2 hours    - Dangle patient 2 times a day  - Stand patient 2 times a day  - Ambulate patient 2 times a day  - Out of bed to chair 2 times a day   - Out of bed for meals 2 times a day  - Out of bed for toileting  - Record patient progress and toleration of activity level   Outcome: Progressing  Goal: Patient will remain free of falls  Description: INTERVENTIONS:  - Educate patient/family on patient safety including physical limitations  - Instruct patient to call for assistance with activity   - Consult OT/PT to assist with strengthening/mobility   - Keep Call bell within reach  - Keep bed low and locked with side rails adjusted as appropriate  - Keep care items and personal belongings within reach  - Initiate and maintain comfort rounds  - Make Fall Risk Sign visible to staff  - Offer Toileting every 2 Hours, in advance of need  - Initiate/Maintain 2alarm  - Obtain necessary fall risk management equipment: 2  - Apply yellow socks and bracelet for high fall risk patients  - Consider moving patient to room near nurses station  Outcome: Progressing     Problem: DISCHARGE PLANNING  Goal: Discharge to home or other facility with appropriate resources  Description: INTERVENTIONS:  - Identify barriers to discharge w/patient and caregiver  - Arrange for needed discharge resources and transportation as appropriate  - Identify discharge learning needs (meds, wound care, etc )  - Arrange for interpretive services to assist at discharge as needed  - Refer to Case Management Department for coordinating discharge planning if the patient needs post-hospital services based on physician/advanced practitioner order or complex needs related to functional status, cognitive ability, or social support system  Outcome: Progressing     Problem: Knowledge Deficit  Goal: Patient/family/caregiver demonstrates understanding of disease process, treatment plan, medications, and discharge instructions  Description: Complete learning assessment and assess knowledge base  Interventions:  - Provide teaching at level of understanding  - Provide teaching via preferred learning methods  Outcome: Progressing     Problem: Prexisting or High Potential for Compromised Skin Integrity  Goal: Skin integrity is maintained or improved  Description: INTERVENTIONS:  - Identify patients at risk for skin breakdown  - Assess and monitor skin integrity  - Assess and monitor nutrition and hydration status  - Monitor labs   - Assess for incontinence   - Turn and reposition patient  - Assist with mobility/ambulation  - Relieve pressure over bony prominences  - Avoid friction and shearing  - Provide appropriate hygiene as needed including keeping skin clean and dry  - Evaluate need for skin moisturizer/barrier cream  - Collaborate with interdisciplinary team   - Patient/family teaching  - Consider wound care consult   Outcome: Progressing     Problem: Nutrition/Hydration-ADULT  Goal: Nutrient/Hydration intake appropriate for improving, restoring or maintaining nutritional needs  Description: Monitor and assess patient's nutrition/hydration status for malnutrition  Collaborate with interdisciplinary team and initiate plan and interventions as ordered  Monitor patient's weight and dietary intake as ordered or per policy  Utilize nutrition screening tool and intervene as necessary  Determine patient's food preferences and provide high-protein, high-caloric foods as appropriate       INTERVENTIONS:  - Monitor oral intake, urinary output, labs, and treatment plans  - Assess nutrition and hydration status and recommend course of action  - Evaluate amount of meals eaten  - Assist patient with eating if necessary   - Allow adequate time for meals  - Recommend/ encourage appropriate diets, oral nutritional supplements, and vitamin/mineral supplements  - Order, calculate, and assess calorie counts as needed  - Recommend, monitor, and adjust tube feedings and TPN/PPN based on assessed needs  - Assess need for intravenous fluids  - Provide specific nutrition/hydration education as appropriate  - Include patient/family/caregiver in decisions related to nutrition  Outcome: Progressing     Problem: GENITOURINARY - ADULT  Goal: Maintains or returns to baseline urinary function  Description: INTERVENTIONS:  - Assess urinary function  - Encourage oral fluids to ensure adequate hydration if ordered  - Administer IV fluids as ordered to ensure adequate hydration  - Administer ordered medications as needed  - Offer frequent toileting  - Follow urinary retention protocol if ordered  Outcome: Progressing  Goal: Absence of urinary retention  Description: INTERVENTIONS:  - Assess patient’s ability to void and empty bladder  - Monitor I/O  - Bladder scan as needed  - Discuss with physician/AP medications to alleviate retention as needed  - Discuss catheterization for long term situations as appropriate  Outcome: Progressing     Problem: METABOLIC, FLUID AND ELECTROLYTES - ADULT  Goal: Electrolytes maintained within normal limits  Description: INTERVENTIONS:  - Monitor labs and assess patient for signs and symptoms of electrolyte imbalances  - Administer electrolyte replacement as ordered  - Monitor response to electrolyte replacements, including repeat lab results as appropriate  - Instruct patient on fluid and nutrition as appropriate  Outcome: Progressing  Goal: Fluid balance maintained  Description: INTERVENTIONS:  - Monitor labs   - Monitor I/O and WT  - Instruct patient on fluid and nutrition as appropriate  - Assess for signs & symptoms of volume excess or deficit  Outcome: Progressing     Problem: SKIN/TISSUE INTEGRITY - ADULT  Goal: Skin Integrity remains intact(Skin Breakdown Prevention)  Description: Assess:  -Perform [place] assessment every 2  -Clean and moisturize skin every 2  -Inspect skin when repositioning, toileting, and assisting with ADLS  -Assess under medical devices such as 2 every 2  -Assess extremities for adequate circulation and sensation     Bed Management:  -Have minimal linens on bed & keep smooth, unwrinkled  -Change linens as needed when moist or perspiring  -Avoid sitting or lying in one position for more than 2 hours while in bed  -Keep HOB at 2degrees     Toileting:  -Offer bedside commode  -Assess for incontinence every 2  -Use incontinent care products after each incontinent episode such as 2    Activity:  -Mobilize patient 2 times a day  -Encourage activity and walks on unit  -Encourage or provide ROM exercises   -Turn and reposition patient every 2 Hours  -Use appropriate equipment to lift or move patient in bed  -Instruct/ Assist with weight shifting every 2 when out of bed in chair  -Consider limitation of chair time 2 hour intervals    Skin Care:  -Avoid use of baby powder, tape, friction and shearing, hot water or constrictive clothing  -Relieve pressure over bony prominences using 2  -Do not massage red bony areas    Next Steps:  -Teach patient strategies to minimize risks such as 2   -Consider consults to  interdisciplinary teams such as 2  Outcome: Progressing  Goal: Incision(s), wounds(s) or drain site(s) healing without S/S of infection  Description: INTERVENTIONS  - Assess and document dressing, incision, wound bed, drain sites and surrounding tissue  - Provide patient and family education  - Perform skin care/dressing changes every 2  Outcome: Progressing  Goal: Pressure injury heals and does not worsen  Description: Interventions:  - Implement low air loss mattress or specialty surface (Criteria met)  - Apply silicone foam dressing  - Instruct/assist with weight shifting every 2 minutes when in chair   - Limit chair time to 2 hour intervals  - Use special pressure reducing interventions such as 2 when in chair   - Apply fecal or urinary incontinence containment device   - Perform passive or active ROM every 2  - Turn and reposition patient & offload bony prominences every 2 hours   - Utilize friction reducing device or surface for transfers   - Consider consults to  interdisciplinary teams such as 2  - Use incontinent care products after each incontinent episode such as 2  - Consider nutrition services referral as needed  Outcome: Progressing     Problem: HEMATOLOGIC - ADULT  Goal: Maintains hematologic stability  Description: INTERVENTIONS  - Assess for signs and symptoms of bleeding or hemorrhage  - Monitor labs  - Administer supportive blood products/factors as ordered and appropriate  Outcome: Progressing     Problem: MUSCULOSKELETAL - ADULT  Goal: Maintain or return mobility to safest level of function  Description: INTERVENTIONS:  - Assess patient's ability to carry out ADLs; assess patient's baseline for ADL function and identify physical deficits which impact ability to perform ADLs (bathing, care of mouth/teeth, toileting, grooming, dressing, etc )  - Assess/evaluate cause of self-care deficits   - Assess range of motion  - Assess patient's mobility  - Assess patient's need for assistive devices and provide as appropriate  - Encourage maximum independence but intervene and supervise when necessary  - Involve family in performance of ADLs  - Assess for home care needs following discharge   - Consider OT consult to assist with ADL evaluation and planning for discharge  - Provide patient education as appropriate  Outcome: Progressing     Problem: Potential for Falls  Goal: Patient will remain free of falls  Description: INTERVENTIONS:  - Educate patient/family on patient safety including physical limitations  - Instruct patient to call for assistance with activity   - Consult OT/PT to assist with strengthening/mobility   - Keep Call bell within reach  - Keep bed low and locked with side rails adjusted as appropriate  - Keep care items and personal belongings within reach  - Initiate and maintain comfort rounds  - Make Fall Risk Sign visible to staff  - Offer Toileting every 2 Hours, in advance of need  - Initiate/Maintain 2alarm  - Obtain necessary fall risk management equipment: 2  - Apply yellow socks and bracelet for high fall risk patients  - Consider moving patient to room near nurses station  Outcome: Progressing

## 2023-03-27 NOTE — CASE MANAGEMENT
Case Management Progress Note    Patient name Georges Samayoa  Location /-94 MRN [id number]  : 1970 Date 3/27/2023       LOS (days): 3  Geometric Mean LOS (GMLOS) (days):   Days to GMLOS:        OBJECTIVE:     Current admission status: Inpatient  [facility]   [id number] [address] [place] [id number]  Phone: [phone] Fax: [phone]    Primary Care Provider: No primary care provider on file  Primary Insurance:   Secondary Insurance:     PROGRESS NOTE:    Cm spoke to pt about his medical stability for discharge  Cm discussed if he qualified for a SNF  He desires to go to a SNF  He stated he will stay with his mother at [address]  He is unsure if his mother can do the dressing changes if taught by home RN  He states he thinks his workman's comp will cover daily RN wound care  CM asked about going to a wound care center but he is currently unable to get in and out of his vehicle independently  Cm spoke to his  from [email or web address]  and at [phone]  She stated he had been at a facility but left AMA about 3 weeks ago  He states he will not go back there  Hi spoke to Maurilio with [facility] and he states it is unlikely that any [facility] facilities will be able to take this pt at this time  Referrals sent to additional places  Cm spoke again to his  with [employer] contracted through the worker comp company and she has reached out to his  to see if they will continue to cover a SNF  Cm will wait to hear in the morning

## 2023-03-27 NOTE — PROGRESS NOTES
[place]  Progress Note  Name: Romeo Gonzalez  MRN: [id number]  Unit/Bed#: -01 I Date of Admission: 3/23/2023   Date of Service: 3/27/2023 I Hospital Day: 3    Assessment/Plan   * Pyelonephritis  Assessment & Plan  · Utilizes external condom catheter - avoiding use in hospital secondary to wound on bottom of penis  · UC - providencia rettgeri  · BC - no growth to date  · CT with evidence of severe cystitis  · cefepime and vancomycin changed to ceftriaxone per ID  · urinary retention protocol     Sepsis without acute organ dysfunction (HCC)  Assessment & Plan  · SIRS criteria met on admission: Tachycardia and leukocytosis  · Suspected source pyelonephritis  · No severe sepsis criteria met  · Continue ceftriaxone - ID following  · Blood cultures x2 negative to date    Hyponatremia  Assessment & Plan  · Sodium at 130 on admission, now 136  Suspect in the setting of volume depletion  · Baseline sodium ranges between 132-137  · Given 1 L normal saline bolus in the ER  · Hold further IV fluids for now  · Resolved     Sacral wound, subsequent encounter  Assessment & Plan  · 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  · Reports increasing size and drainage over the last 1 week  · Surgery consult appreciated  · Continue local wound care  · No need for debridement at this point  Paraplegia [facility])  Assessment & Plan  · Status post work injury  · Fairly independent with self-reported wheelchair and handicap vehicle  · Unable to care for sacral wounds himself at home, looking for placement until wounds are healed    Patient is currently homeless and lives in his car  · CM consulted    S/P BKA (below knee amputation), left [facility])  Assessment & Plan  · Performed at [facility] 3[facility]14[facility]2022 by Dr Ciro Rosas for definitive management of decubitus ulcer  · Presents with ulcer to RLE  · Podiatry consulted for lower extremity wounds - local wound care    Status post colostomy [facility])  Assessment & Plan  · Patient reports normal output of colostomy -unable to visualize stoma on admission as patient refuses ostomy bag change    Personal history of venous thrombosis and embolism  Assessment & Plan  · Status post IVC filter    Severe protein-calorie malnutrition ([address] )  Assessment & Plan  Malnutrition Findings:   Adult Malnutrition type: Chronic illness  Adult Degree of Malnutrition: Other severe protein calorie malnutrition                 Nutrition consulted         BMI Findings:  Adult BMI Classifications: Underweight < 18 5        Body mass index is 14 05 kg/m²  VTE Pharmacologic Prophylaxis: VTE Score: 5 High Risk (Score >/= 5) - Pharmacological DVT Prophylaxis Ordered: enoxaparin (Lovenox)  Sequential Compression Devices Ordered  Patient Centered Rounds: I performed bedside rounds with nursing staff today  Discussions with Specialists or Other Care Team Provider: case management    Education and Discussions with Family / Patient: Patient declined call to   Total Time Spent on Date of Encounter in care of patient: 35 minutes This time was spent on one or more of the following: performing physical exam; counseling and coordination of care; obtaining or reviewing history; documenting in the medical record; reviewing/ordering tests, medications or procedures; communicating with other healthcare professionals and discussing with patient's family/caregivers  Current Length of Stay: 3 day(s)  Current Patient Status: Inpatient   Certification Statement: The patient will continue to require additional inpatient hospital stay due to discharge planning  Discharge Plan: Anticipate discharge in 24-48 hrs to discharge location pending case management options    Code Status: Level 1 - Full Code    Subjective:   Offers no complaints       Objective:     Vitals:   Temp (24hrs), Av 5 °F (36 9 °C), Min:98 4 °F (36 9 °C), Max:98 7 °F (37 1 °C)    Temp:  [98 4 °F (36 9 °C)-98 7 °F (37 1 °C)] 98 5 °F (36 9 °C)  HR:  [66-86] 66  Resp:  [16] 16  BP: ()/(54-69) 92/56  SpO2:  [97 %-98 %] 97 %  Body mass index is 14 05 kg/m²  Input and Output Summary (last 24 hours): Intake/Output Summary (Last 24 hours) at 3/27/2023 1135  Last data filed at 3/27/2023 0401  Gross per 24 hour   Intake 300 ml   Output 1875 ml   Net -1575 ml       Physical Exam:   Physical Exam  Vitals and nursing note reviewed  Constitutional:       General: He is not in acute distress  Appearance: He is well-developed  HENT:      Head: Normocephalic and atraumatic  Cardiovascular:      Rate and Rhythm: Normal rate and regular rhythm  Heart sounds: No murmur heard  Pulmonary:      Effort: Pulmonary effort is normal  No respiratory distress  Breath sounds: Normal breath sounds  Abdominal:      Palpations: Abdomen is soft  Tenderness: There is no abdominal tenderness  Musculoskeletal:         General: No swelling  Skin:     General: Skin is warm and dry  Comments: Wounds noted   Neurological:      Mental Status: He is alert     Psychiatric:         Mood and Affect: Mood normal           Additional Data:     Labs:  Results from last 7 days   Lab Units 03/27/23  0445   WBC Thousand/uL 6 69   HEMOGLOBIN g/dL 9 1*   HEMATOCRIT % 28 2*   PLATELETS Thousands/uL 752*   NEUTROS PCT % 61   LYMPHS PCT % 25   MONOS PCT % 9   EOS PCT % 5     Results from last 7 days   Lab Units 03/27/23  0445 03/24/23  0323 03/23/23  2118   SODIUM mmol/L 135   < > 130*   POTASSIUM mmol/L 4 4   < > 3 7   CHLORIDE mmol/L 104   < > 98   CO2 mmol/L 27   < > 23   BUN mg/dL 7   < > 6   CREATININE mg/dL 0 39*   < > 0 51*   ANION GAP mmol/L 4   < > 9   CALCIUM mg/dL 8 0*   < > 8 4   ALBUMIN g/dL  --   --  3 1*   TOTAL BILIRUBIN mg/dL  --   --  0 29   ALK PHOS U/L  --   --  99   ALT U/L  --   --  5*   AST U/L  --   --  8*   GLUCOSE RANDOM mg/dL 86   < > 120    < > = values in this interval not displayed  Results from last 7 days   Lab Units 03/23/23  2118   INR  1 04             Results from last 7 days   Lab Units 03/25/23  0518 03/24/23  0323 03/23/23 2118   LACTIC ACID mmol/L  --   --  1 0   PROCALCITONIN ng/ml 17 87* 43 37* 50 61*       Lines/Drains:  Invasive Devices     Peripheral Intravenous Line  Duration           Peripheral IV 03/23/23 Dorsal (posterior); Left Hand 3 days          Drain  Duration           Ileostomy LLQ -- days    External Urinary Catheter Medium 1 day                      Imaging: No pertinent imaging reviewed  Recent Cultures (last 7 days):   Results from last 7 days   Lab Units 03/23/23  2218 03/23/23 2217 03/23/23 2120   BLOOD CULTURE   --  No Growth at 72 hrs  No Growth at 72 hrs     GRAM STAIN RESULT   --  No polys seen*  1+ Gram positive cocci in pairs*  --    URINE CULTURE  >100,000 cfu/ml Providencia rettgeri*  --   --    WOUND CULTURE   --  1+ Growth of Pseudomonas aeruginosa*  1+ Growth of Methicillin Resistant Staphylococcus aureus*  4+ Growth of  --        Last 24 Hours Medication List:   Current Facility-Administered Medications   Medication Dose Route Frequency Provider Last Rate   • acetaminophen  650 mg Oral Q6H PRN Charly Lopez PA-C     • aluminum-magnesium hydroxide-simethicone  30 mL Oral Q6H PRN Charly Lopez PA-C     • cefTRIAXone  1,000 mg Intravenous Q24H Giovanni Wang MD 1,000 mg (03/27/23 0831)   • collagenase   Topical Daily Nery Fleming PA-C     • enoxaparin  30 mg Subcutaneous Daily Charly Lopez PA-C     • HYDROmorphone  0 5 mg Intravenous Q3H PRN Berkley Castellano MD     • nicotine  1 patch Transdermal Daily PRN Charly Lopez PA-C     • ondansetron  4 mg Intravenous Q4H PRN Berkley Castellano MD     • oxyCODONE  2 5 mg Oral Q4H PRN Berkley Castellano MD      Or   • oxyCODONE  5 mg Oral Q4H PRN Berkley Castellano MD     • senna  1 tablet Oral HS PRN Charly Lopez PA-C Today, Patient Was Seen By: Marietta Gay PA-C    **Please Note: This note may have been constructed using a voice recognition system  **

## 2023-03-27 NOTE — PLAN OF CARE
Problem: MOBILITY - ADULT  Goal: Maintain or return to baseline ADL function  Description: INTERVENTIONS:  -  Assess patient's ability to carry out ADLs; assess patient's baseline for ADL function and identify physical deficits which impact ability to perform ADLs (bathing, care of mouth/teeth, toileting, grooming, dressing, etc )  - Assess/evaluate cause of self-care deficits   - Assess range of motion  - Assess patient's mobility; develop plan if impaired  - Assess patient's need for assistive devices and provide as appropriate  - Encourage maximum independence but intervene and supervise when necessary  - Involve family in performance of ADLs  - Assess for home care needs following discharge   - Consider OT consult to assist with ADL evaluation and planning for discharge  - Provide patient education as appropriate  Outcome: Progressing  Goal: Maintains/Returns to pre admission functional level  Description: INTERVENTIONS:  - Perform BMAT or MOVE assessment daily    - Set and communicate daily mobility goal to care team and patient/family/caregiver  - Collaborate with rehabilitation services on mobility goals if consulted  - Perform Range of Motion x times a day  - Reposition patient every x hours    - Dangle patient x times a day  - Stand patient x times a day  - Ambulate patient x times a day  - Out of bed to chair x times a day   - Out of bed for meals x times a day  - Out of bed for toileting  - Record patient progress and toleration of activity level   Outcome: Progressing     Problem: PAIN - ADULT  Goal: Verbalizes/displays adequate comfort level or baseline comfort level  Description: Interventions:  - Encourage patient to monitor pain and request assistance  - Assess pain using appropriate pain scale  - Administer analgesics based on type and severity of pain and evaluate response  - Implement non-pharmacological measures as appropriate and evaluate response  - Consider cultural and social influences on pain and pain management  - Notify physician/advanced practitioner if interventions unsuccessful or patient reports new pain  Outcome: Progressing     Problem: INFECTION - ADULT  Goal: Absence or prevention of progression during hospitalization  Description: INTERVENTIONS:  - Assess and monitor for signs and symptoms of infection  - Monitor lab/diagnostic results  - Monitor all insertion sites, i e  indwelling lines, tubes, and drains  - Monitor endotracheal if appropriate and nasal secretions for changes in amount and color  - [place] appropriate cooling/warming therapies per order  - Administer medications as ordered  - Instruct and encourage patient and family to use good hand hygiene technique  - Identify and instruct in appropriate isolation precautions for identified infection/condition  Outcome: Progressing  Goal: Absence of fever/infection during neutropenic period  Description: INTERVENTIONS:  - Monitor WBC    Outcome: Progressing     Problem: SAFETY ADULT  Goal: Maintain or return to baseline ADL function  Description: INTERVENTIONS:  -  Assess patient's ability to carry out ADLs; assess patient's baseline for ADL function and identify physical deficits which impact ability to perform ADLs (bathing, care of mouth/teeth, toileting, grooming, dressing, etc )  - Assess/evaluate cause of self-care deficits   - Assess range of motion  - Assess patient's mobility; develop plan if impaired  - Assess patient's need for assistive devices and provide as appropriate  - Encourage maximum independence but intervene and supervise when necessary  - Involve family in performance of ADLs  - Assess for home care needs following discharge   - Consider OT consult to assist with ADL evaluation and planning for discharge  - Provide patient education as appropriate  Outcome: Progressing  Goal: Maintains/Returns to pre admission functional level  Description: INTERVENTIONS:  - Perform BMAT or MOVE assessment daily    - Set and communicate daily mobility goal to care team and patient/family/caregiver  - Collaborate with rehabilitation services on mobility goals if consulted  - Perform Range of Motion x times a day  - Reposition patient every x hours    - Dangle patient x times a day  - Stand patient x times a day  - Ambulate patient x times a day  - Out of bed to chair x times a day   - Out of bed for meals x times a day  - Out of bed for toileting  - Record patient progress and toleration of activity level   Outcome: Progressing  Goal: Patient will remain free of falls  Description: INTERVENTIONS:  - Educate patient/family on patient safety including physical limitations  - Instruct patient to call for assistance with activity   - Consult OT/PT to assist with strengthening/mobility   - Keep Call bell within reach  - Keep bed low and locked with side rails adjusted as appropriate  - Keep care items and personal belongings within reach  - Initiate and maintain comfort rounds  - Make Fall Risk Sign visible to staff  - Offer Toileting every x Hours, in advance of need  - Initiate/Maintain xalarm  - Obtain necessary fall risk management equipment: x  - Apply yellow socks and bracelet for high fall risk patients  - Consider moving patient to room near nurses station  Outcome: Progressing     Problem: DISCHARGE PLANNING  Goal: Discharge to home or other facility with appropriate resources  Description: INTERVENTIONS:  - Identify barriers to discharge w/patient and caregiver  - Arrange for needed discharge resources and transportation as appropriate  - Identify discharge learning needs (meds, wound care, etc )  - Arrange for interpretive services to assist at discharge as needed  - Refer to Case Management Department for coordinating discharge planning if the patient needs post-hospital services based on physician/advanced practitioner order or complex needs related to functional status, cognitive ability, or social support system  Outcome: Progressing     Problem: Knowledge Deficit  Goal: Patient/family/caregiver demonstrates understanding of disease process, treatment plan, medications, and discharge instructions  Description: Complete learning assessment and assess knowledge base  Interventions:  - Provide teaching at level of understanding  - Provide teaching via preferred learning methods  Outcome: Progressing     Problem: Prexisting or High Potential for Compromised Skin Integrity  Goal: Skin integrity is maintained or improved  Description: INTERVENTIONS:  - Identify patients at risk for skin breakdown  - Assess and monitor skin integrity  - Assess and monitor nutrition and hydration status  - Monitor labs   - Assess for incontinence   - Turn and reposition patient  - Assist with mobility/ambulation  - Relieve pressure over bony prominences  - Avoid friction and shearing  - Provide appropriate hygiene as needed including keeping skin clean and dry  - Evaluate need for skin moisturizer/barrier cream  - Collaborate with interdisciplinary team   - Patient/family teaching  - Consider wound care consult   Outcome: Progressing     Problem: Nutrition/Hydration-ADULT  Goal: Nutrient/Hydration intake appropriate for improving, restoring or maintaining nutritional needs  Description: Monitor and assess patient's nutrition/hydration status for malnutrition  Collaborate with interdisciplinary team and initiate plan and interventions as ordered  Monitor patient's weight and dietary intake as ordered or per policy  Utilize nutrition screening tool and intervene as necessary  Determine patient's food preferences and provide high-protein, high-caloric foods as appropriate       INTERVENTIONS:  - Monitor oral intake, urinary output, labs, and treatment plans  - Assess nutrition and hydration status and recommend course of action  - Evaluate amount of meals eaten  - Assist patient with eating if necessary   - Allow adequate time for meals  - Recommend/ encourage appropriate diets, oral nutritional supplements, and vitamin/mineral supplements  - Order, calculate, and assess calorie counts as needed  - Recommend, monitor, and adjust tube feedings and TPN/PPN based on assessed needs  - Assess need for intravenous fluids  - Provide specific nutrition/hydration education as appropriate  - Include patient/family/caregiver in decisions related to nutrition  Outcome: Progressing     Problem: GENITOURINARY - ADULT  Goal: Maintains or returns to baseline urinary function  Description: INTERVENTIONS:  - Assess urinary function  - Encourage oral fluids to ensure adequate hydration if ordered  - Administer IV fluids as ordered to ensure adequate hydration  - Administer ordered medications as needed  - Offer frequent toileting  - Follow urinary retention protocol if ordered  Outcome: Progressing  Goal: Absence of urinary retention  Description: INTERVENTIONS:  - Assess patient’s ability to void and empty bladder  - Monitor I/O  - Bladder scan as needed  - Discuss with physician/AP medications to alleviate retention as needed  - Discuss catheterization for long term situations as appropriate  Outcome: Progressing     Problem: METABOLIC, FLUID AND ELECTROLYTES - ADULT  Goal: Electrolytes maintained within normal limits  Description: INTERVENTIONS:  - Monitor labs and assess patient for signs and symptoms of electrolyte imbalances  - Administer electrolyte replacement as ordered  - Monitor response to electrolyte replacements, including repeat lab results as appropriate  - Instruct patient on fluid and nutrition as appropriate  Outcome: Progressing  Goal: Fluid balance maintained  Description: INTERVENTIONS:  - Monitor labs   - Monitor I/O and WT  - Instruct patient on fluid and nutrition as appropriate  - Assess for signs & symptoms of volume excess or deficit  Outcome: Progressing     Problem: SKIN/TISSUE INTEGRITY - ADULT  Goal: Skin Integrity remains intact(Skin Breakdown Prevention)  Description: Assess:  -Perform Devaughn assessment every x  -Clean and moisturize skin every x  -Inspect skin when repositioning, toileting, and assisting with ADLS  -Assess under medical devices such as x every x  -Assess extremities for adequate circulation and sensation     Bed Management:  -Have minimal linens on bed & keep smooth, unwrinkled  -Change linens as needed when moist or perspiring  -Avoid sitting or lying in one position for more than x hours while in bed  -Keep [facility] at [facility]     Toileting:  -Offer bedside commode  -Assess for incontinence every x  -Use incontinent care products after each incontinent episode such as x    Activity:  -Mobilize patient x times a day  -Encourage activity and walks on unit  -Encourage or provide ROM exercises   -Turn and reposition patient every x Hours  -Use appropriate equipment to lift or move patient in bed  -Instruct/ Assist with weight shifting every x when out of bed in chair  -Consider limitation of chair time x hour intervals    Skin Care:  -Avoid use of baby powder, tape, friction and shearing, hot water or constrictive clothing  -Relieve pressure over bony prominences using x  -Do not massage red bony areas    Next Steps:  -Teach patient strategies to minimize risks such as x   -Consider consults to  interdisciplinary teams such as x  Outcome: Progressing  Goal: Incision(s), wounds(s) or drain site(s) healing without S/S of infection  Description: INTERVENTIONS  - Assess and document dressing, incision, wound bed, drain sites and surrounding tissue  - Provide patient and family education  - Perform skin care/dressing changes every x  Outcome: Progressing  Goal: Pressure injury heals and does not worsen  Description: Interventions:  - Implement low air loss mattress or specialty surface (Criteria met)  - Apply silicone foam dressing  - Instruct/assist with weight shifting every x minutes when in chair   - Limit chair time to x hour intervals  - Use special pressure reducing interventions such as x when in chair   - Apply fecal or urinary incontinence containment device   - Perform passive or active ROM every x  - Turn and reposition patient & offload bony prominences every x hours   - Utilize friction reducing device or surface for transfers   - Consider consults to  interdisciplinary teams such as x  - Use incontinent care products after each incontinent episode such as x  - Consider nutrition services referral as needed  Outcome: Progressing     Problem: HEMATOLOGIC - ADULT  Goal: Maintains hematologic stability  Description: INTERVENTIONS  - Assess for signs and symptoms of bleeding or hemorrhage  - Monitor labs  - Administer supportive blood products/factors as ordered and appropriate  Outcome: Progressing     Problem: MUSCULOSKELETAL - ADULT  Goal: Maintain or return mobility to safest level of function  Description: INTERVENTIONS:  - Assess patient's ability to carry out ADLs; assess patient's baseline for ADL function and identify physical deficits which impact ability to perform ADLs (bathing, care of mouth/teeth, toileting, grooming, dressing, etc )  - Assess/evaluate cause of self-care deficits   - Assess range of motion  - Assess patient's mobility  - Assess patient's need for assistive devices and provide as appropriate  - Encourage maximum independence but intervene and supervise when necessary  - Involve family in performance of ADLs  - Assess for home care needs following discharge   - Consider OT consult to assist with ADL evaluation and planning for discharge  - Provide patient education as appropriate  Outcome: Progressing     Problem: Potential for Falls  Goal: Patient will remain free of falls  Description: INTERVENTIONS:  - Educate patient/family on patient safety including physical limitations  - Instruct patient to call for assistance with activity   - Consult OT/PT to assist with strengthening/mobility   - Keep Call bell within reach  - Keep bed low and locked with side rails adjusted as appropriate  - Keep care items and personal belongings within reach  - Initiate and maintain comfort rounds  - Make Fall Risk Sign visible to staff  - Offer Toileting every x Hours, in advance of need  - Initiate/Maintain xalarm  - Obtain necessary fall risk management equipment: x  - Apply yellow socks and bracelet for high fall risk patients  - Consider moving patient to room near nurses station  Outcome: Progressing

## 2023-03-27 NOTE — ASSESSMENT & PLAN NOTE
· 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  · Reports increasing size and drainage over the last 1 week  · Surgery consult appreciated  · Continue local wound care  · No need for debridement at this point  [Regular Rate and Rhythm] : regular rate and rhythm [NL] : soft, non tender, non distended, normal bowel sounds, no hepatosplenomegaly [Skyler: ____] : Skyler [unfilled] [Bilateral Descended Testes] : bilateral descended testes [Patent] : patent [No Sacral Dimple] : no sacral dimple [NoTuft of Hair] : no tuft of hair [Supple] : supple [Normal S1, S2 audible] : normal S1, S2 audible [Warm, Well Perfused x4] : warm, well perfused x4 [Negative Ortalani/Greene] : negative Ortalani/Greene [FreeTextEntry1] : well-appearing [FreeTextEntry2] : +triangular facies, + flat open anterior fontanelle [FreeTextEntry3] : + low set ears [FreeTextEntry4] : + nasal congestion [de-identified] : palate intact [de-identified] : + mild intermittent suprasternal retractions [FreeTextEntry7] : + transmitted upper airway sounds [FreeTextEntry8] : + continuous machine-like murmur loudest at LUSB [de-identified] : + bilateral club feet [de-identified] : + hypotonia of extremities [de-identified] : + Kinyarwanda spots

## 2023-03-27 NOTE — ASSESSMENT & PLAN NOTE
· Performed at [facility] 3/14/2022 by Dr Zuleima Herndon for definitive management of decubitus ulcer  · Presents with ulcer to RLE  · Podiatry consulted for lower extremity wounds - local wound care

## 2023-03-27 NOTE — ASSESSMENT & PLAN NOTE
Malnutrition Findings:   Adult Malnutrition type: Chronic illness  Adult Degree of Malnutrition: Other severe protein calorie malnutrition                 Nutrition consulted         BMI Findings:  Adult BMI Classifications: Underweight < 18 5        Body mass index is 14 05 kg/m²

## 2023-03-27 NOTE — ASSESSMENT & PLAN NOTE
· Sodium at 130 on admission, now 136   Suspect in the setting of volume depletion  · Baseline sodium ranges between 132-137  · Given 1 L normal saline bolus in the ER  · Hold further IV fluids for now  · Resolved

## 2023-03-28 LAB
ANION GAP SERPL CALCULATED.3IONS-SCNC: 6 MMOL/L (ref 4–13)
BASOPHILS # BLD AUTO: 0.02 THOUSANDS/ÂΜL (ref 0–0.1)
BASOPHILS NFR BLD AUTO: 0 % (ref 0–1)
BUN SERPL-MCNC: 7 MG/DL (ref 5–25)
CALCIUM SERPL-MCNC: 8.1 MG/DL (ref 8.4–10.2)
CHLORIDE SERPL-SCNC: 104 MMOL/L (ref 96–108)
CO2 SERPL-SCNC: 25 MMOL/L (ref 21–32)
CREAT SERPL-MCNC: 0.43 MG/DL (ref 0.6–1.3)
EOSINOPHIL # BLD AUTO: 0.32 THOUSAND/ÂΜL (ref 0–0.61)
EOSINOPHIL NFR BLD AUTO: 5 % (ref 0–6)
ERYTHROCYTE [DISTWIDTH] IN BLOOD BY AUTOMATED COUNT: 18 % (ref 11.6–15.1)
GFR SERPL CREATININE-BSD FRML MDRD: 134 ML/MIN/1.73SQ M
GLUCOSE SERPL-MCNC: 84 MG/DL (ref 65–140)
HCT VFR BLD AUTO: 29 % (ref 36.5–49.3)
HGB BLD-MCNC: 9.2 G/DL (ref 12–17)
IMM GRANULOCYTES # BLD AUTO: 0.03 THOUSAND/UL (ref 0–0.2)
IMM GRANULOCYTES NFR BLD AUTO: 0 % (ref 0–2)
LYMPHOCYTES # BLD AUTO: 1.84 THOUSANDS/ÂΜL (ref 0.6–4.47)
LYMPHOCYTES NFR BLD AUTO: 26 % (ref 14–44)
MCH RBC QN AUTO: 27.2 PG (ref 26.8–34.3)
MCHC RBC AUTO-ENTMCNC: 31.7 G/DL (ref 31.4–37.4)
MCV RBC AUTO: 86 FL (ref 82–98)
MONOCYTES # BLD AUTO: 0.71 THOUSAND/ÂΜL (ref 0.17–1.22)
MONOCYTES NFR BLD AUTO: 10 % (ref 4–12)
NEUTROPHILS # BLD AUTO: 4.23 THOUSANDS/ÂΜL (ref 1.85–7.62)
NEUTS SEG NFR BLD AUTO: 59 % (ref 43–75)
NRBC BLD AUTO-RTO: 0 /100 WBCS
PLATELET # BLD AUTO: 693 THOUSANDS/UL (ref 149–390)
PMV BLD AUTO: 8 FL (ref 8.9–12.7)
POTASSIUM SERPL-SCNC: 4.6 MMOL/L (ref 3.5–5.3)
RBC # BLD AUTO: 3.38 MILLION/UL (ref 3.88–5.62)
SODIUM SERPL-SCNC: 135 MMOL/L (ref 135–147)
WBC # BLD AUTO: 7.15 THOUSAND/UL (ref 4.31–10.16)

## 2023-03-28 RX ORDER — CEFDINIR 300 MG/1
300 CAPSULE ORAL EVERY 12 HOURS SCHEDULED
Status: DISCONTINUED | OUTPATIENT
Start: 2023-03-28 | End: 2023-03-31 | Stop reason: HOSPADM

## 2023-03-28 RX ADMIN — CEFDINIR 300 MG: 300 CAPSULE ORAL at 10:09

## 2023-03-28 RX ADMIN — COLLAGENASE SANTYL: 250 OINTMENT TOPICAL at 12:38

## 2023-03-28 RX ADMIN — HYDROMORPHONE HYDROCHLORIDE 0.5 MG: 1 INJECTION, SOLUTION INTRAMUSCULAR; INTRAVENOUS; SUBCUTANEOUS at 21:02

## 2023-03-28 RX ADMIN — OXYCODONE HYDROCHLORIDE 5 MG: 5 TABLET ORAL at 18:49

## 2023-03-28 RX ADMIN — OXYCODONE HYDROCHLORIDE 5 MG: 5 TABLET ORAL at 12:21

## 2023-03-28 RX ADMIN — CEFDINIR 300 MG: 300 CAPSULE ORAL at 22:15

## 2023-03-28 RX ADMIN — OXYCODONE HYDROCHLORIDE 5 MG: 5 TABLET ORAL at 04:16

## 2023-03-28 RX ADMIN — HYDROMORPHONE HYDROCHLORIDE 0.5 MG: 1 INJECTION, SOLUTION INTRAMUSCULAR; INTRAVENOUS; SUBCUTANEOUS at 16:58

## 2023-03-28 RX ADMIN — HYDROMORPHONE HYDROCHLORIDE 0.5 MG: 1 INJECTION, SOLUTION INTRAMUSCULAR; INTRAVENOUS; SUBCUTANEOUS at 10:07

## 2023-03-28 RX ADMIN — ENOXAPARIN SODIUM 30 MG: 100 INJECTION SUBCUTANEOUS at 10:09

## 2023-03-28 RX ADMIN — HYDROMORPHONE HYDROCHLORIDE 0.5 MG: 1 INJECTION, SOLUTION INTRAMUSCULAR; INTRAVENOUS; SUBCUTANEOUS at 13:19

## 2023-03-28 NOTE — ASSESSMENT & PLAN NOTE
Malnutrition Findings:   Adult Malnutrition type: Chronic illness  Adult Degree of Malnutrition: Other severe protein calorie malnutrition                 Nutrition consulted         BMI Findings:  Adult BMI Classifications: Underweight < 18 5        Body mass index is 13 81 kg/m²

## 2023-03-28 NOTE — ASSESSMENT & PLAN NOTE
· Utilizes external condom catheter - avoiding use in hospital secondary to wound on bottom of penis  · UC - providencia rettgeri  · BC - no growth to date  · CT with evidence of severe cystitis  · cefepime and vancomycin changed to ceftriaxone per ID - now on oral cefdinir through 4/3  · urinary retention protocol

## 2023-03-28 NOTE — ASSESSMENT & PLAN NOTE
· SIRS criteria met on admission: Tachycardia and leukocytosis  · Suspected source pyelonephritis  · No severe sepsis criteria met  · Continue antibiotics  · Blood cultures x2 negative to date

## 2023-03-28 NOTE — ASSESSMENT & PLAN NOTE
· Status post work injury  · Fairly independent with self-reported wheelchair and handicap vehicle  · Unable to care for sacral wounds himself at home, looking for placement until wounds are healed  Patient is currently homeless and lives in his car   He signed out of SNF about 3 weeks ago  · CM consulted and working on placement

## 2023-03-28 NOTE — ASSESSMENT & PLAN NOTE
· Performed at [facility] 3/14/2022 by Dr James Castillo for definitive management of decubitus ulcer  · Presents with ulcer to RLE  · Podiatry consulted for lower extremity wounds - local wound care

## 2023-03-28 NOTE — PROGRESS NOTES
Luca Hairston  50 y o   male  1970  mrn [id number]    Assessment/Plan:    Paraplegia/leukocytosis/pyelonephritis/sacral decub/homelessness     Patient with paraplegia, multiple sacral wounds with exposed bone, came to ER with difficulty caring for himself as he is currently residing in his car  He had no fever on arrival, but had increased white blood cell count, a dirty [place] condom catheter, CTA and P, with expected osteomyelitis of the sacrum, left pyelonephritis, which I suspect is where the leukocytosis is coming from  Surgery has evaluated the wound, no plans for debridement  3/28: No fevers, no WBC  Superficial cultures of sacral wound with MRSA and pseudomonas, suspect they are just colonizers at this time, no WBC's on gram stain  No signs of active infection  Urine with providencia  Now on ceftriaxone  Okay to change to po cefdinir from my standpoint        -  okay to d/c Rocephin  -  start cefdinir 300 mg po bid for 7 more days   -  monitor for toxicities while on this medication   -  wound care, per wound care team and surgery  -  This is a significant social issue, he needs a place to reside so that he can have proper wound care     Discussed with primary team    ID to sign off, call with ?'s changes in clinical status  Subjective:  No fevers, no leukocytosis  Objective: Tc 98 2  Cor: rrr s1s2  Lungs: decreased  Abd: soft nt/nd + BS  Sacral wound is dressed         Labs:  CBC w/diff  Recent Labs     03/28/23  0408   WBC 7 15   HGB 9 2*   HCT 29 0*   *   NEUTOPHILPCT 59   LYMPHOPCT 26   MONOPCT 10   EOSPCT 5     BMP  Recent Labs     03/28/23  0408   K 4 6      CO2 25   BUN 7   CREATININE 0 43*   CALCIUM 8 1*     CMP  Recent Labs     03/28/23  0408   K 4 6      CO2 25   BUN 7   CREATININE 0 43*   CALCIUM 8 1*         labrc    Cultures:  Lab Results   Component Value Date    BLOODCX No Growth After 4 Days  03/23/2023    BLOODCX No Growth After 4 Days  03/23/2023     Lab Results   Component Value Date    WOUNDCULT 1+ Growth of Pseudomonas aeruginosa (A) 03/23/2023    WOUNDCULT (A) 03/23/2023     1+ Growth of Methicillin Resistant Staphylococcus aureus    WOUNDCULT 4+ Growth of 03/23/2023     Lab Results   Component Value Date    URINECX >100,000 cfu/ml Providencia rettgeri (A) 03/23/2023     No results found for: SPUTUMCULTUR    MED: reviewed       Current Facility-Administered Medications:   •  acetaminophen (TYLENOL) tablet 650 mg, 650 mg, Oral, Q6H PRN, Michela Gomes PA-C  •  aluminum-magnesium hydroxide-simethicone (MYLANTA) oral suspension 30 mL, 30 mL, Oral, Q6H PRN, Michela Gomes PA-C  •  cefTRIAXone (ROCEPHIN) IVPB (premix in dextrose) 1,000 mg 50 mL, 1,000 mg, Intravenous, Q24H, Bladimir Kamara MD, Last Rate: 100 mL/hr at 03/27/23 0831, 1,000 mg at 03/27/23 0831  •  collagenase (SANTYL) ointment, , Topical, Daily, Deisy Fleming PA-C, Given at 03/27/23 0831  •  enoxaparin (LOVENOX) subcutaneous injection 30 mg, 30 mg, Subcutaneous, Daily, Michela Gomes PA-C, 30 mg at 03/27/23 0725  •  HYDROmorphone (DILAUDID) injection 0 5 mg, 0 5 mg, Intravenous, Q3H PRN, Tamiko Boyer MD, 0 5 mg at 03/27/23 2211  •  nicotine (NICODERM CQ) 7 mg/24hr TD 24 hr patch 1 patch, 1 patch, Transdermal, Daily PRN, Michela Gomes PA-C  •  ondansetron [facility]) injection 4 mg, 4 mg, Intravenous, Q4H PRN, Tamiko Boyer MD  •  oxyCODONE (ROXICODONE) split tablet 2 5 mg, 2 5 mg, Oral, Q4H PRN **OR** oxyCODONE (ROXICODONE) IR tablet 5 mg, 5 mg, Oral, Q4H PRN, Tamiko Boyer MD, 5 mg at 03/28/23 0416  •  senna (SENOKOT) tablet 8 6 mg, 1 tablet, Oral, HS PRN, Michela Gomes PA-C    Principal Problem:    Pyelonephritis  Active Problems:    Status post colostomy [facility])    Personal history of venous thrombosis and embolism    Paraplegia (HCC)    Hyponatremia    Sepsis without acute organ dysfunction ([facility] [address] )    Sacral wound, subsequent encounter    S/P BKA (below knee amputation), left ([facility] [address] )    Severe protein-calorie malnutrition ([facility] )      Yvonne Givens MD

## 2023-03-28 NOTE — PLAN OF CARE
Problem: MOBILITY - ADULT  Goal: Maintain or return to baseline ADL function  Description: INTERVENTIONS:  -  Assess patient's ability to carry out ADLs; assess patient's baseline for ADL function and identify physical deficits which impact ability to perform ADLs (bathing, care of mouth/teeth, toileting, grooming, dressing, etc )  - Assess/evaluate cause of self-care deficits   - Assess range of motion  - Assess patient's mobility; develop plan if impaired  - Assess patient's need for assistive devices and provide as appropriate  - Encourage maximum independence but intervene and supervise when necessary  - Involve family in performance of ADLs  - Assess for home care needs following discharge   - Consider OT consult to assist with ADL evaluation and planning for discharge  - Provide patient education as appropriate  Outcome: Progressing  Goal: Maintains/Returns to pre admission functional level  Description: INTERVENTIONS:  - Perform BMAT or MOVE assessment daily    - Set and communicate daily mobility goal to care team and patient/family/caregiver     - Collaborate with rehabilitation services on mobility goals if consulted  - Perform Range of Motion self   - Reposition patient every self   - Dangle patient parapalegic   - Stand patient parapalegic   - Ambulate patient  parapalegic   - Out of bed to chair optional    - Out of bed for meals optional times a day  - Out of bed for toileting  - Record patient progress and toleration of activity level   Outcome: Progressing

## 2023-03-28 NOTE — CASE MANAGEMENT
Case Management Progress Note    Patient name Georges Samayoa  Location /-11 MRN [id number]  : 1970 Date 3/28/2023       LOS (days): 4  Geometric Mean LOS (GMLOS) (days):   Days to GMLOS:        OBJECTIVE:     Current admission status: Inpatient  Preferred Pharmacy:   [id number] [place], [address] [address]  Phone: [phone] Fax: [phone]    Primary Care Provider: No primary care provider on file  Primary Insurance:   Secondary Insurance:     PROGRESS NOTE:    CM received confirmation that pt's Worker's compensation will cover SNF as long as it is in [place]  Additional referrals sent out to [address] in [place]  Cm has not locating accepting facility yet

## 2023-03-28 NOTE — PROGRESS NOTES
[place]  Progress Note  Name: Toni Lira  MRN: [id number]  Unit/Bed#: -01 I Date of Admission: 3/23/2023   Date of Service: 3/28/2023 I Hospital Day: 4    Assessment/Plan   * Pyelonephritis  Assessment & Plan  · Utilizes external condom catheter - avoiding use in hospital secondary to wound on bottom of penis  · UC - providencia rettgeri  · BC - no growth to date  · CT with evidence of severe cystitis  · cefepime and vancomycin changed to ceftriaxone per ID - now on oral cefdinir through 4/3  · urinary retention protocol     Sepsis without acute organ dysfunction (HCC)  Assessment & Plan  · SIRS criteria met on admission: Tachycardia and leukocytosis  · Suspected source pyelonephritis  · No severe sepsis criteria met  · Continue antibiotics  · Blood cultures x2 negative to date    Hyponatremia  Assessment & Plan  · Sodium at 130 on admission, now 136  Suspect in the setting of volume depletion  · Baseline sodium ranges between 132-137  · Given 1 L normal saline bolus in the ER  · Hold further IV fluids for now  · Resolved     Sacral wound, subsequent encounter  Assessment & Plan  · 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  · Reports increasing size and drainage over the last 1 week  · Surgery consult appreciated  · Continue local wound care  · No need for debridement at this point  Paraplegia [facility])  Assessment & Plan  · Status post work injury  · Fairly independent with self-reported wheelchair and handicap vehicle  · Unable to care for sacral wounds himself at home, looking for placement until wounds are healed  Patient is currently homeless and lives in his car   He signed out of SNF about 3 weeks ago  · CM consulted and working on placement    S/P BKA (below knee amputation), left [facility])  Assessment & Plan  · Performed at [facility] 3/14/2022 by Dr Trever Rivera for definitive management of decubitus ulcer  · Presents with ulcer to RLE  · Podiatry consulted for lower extremity wounds - local wound care    Status post colostomy [facility])  Assessment & Plan  · Patient reports normal output of colostomy    Personal history of venous thrombosis and embolism  Assessment & Plan  · Status post IVC filter    Severe protein-calorie malnutrition ([facility] [address] )  Assessment & Plan  Malnutrition Findings:   Adult Malnutrition type: Chronic illness  Adult Degree of Malnutrition: Other severe protein calorie malnutrition                 Nutrition consulted         BMI Findings:  Adult BMI Classifications: Underweight < 18 5        Body mass index is 13 81 kg/m²  VTE Pharmacologic Prophylaxis: VTE Score: 5 High Risk (Score >/= 5) - Pharmacological DVT Prophylaxis Ordered: enoxaparin (Lovenox)  Sequential Compression Devices Ordered  Patient Centered Rounds: I performed bedside rounds with nursing staff today  Discussions with Specialists or Other Care Team Provider: case management    Education and Discussions with Family / Patient: Patient declined call to   Total Time Spent on Date of Encounter in care of patient: 35 minutes This time was spent on one or more of the following: performing physical exam; counseling and coordination of care; obtaining or reviewing history; documenting in the medical record; reviewing/ordering tests, medications or procedures; communicating with other healthcare professionals and discussing with patient's family/caregivers  Current Length of Stay: 4 day(s)  Current Patient Status: Inpatient   Certification Statement: The patient will continue to require additional inpatient hospital stay due to placement  Discharge Plan: Anticipate discharge in 24-48 hrs to rehab facility  Code Status: Level 1 - Full Code    Subjective:   Offers no complaints  Objective:     Vitals:   No data recorded  SpO2:  [99 %] 99 %  Body mass index is 13 81 kg/m²       Input and Output Summary (last 24 hours): Intake/Output Summary (Last 24 hours) at 3/28/2023 1153  Last data filed at 3/28/2023 0401  Gross per 24 hour   Intake --   Output 1900 ml   Net -1900 ml       Physical Exam:   Physical Exam  Vitals and nursing note reviewed  Constitutional:       General: He is not in acute distress  Appearance: He is well-developed  HENT:      Head: Normocephalic and atraumatic  Eyes:      Conjunctiva/sclera: Conjunctivae normal    Cardiovascular:      Rate and Rhythm: Normal rate and regular rhythm  Heart sounds: No murmur heard  Pulmonary:      Effort: Pulmonary effort is normal  No respiratory distress  Breath sounds: Normal breath sounds  Abdominal:      Palpations: Abdomen is soft  Tenderness: There is no abdominal tenderness  Musculoskeletal:         General: No swelling  Cervical back: Neck supple  Skin:     General: Skin is warm and dry  Capillary Refill: Capillary refill takes less than 2 seconds  Comments: Decub ulcers   Neurological:      Mental Status: He is alert  Psychiatric:         Mood and Affect: Mood normal          Additional Data:     Labs:  Results from last 7 days   Lab Units 03/28/23  0408   WBC Thousand/uL 7 15   HEMOGLOBIN g/dL 9 2*   HEMATOCRIT % 29 0*   PLATELETS Thousands/uL 693*   NEUTROS PCT % 59   LYMPHS PCT % 26   MONOS PCT % 10   EOS PCT % 5     Results from last 7 days   Lab Units 03/28/23  0408 03/24/23  0323 03/23/23  2118   SODIUM mmol/L 135   < > 130*   POTASSIUM mmol/L 4 6   < > 3 7   CHLORIDE mmol/L 104   < > 98   CO2 mmol/L 25   < > 23   BUN mg/dL 7   < > 6   CREATININE mg/dL 0 43*   < > 0 51*   ANION GAP mmol/L 6   < > 9   CALCIUM mg/dL 8 1*   < > 8 4   ALBUMIN g/dL  --   --  3 1*   TOTAL BILIRUBIN mg/dL  --   --  0 29   ALK PHOS U/L  --   --  99   ALT U/L  --   --  5*   AST U/L  --   --  8*   GLUCOSE RANDOM mg/dL 84   < > 120    < > = values in this interval not displayed       Results from last 7 days   Lab Units 03/23/23 2118   INR  1 04             Results from last 7 days   Lab Units 03/25/23  0518 03/24/23  0323 03/23/23 2118   LACTIC ACID mmol/L  --   --  1 0   PROCALCITONIN ng/ml 17 87* 43 37* 50 61*       Lines/Drains:  Invasive Devices     Peripheral Intravenous Line  Duration           Peripheral IV 03/27/23 Left;Proximal;Ventral (anterior) Forearm <1 day          Drain  Duration           Ileostomy LLQ -- days    External Urinary Catheter Medium 2 days                      Imaging: No pertinent imaging reviewed  Recent Cultures (last 7 days):   Results from last 7 days   Lab Units 03/23/23  2218 03/23/23 2217 03/23/23 2120   BLOOD CULTURE   --  No Growth After 4 Days  No Growth After 4 Days     GRAM STAIN RESULT   --  No polys seen*  1+ Gram positive cocci in pairs*  --    URINE CULTURE  >100,000 cfu/ml Providencia rettgeri*  --   --    WOUND CULTURE   --  1+ Growth of Pseudomonas aeruginosa*  1+ Growth of Methicillin Resistant Staphylococcus aureus*  4+ Growth of  --        Last 24 Hours Medication List:   Current Facility-Administered Medications   Medication Dose Route Frequency Provider Last Rate   • acetaminophen  650 mg Oral Q6H PRN Mk [place] PALázaroC     • aluminum-magnesium hydroxide-simethicone  30 mL Oral Q6H PRN KAUR DomingoC     • cefdinir  300 mg Oral Q12H [address] Tyree Hdz MD     • collagenase   Topical Daily Nery Fleming PA-C     • enoxaparin  30 mg Subcutaneous Daily KUAR DomingoC     • HYDROmorphone  0 5 mg Intravenous Q3H PRN Eliazar Bai MD     • nicotine  1 patch Transdermal Daily PRN Mk [place] PALázaroC     • ondansetron  4 mg Intravenous Q4H PRN Eliazar Bai MD     • oxyCODONE  2 5 mg Oral Q4H PRN Eliazar Bai MD      Or   • oxyCODONE  5 mg Oral Q4H PRN Eliazar Bai MD     • senna  1 tablet Oral HS PRN Mkmichelle [place], PA-C          Today, Patient Was Seen By: Blu Messer PA-C    **Please Note: This note may have been constructed using a voice recognition system  **

## 2023-03-29 LAB
BACTERIA BLD CULT: NORMAL
BACTERIA BLD CULT: NORMAL

## 2023-03-29 RX ADMIN — CEFDINIR 300 MG: 300 CAPSULE ORAL at 21:02

## 2023-03-29 RX ADMIN — HYDROMORPHONE HYDROCHLORIDE 0.5 MG: 1 INJECTION, SOLUTION INTRAMUSCULAR; INTRAVENOUS; SUBCUTANEOUS at 05:54

## 2023-03-29 RX ADMIN — OXYCODONE HYDROCHLORIDE 5 MG: 5 TABLET ORAL at 19:59

## 2023-03-29 RX ADMIN — ENOXAPARIN SODIUM 30 MG: 100 INJECTION SUBCUTANEOUS at 09:47

## 2023-03-29 RX ADMIN — HYDROMORPHONE HYDROCHLORIDE 0.5 MG: 1 INJECTION, SOLUTION INTRAMUSCULAR; INTRAVENOUS; SUBCUTANEOUS at 22:20

## 2023-03-29 RX ADMIN — COLLAGENASE SANTYL: 250 OINTMENT TOPICAL at 10:34

## 2023-03-29 RX ADMIN — OXYCODONE HYDROCHLORIDE 5 MG: 5 TABLET ORAL at 10:37

## 2023-03-29 RX ADMIN — HYDROMORPHONE HYDROCHLORIDE 0.5 MG: 1 INJECTION, SOLUTION INTRAMUSCULAR; INTRAVENOUS; SUBCUTANEOUS at 13:01

## 2023-03-29 RX ADMIN — HYDROMORPHONE HYDROCHLORIDE 0.5 MG: 1 INJECTION, SOLUTION INTRAMUSCULAR; INTRAVENOUS; SUBCUTANEOUS at 16:27

## 2023-03-29 RX ADMIN — HYDROMORPHONE HYDROCHLORIDE 0.5 MG: 1 INJECTION, SOLUTION INTRAMUSCULAR; INTRAVENOUS; SUBCUTANEOUS at 09:51

## 2023-03-29 RX ADMIN — HYDROMORPHONE HYDROCHLORIDE 0.5 MG: 1 INJECTION, SOLUTION INTRAMUSCULAR; INTRAVENOUS; SUBCUTANEOUS at 00:24

## 2023-03-29 RX ADMIN — CEFDINIR 300 MG: 300 CAPSULE ORAL at 09:47

## 2023-03-29 NOTE — CASE MANAGEMENT
Case Management Progress Note    Patient name Leticia Rich  Location /-23 MRN [id number]  : 1970 Date 3/29/2023       LOS (days): 5  Geometric Mean LOS (GMLOS) (days):   Days to GMLOS:        OBJECTIVE:     Current admission status: Inpatient  Preferred Pharmacy:   [address] [place] [address] [id number] [address]  Phone: [phone] Fax: [phone]    Primary Care Provider: No primary care provider on file  Primary Insurance:   Secondary Insurance:     PROGRESS NOTE:    [facility] is reviewing this patient's clinicals for medical acceptance to SNF  There business office is requesting a letter detailing what is covered, what is not covered where to send bills, and a daily contracted rate from the worker's compensation company  Cm called and reviewed this with Mechelle Griffin  with [facility] [employer] at [phone]  A detailed list of what needs to be included on letterhead with a physical signature to be given to SNFs was sent to [email or web address]  Cm will await letter to give to potential accepting facilities  [facility] is the only facility actively reviewing patient  Referrals were sent out in a 76 mile radius from pt's P O  mailing address

## 2023-03-29 NOTE — ASSESSMENT & PLAN NOTE
· Performed at [facility] 3/14/2022 by Dr James Castillo for definitive management of decubitus ulcer  · Presents with ulcer to RLE  · Podiatry consulted for lower extremity wounds - local wound care

## 2023-03-29 NOTE — PLAN OF CARE
Problem: MOBILITY - ADULT  Goal: Maintain or return to baseline ADL function  Description: INTERVENTIONS:  -  Assess patient's ability to carry out ADLs; assess patient's baseline for ADL function and identify physical deficits which impact ability to perform ADLs (bathing, care of mouth/teeth, toileting, grooming, dressing, etc )  - Assess/evaluate cause of self-care deficits   - Assess range of motion  - Assess patient's mobility; develop plan if impaired  - Assess patient's need for assistive devices and provide as appropriate  - Encourage maximum independence but intervene and supervise when necessary  - Involve family in performance of ADLs  - Assess for home care needs following discharge   - Consider OT consult to assist with ADL evaluation and planning for discharge  - Provide patient education as appropriate  3/29/2023 0044 by Mala Tinoco RN  Outcome: Progressing  3/29/2023 0044 by Mala Tinoco RN  Outcome: Progressing  Goal: Maintains/Returns to pre admission functional level  Description: INTERVENTIONS:  - Perform BMAT or MOVE assessment daily    - Set and communicate daily mobility goal to care team and patient/family/caregiver  - Collaborate with rehabilitation services on mobility goals if consulted  - Perform Range of Motion    times a day  - Reposition patient every    hours    - Dangle patient    times a day  - Stand patient    times a day  - Ambulate patient    times a day  - Out of bed to chair    times a day   - Out of bed for meals    times a day  - Out of bed for toileting  - Record patient progress and toleration of activity level   3/29/2023 0044 by Mala Tinoco RN  Outcome: Progressing  3/29/2023 0044 by Mala Tinoco RN  Outcome: Progressing     Problem: PAIN - ADULT  Goal: Verbalizes/displays adequate comfort level or baseline comfort level  Description: Interventions:  - Encourage patient to monitor pain and request assistance  - Assess pain using appropriate pain scale  - Administer analgesics based on type and severity of pain and evaluate response  - Implement non-pharmacological measures as appropriate and evaluate response  - Consider cultural and social influences on pain and pain management  - Notify physician/advanced practitioner if interventions unsuccessful or patient reports new pain  3/29/2023 0044 by Dahlia Callejas RN  Outcome: Progressing  3/29/2023 0044 by Dahlia Callejas RN  Outcome: Progressing     Problem: INFECTION - ADULT  Goal: Absence or prevention of progression during hospitalization  Description: INTERVENTIONS:  - Assess and monitor for signs and symptoms of infection  - Monitor lab/diagnostic results  - Monitor all insertion sites, i e  indwelling lines, tubes, and drains  - Monitor endotracheal if appropriate and nasal secretions for changes in amount and color  - [place] appropriate cooling/warming therapies per order  - Administer medications as ordered  - Instruct and encourage patient and family to use good hand hygiene technique  - Identify and instruct in appropriate isolation precautions for identified infection/condition  3/29/2023 0044 by Dahlia Callejas RN  Outcome: Progressing  3/29/2023 0044 by Dahlia Callejas RN  Outcome: Progressing  Goal: Absence of fever/infection during neutropenic period  Description: INTERVENTIONS:  - Monitor WBC    3/29/2023 0044 by Dahlia Callejas RN  Outcome: Progressing  3/29/2023 0044 by Dahlia Callejas RN  Outcome: Progressing     Problem: SAFETY ADULT  Goal: Maintain or return to baseline ADL function  Description: INTERVENTIONS:  -  Assess patient's ability to carry out ADLs; assess patient's baseline for ADL function and identify physical deficits which impact ability to perform ADLs (bathing, care of mouth/teeth, toileting, grooming, dressing, etc )  - Assess/evaluate cause of self-care deficits   - Assess range of motion  - Assess patient's mobility; develop plan if impaired  - Assess patient's need for assistive devices and provide as appropriate  - Encourage maximum independence but intervene and supervise when necessary  - Involve family in performance of ADLs  - Assess for home care needs following discharge   - Consider OT consult to assist with ADL evaluation and planning for discharge  - Provide patient education as appropriate  3/29/2023 0044 by Lois Shin RN  Outcome: Progressing  3/29/2023 0044 by Lois Shin RN  Outcome: Progressing  Goal: Maintains/Returns to pre admission functional level  Description: INTERVENTIONS:  - Perform BMAT or MOVE assessment daily    - Set and communicate daily mobility goal to care team and patient/family/caregiver  - Collaborate with rehabilitation services on mobility goals if consulted  - Perform Range of Motion    times a day  - Reposition patient every    hours    - Dangle patient    times a day  - Stand patient  times a day  - Ambulate patient    times a day  - Out of bed to chair    times a day   - Out of bed for meals    times a day  - Out of bed for toileting  - Record patient progress and toleration of activity level   3/29/2023 0044 by Lois Shin RN  Outcome: Progressing  3/29/2023 0044 by Lois Shin RN  Outcome: Progressing  Goal: Patient will remain free of falls  Description: INTERVENTIONS:  - Educate patient/family on patient safety including physical limitations  - Instruct patient to call for assistance with activity   - Consult OT/PT to assist with strengthening/mobility   - Keep Call bell within reach  - Keep bed low and locked with side rails adjusted as appropriate  - Keep care items and personal belongings within reach  - Initiate and maintain comfort rounds  - Make Fall Risk Sign visible to staff  - Offer Toileting every    Hours, in advance of need  - Initiate/Maintain   alarm  - Obtain necessary fall risk management equipment:     - Apply yellow socks and bracelet for high fall risk patients  - Consider moving patient to room near nurses station  3/29/2023 0044 by Jodee Mon RN  Outcome: Progressing  3/29/2023 0044 by Jodee Mon RN  Outcome: Progressing     Problem: DISCHARGE PLANNING  Goal: Discharge to home or other facility with appropriate resources  Description: INTERVENTIONS:  - Identify barriers to discharge w/patient and caregiver  - Arrange for needed discharge resources and transportation as appropriate  - Identify discharge learning needs (meds, wound care, etc )  - Arrange for interpretive services to assist at discharge as needed  - Refer to Case Management Department for coordinating discharge planning if the patient needs post-hospital services based on physician/advanced practitioner order or complex needs related to functional status, cognitive ability, or social support system  3/29/2023 0044 by Jodee Mon RN  Outcome: Progressing  3/29/2023 0044 by Jodee Mon RN  Outcome: Progressing     Problem: Knowledge Deficit  Goal: Patient/family/caregiver demonstrates understanding of disease process, treatment plan, medications, and discharge instructions  Description: Complete learning assessment and assess knowledge base    Interventions:  - Provide teaching at level of understanding  - Provide teaching via preferred learning methods  3/29/2023 0044 by Jodee Mon RN  Outcome: Progressing  3/29/2023 0044 by Jodee Mon RN  Outcome: Progressing     Problem: Prexisting or High Potential for Compromised Skin Integrity  Goal: Skin integrity is maintained or improved  Description: INTERVENTIONS:  - Identify patients at risk for skin breakdown  - Assess and monitor skin integrity  - Assess and monitor nutrition and hydration status  - Monitor labs   - Assess for incontinence   - Turn and reposition patient  - Assist with mobility/ambulation  - Relieve pressure over bony prominences  - Avoid friction and shearing  - Provide appropriate hygiene as needed including keeping skin clean and dry  - Evaluate need for skin moisturizer/barrier cream  - Collaborate with interdisciplinary team   - Patient/family teaching  - Consider wound care consult   3/29/2023 0044 by Lala Hi RN  Outcome: Progressing  3/29/2023 0044 by Lala Hi RN  Outcome: Progressing     Problem: Nutrition/Hydration-ADULT  Goal: Nutrient/Hydration intake appropriate for improving, restoring or maintaining nutritional needs  Description: Monitor and assess patient's nutrition/hydration status for malnutrition  Collaborate with interdisciplinary team and initiate plan and interventions as ordered  Monitor patient's weight and dietary intake as ordered or per policy  Utilize nutrition screening tool and intervene as necessary  Determine patient's food preferences and provide high-protein, high-caloric foods as appropriate       INTERVENTIONS:  - Monitor oral intake, urinary output, labs, and treatment plans  - Assess nutrition and hydration status and recommend course of action  - Evaluate amount of meals eaten  - Assist patient with eating if necessary   - Allow adequate time for meals  - Recommend/ encourage appropriate diets, oral nutritional supplements, and vitamin/mineral supplements  - Order, calculate, and assess calorie counts as needed  - Recommend, monitor, and adjust tube feedings and TPN/PPN based on assessed needs  - Assess need for intravenous fluids  - Provide specific nutrition/hydration education as appropriate  - Include patient/family/caregiver in decisions related to nutrition  3/29/2023 0044 by Lala Hi RN  Outcome: Progressing  3/29/2023 0044 by Lala Hi RN  Outcome: Progressing     Problem: GENITOURINARY - ADULT  Goal: Maintains or returns to baseline urinary function  Description: INTERVENTIONS:  - Assess urinary function  - Encourage oral fluids to ensure adequate hydration if ordered  - Administer IV fluids as ordered to ensure adequate hydration  - Administer ordered medications as needed  - Offer frequent toileting  - Follow urinary retention protocol if ordered  3/29/2023 0044 by Ronnell Frazier RN  Outcome: Progressing  3/29/2023 0044 by Ronnell Frazier RN  Outcome: Progressing  Goal: Absence of urinary retention  Description: INTERVENTIONS:  - Assess patient’s ability to void and empty bladder  - Monitor I/O  - Bladder scan as needed  - Discuss with physician/AP medications to alleviate retention as needed  - Discuss catheterization for long term situations as appropriate  3/29/2023 0044 by Ronnell Frazier RN  Outcome: Progressing  3/29/2023 0044 by Ronnell Frazier RN  Outcome: Progressing     Problem: METABOLIC, FLUID AND ELECTROLYTES - ADULT  Goal: Electrolytes maintained within normal limits  Description: INTERVENTIONS:  - Monitor labs and assess patient for signs and symptoms of electrolyte imbalances  - Administer electrolyte replacement as ordered  - Monitor response to electrolyte replacements, including repeat lab results as appropriate  - Instruct patient on fluid and nutrition as appropriate  3/29/2023 0044 by Ronnell Frazier RN  Outcome: Progressing  3/29/2023 0044 by Ronnell Frazier RN  Outcome: Progressing  Goal: Fluid balance maintained  Description: INTERVENTIONS:  - Monitor labs   - Monitor I/O and WT  - Instruct patient on fluid and nutrition as appropriate  - Assess for signs & symptoms of volume excess or deficit  3/29/2023 0044 by Ronnell Frazier RN  Outcome: Progressing  3/29/2023 0044 by Ronnell Frazier RN  Outcome: Progressing     Problem: SKIN/TISSUE INTEGRITY - ADULT  Goal: Skin Integrity remains intact(Skin Breakdown Prevention)  Description: Assess:  -Perform Devaughn assessment every     -Clean and moisturize skin every     -Inspect skin when repositioning, toileting, and assisting with ADLS  -Assess under medical devices such as    every     -Assess extremities for adequate circulation and sensation     Bed Management:  -Have minimal linens on bed & keep smooth, unwrinkled  -Change linens as needed when moist or perspiring  -Avoid sitting or lying in one position for more than    hours while in bed  -Keep HOB at   degrees     Toileting:  -Offer bedside commode  -Assess for incontinence every     -Use incontinent care products after each incontinent episode such as       Activity:  -Mobilize patient    times a day  -Encourage activity and walks on unit  -Encourage or provide ROM exercises   -Turn and reposition patient every    Hours  -Use appropriate equipment to lift or move patient in bed  -Instruct/ Assist with weight shifting every    when out of bed in chair  -Consider limitation of chair time    hour intervals    Skin Care:  -Avoid use of baby powder, tape, friction and shearing, hot water or constrictive clothing  -Relieve pressure over bony prominences using     -Do not massage red bony areas    Next Steps:  -Teach patient strategies to minimize risks such as      -Consider consults to  interdisciplinary teams such as     3/29/2023 0044 by Freeman Salas RN  Outcome: Progressing  3/29/2023 0044 by Freeman Salas RN  Outcome: Progressing  Goal: Incision(s), wounds(s) or drain site(s) healing without S/S of infection  Description: INTERVENTIONS  - Assess and document dressing, incision, wound bed, drain sites and surrounding tissue  - Provide patient and family education  - Perform skin care/dressing changes every     3/29/2023 0044 by Freeman Salas RN  Outcome: Progressing  3/29/2023 0044 by Freeman Salas RN  Outcome: Progressing  Goal: Pressure injury heals and does not worsen  Description: Interventions:  - Implement low air loss mattress or specialty surface (Criteria met)  - Apply silicone foam dressing  - Instruct/assist with weight shifting every    minutes when in chair   - Limit chair time to    hour intervals  - Use special pressure reducing interventions such as    when in chair   - Apply fecal or urinary incontinence containment device   - Perform passive or active ROM every     - Turn and reposition patient & offload bony prominences every    hours   - Utilize friction reducing device or surface for transfers   - Consider consults to  interdisciplinary teams such as     - Use incontinent care products after each incontinent episode such as     - Consider nutrition services referral as needed  3/29/2023 0044 by Von Kumari RN  Outcome: Progressing  3/29/2023 0044 by Von Kumari RN  Outcome: Progressing     Problem: HEMATOLOGIC - ADULT  Goal: Maintains hematologic stability  Description: INTERVENTIONS  - Assess for signs and symptoms of bleeding or hemorrhage  - Monitor labs  - Administer supportive blood products/factors as ordered and appropriate  3/29/2023 0044 by Von Kumari RN  Outcome: Progressing  3/29/2023 0044 by Von Kumari RN  Outcome: Progressing     Problem: MUSCULOSKELETAL - ADULT  Goal: Maintain or return mobility to safest level of function  Description: INTERVENTIONS:  - Assess patient's ability to carry out ADLs; assess patient's baseline for ADL function and identify physical deficits which impact ability to perform ADLs (bathing, care of mouth/teeth, toileting, grooming, dressing, etc )  - Assess/evaluate cause of self-care deficits   - Assess range of motion  - Assess patient's mobility  - Assess patient's need for assistive devices and provide as appropriate  - Encourage maximum independence but intervene and supervise when necessary  - Involve family in performance of ADLs  - Assess for home care needs following discharge   - Consider OT consult to assist with ADL evaluation and planning for discharge  - Provide patient education as appropriate  3/29/2023 0044 by Von Kumari RN  Outcome: Progressing  3/29/2023 0044 by Von Kumari RN  Outcome: Progressing     Problem: Potential for Falls  Goal: Patient will remain free of falls  Description: INTERVENTIONS:  - Educate patient/family on patient safety including physical limitations  - Instruct patient to call for assistance with activity   - Consult OT/PT to assist with strengthening/mobility   - Keep Call bell within reach  - Keep bed low and locked with side rails adjusted as appropriate  - Keep care items and personal belongings within reach  - Initiate and maintain comfort rounds  - Make Fall Risk Sign visible to staff  - Offer Toileting every    Hours, in advance of need  - Initiate/Maintain   alarm  - Obtain necessary fall risk management equipment:     - Apply yellow socks and bracelet for high fall risk patients  - Consider moving patient to room near nurses station  3/29/2023 0044 by Lois Shin RN  Outcome: Progressing  3/29/2023 0044 by Lois Shin, RN  Outcome: Progressing

## 2023-03-29 NOTE — ASSESSMENT & PLAN NOTE
· Status post work injury  · Fairly independent with self-reported wheelchair and handicap vehicle  · Unable to care for sacral wounds himself at home, looking for placement until wounds are healed  Patient is currently homeless and lives in his car   He signed out of SNF about 3 weeks ago  · CM consulted and working on placement - awaiting accepting facility

## 2023-03-29 NOTE — PROGRESS NOTES
[place]  Progress Note  Name: Romeo Gonzalez  MRN: [id number]  Unit/Bed#: -01 I Date of Admission: 3/23/2023   Date of Service: 3/29/2023 I Hospital Day: 5    Assessment/Plan   * Pyelonephritis  Assessment & Plan  · Utilizes external condom catheter - avoiding use in hospital secondary to wound on bottom of penis  · UC - providencia rettgeri  · BC - no growth to date  · CT with evidence of severe cystitis  · cefepime and vancomycin changed to ceftriaxone per ID - now on oral cefdinir through 4/3  · urinary retention protocol     Sepsis without acute organ dysfunction (HCC)  Assessment & Plan  · SIRS criteria met on admission: Tachycardia and leukocytosis  · Suspected source pyelonephritis  · No severe sepsis criteria met  · Continue antibiotics  · Blood cultures x2 negative to date    Hyponatremia  Assessment & Plan  · Sodium at 130 on admission, now 136  Suspect in the setting of volume depletion  · Baseline sodium ranges between 132-137  · Given 1 L normal saline bolus in the ER  · Hold further IV fluids for now  · Resolved     Sacral wound, subsequent encounter  Assessment & Plan  · 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  · Reports increasing size and drainage over the last 1 week  · Surgery consult appreciated  · Continue local wound care  · No need for debridement at this point  Paraplegia [facility])  Assessment & Plan  · Status post work injury  · Fairly independent with self-reported wheelchair and handicap vehicle  · Unable to care for sacral wounds himself at home, looking for placement until wounds are healed  Patient is currently homeless and lives in his car   He signed out of SNF about 3 weeks ago  · CM consulted and working on placement - awaiting accepting facility    S/P BKA (below knee amputation), left [facility])  Assessment & Plan  · Performed at [facility] 3/14/2022 by Dr Ciro Rosas for definitive management of decubitus ulcer  · Presents with ulcer to RLE  · Podiatry consulted for lower extremity wounds - local wound care    Status post colostomy [facility])  Assessment & Plan  · Patient reports normal output of colostomy    Personal history of venous thrombosis and embolism  Assessment & Plan  · Status post IVC filter    Severe protein-calorie malnutrition ([address] )  Assessment & Plan  Malnutrition Findings:   Adult Malnutrition type: Chronic illness  Adult Degree of Malnutrition: Other severe protein calorie malnutrition                 Nutrition consulted         BMI Findings:  Adult BMI Classifications: Underweight < 18 5        Body mass index is 13 95 kg/m²  VTE Pharmacologic Prophylaxis: VTE Score: 5 High Risk (Score >/= 5) - Pharmacological DVT Prophylaxis Ordered: enoxaparin (Lovenox)  Sequential Compression Devices Ordered  Patient Centered Rounds: I performed bedside rounds with nursing staff today  Discussions with Specialists or Other Care Team Provider: case management    Education and Discussions with Family / Patient: Patient declined call to   Total Time Spent on Date of Encounter in care of patient: 25 minutes This time was spent on one or more of the following: performing physical exam; counseling and coordination of care; obtaining or reviewing history; documenting in the medical record; reviewing/ordering tests, medications or procedures; communicating with other healthcare professionals and discussing with patient's family/caregivers  Current Length of Stay: 5 day(s)  Current Patient Status: Inpatient   Certification Statement: The patient will continue to require additional inpatient hospital stay due to pending placement  Discharge Plan: Anticipate discharge in 24-48 hrs to rehab facility  Code Status: Level 1 - Full Code    Subjective:   Offers no complaints       Objective:     Vitals:   Temp (24hrs), Av 3 °F (36 8 °C), Min:98 2 °F (36 8 °C), Max:98 4 °F (36 9 °C)    Temp:  [98 2 °F (36 8 °C)-98 4 °F (36 9 °C)] 98 4 °F (36 9 °C)  HR:  [65-82] 65  Resp:  [12-19] 12  BP: ()/(57-68) 121/68  SpO2:  [97 %-98 %] 98 %  Body mass index is 13 95 kg/m²  Input and Output Summary (last 24 hours): Intake/Output Summary (Last 24 hours) at 3/29/2023 1228  Last data filed at 3/29/2023 1101  Gross per 24 hour   Intake 980 ml   Output 1690 ml   Net -710 ml       Physical Exam:   Physical Exam  Vitals and nursing note reviewed  Constitutional:       General: He is not in acute distress  Appearance: He is well-developed  Comments: thin   HENT:      Head: Normocephalic and atraumatic  Cardiovascular:      Rate and Rhythm: Normal rate and regular rhythm  Heart sounds: No murmur heard  Pulmonary:      Effort: Pulmonary effort is normal  No respiratory distress  Breath sounds: Normal breath sounds  Abdominal:      Palpations: Abdomen is soft  Tenderness: There is no abdominal tenderness  Musculoskeletal:         General: No swelling  Skin:     General: Skin is warm and dry  Comments: Decubitus wounds   Neurological:      Mental Status: He is alert     Psychiatric:         Mood and Affect: Mood normal           Additional Data:     Labs:  Results from last 7 days   Lab Units 03/28/23  0408   WBC Thousand/uL 7 15   HEMOGLOBIN g/dL 9 2*   HEMATOCRIT % 29 0*   PLATELETS Thousands/uL 693*   NEUTROS PCT % 59   LYMPHS PCT % 26   MONOS PCT % 10   EOS PCT % 5     Results from last 7 days   Lab Units 03/28/23  0408 03/24/23  0323 03/23/23  2118   SODIUM mmol/L 135   < > 130*   POTASSIUM mmol/L 4 6   < > 3 7   CHLORIDE mmol/L 104   < > 98   CO2 mmol/L 25   < > 23   BUN mg/dL 7   < > 6   CREATININE mg/dL 0 43*   < > 0 51*   ANION GAP mmol/L 6   < > 9   CALCIUM mg/dL 8 1*   < > 8 4   ALBUMIN g/dL  --   --  3 1*   TOTAL BILIRUBIN mg/dL  --   --  0 29   ALK PHOS U/L  --   --  99   ALT U/L  --   --  5*   AST U/L  --   --  8* GLUCOSE RANDOM mg/dL 84   < > 120    < > = values in this interval not displayed  Results from last 7 days   Lab Units 03/23/23  2118   INR  1 04             Results from last 7 days   Lab Units 03/25/23  0518 03/24/23  0323 03/23/23 2118   LACTIC ACID mmol/L  --   --  1 0   PROCALCITONIN ng/ml 17 87* 43 37* 50 61*       Lines/Drains:  Invasive Devices     Peripheral Intravenous Line  Duration           Peripheral IV 03/27/23 Left;Proximal;Ventral (anterior) Forearm 1 day          Drain  Duration           Ileostomy LLQ -- days    External Urinary Catheter Medium 3 days                      Imaging: No pertinent imaging reviewed  Recent Cultures (last 7 days):   Results from last 7 days   Lab Units 03/23/23  2218 03/23/23 2217 03/23/23 2120   BLOOD CULTURE   --  No Growth After 5 Days  No Growth After 5 Days     GRAM STAIN RESULT   --  No polys seen*  1+ Gram positive cocci in pairs*  --    URINE CULTURE  >100,000 cfu/ml Providencia rettgeri*  --   --    WOUND CULTURE   --  1+ Growth of Pseudomonas aeruginosa*  1+ Growth of Methicillin Resistant Staphylococcus aureus*  4+ Growth of  --        Last 24 Hours Medication List:   Current Facility-Administered Medications   Medication Dose Route Frequency Provider Last Rate   • acetaminophen  650 mg Oral Q6H PRN Rox Galvan PA-C     • aluminum-magnesium hydroxide-simethicone  30 mL Oral Q6H PRN Rox Galvan PA-C     • cefdinir  300 mg Oral Q12H [address] Moisés MD Kiara     • collagenase   Topical Daily Nery Fleming PA-C     • enoxaparin  30 mg Subcutaneous Daily Rox Galvan PA-C     • HYDROmorphone  0 5 mg Intravenous Q3H PRN Purvi Ochoa MD     • nicotine  1 patch Transdermal Daily PRN Rox Galvan PA-C     • ondansetron  4 mg Intravenous Q4H PRN Purvi Ochoa MD     • oxyCODONE  2 5 mg Oral Q4H PRN Purvi Ochoa MD      Or   • oxyCODONE  5 mg Oral Q4H PRN Purvi Ochoa MD     • senna  1 tablet Oral HS PRN Rox Galvan CAROLANN          Today, Patient Was Seen By: Yamileth Lugo PA-C    **Please Note: This note may have been constructed using a voice recognition system  **

## 2023-03-29 NOTE — PLAN OF CARE
Problem: MOBILITY - ADULT  Goal: Maintain or return to baseline ADL function  Description: INTERVENTIONS:  -  Assess patient's ability to carry out ADLs; assess patient's baseline for ADL function and identify physical deficits which impact ability to perform ADLs (bathing, care of mouth/teeth, toileting, grooming, dressing, etc )  - Assess/evaluate cause of self-care deficits   - Assess range of motion  - Assess patient's mobility; develop plan if impaired  - Assess patient's need for assistive devices and provide as appropriate  - Encourage maximum independence but intervene and supervise when necessary  - Involve family in performance of ADLs  - Assess for home care needs following discharge   - Consider OT consult to assist with ADL evaluation and planning for discharge  - Provide patient education as appropriate  Outcome: Progressing  Goal: Maintains/Returns to pre admission functional level  Description: INTERVENTIONS:  - Perform BMAT or MOVE assessment daily    - Set and communicate daily mobility goal to care team and patient/family/caregiver     - Collaborate with rehabilitation services on mobility goals if consulted  - Perform Range of Motion self upper   - Reposition patient every self   - Dangle patient parapalegic   - Stand patient parapalegic  - Ambulate patient parapalegic  - Out of bed to chair optional    - Out of bed for meals optional   - Out of bed for toileting  - Record patient progress and toleration of activity level   Outcome: Progressing

## 2023-03-29 NOTE — ASSESSMENT & PLAN NOTE
Malnutrition Findings:   Adult Malnutrition type: Chronic illness  Adult Degree of Malnutrition: Other severe protein calorie malnutrition                 Nutrition consulted         BMI Findings:  Adult BMI Classifications: Underweight < 18 5        Body mass index is 13 95 kg/m²

## 2023-03-30 PROBLEM — E87.1 HYPONATREMIA: Status: RESOLVED | Noted: 2022-03-05 | Resolved: 2023-03-30

## 2023-03-30 RX ADMIN — OXYCODONE HYDROCHLORIDE 5 MG: 5 TABLET ORAL at 16:25

## 2023-03-30 RX ADMIN — HYDROMORPHONE HYDROCHLORIDE 0.5 MG: 1 INJECTION, SOLUTION INTRAMUSCULAR; INTRAVENOUS; SUBCUTANEOUS at 14:23

## 2023-03-30 RX ADMIN — HYDROMORPHONE HYDROCHLORIDE 0.5 MG: 1 INJECTION, SOLUTION INTRAMUSCULAR; INTRAVENOUS; SUBCUTANEOUS at 11:09

## 2023-03-30 RX ADMIN — CEFDINIR 300 MG: 300 CAPSULE ORAL at 07:36

## 2023-03-30 RX ADMIN — ENOXAPARIN SODIUM 30 MG: 100 INJECTION SUBCUTANEOUS at 07:35

## 2023-03-30 RX ADMIN — HYDROMORPHONE HYDROCHLORIDE 0.5 MG: 1 INJECTION, SOLUTION INTRAMUSCULAR; INTRAVENOUS; SUBCUTANEOUS at 07:33

## 2023-03-30 RX ADMIN — COLLAGENASE SANTYL: 250 OINTMENT TOPICAL at 12:09

## 2023-03-30 RX ADMIN — CEFDINIR 300 MG: 300 CAPSULE ORAL at 20:46

## 2023-03-30 RX ADMIN — HYDROMORPHONE HYDROCHLORIDE 0.5 MG: 1 INJECTION, SOLUTION INTRAMUSCULAR; INTRAVENOUS; SUBCUTANEOUS at 19:21

## 2023-03-30 RX ADMIN — HYDROMORPHONE HYDROCHLORIDE 0.5 MG: 1 INJECTION, SOLUTION INTRAMUSCULAR; INTRAVENOUS; SUBCUTANEOUS at 23:39

## 2023-03-30 NOTE — PLAN OF CARE
Problem: MOBILITY - ADULT  Goal: Maintain or return to baseline ADL function  Description: INTERVENTIONS:  -  Assess patient's ability to carry out ADLs; assess patient's baseline for ADL function and identify physical deficits which impact ability to perform ADLs (bathing, care of mouth/teeth, toileting, grooming, dressing, etc )  - Assess/evaluate cause of self-care deficits   - Assess range of motion  - Assess patient's mobility; develop plan if impaired  - Assess patient's need for assistive devices and provide as appropriate  - Encourage maximum independence but intervene and supervise when necessary  - Involve family in performance of ADLs  - Assess for home care needs following discharge   - Consider OT consult to assist with ADL evaluation and planning for discharge  - Provide patient education as appropriate  Outcome: Progressing     Problem: PAIN - ADULT  Goal: Verbalizes/displays adequate comfort level or baseline comfort level  Description: Interventions:  - Encourage patient to monitor pain and request assistance  - Assess pain using appropriate pain scale  - Administer analgesics based on type and severity of pain and evaluate response  - Implement non-pharmacological measures as appropriate and evaluate response  - Consider cultural and social influences on pain and pain management  - Notify physician/advanced practitioner if interventions unsuccessful or patient reports new pain  Outcome: Progressing     Problem: INFECTION - ADULT  Goal: Absence or prevention of progression during hospitalization  Description: INTERVENTIONS:  - Assess and monitor for signs and symptoms of infection  - Monitor lab/diagnostic results  - Monitor all insertion sites, i e  indwelling lines, tubes, and drains  - Monitor endotracheal if appropriate and nasal secretions for changes in amount and color  - [place] appropriate cooling/warming therapies per order  - Administer medications as ordered  - Instruct and encourage patient and family to use good hand hygiene technique  - Identify and instruct in appropriate isolation precautions for identified infection/condition  Outcome: Progressing     Problem: DISCHARGE PLANNING  Goal: Discharge to home or other facility with appropriate resources  Description: INTERVENTIONS:  - Identify barriers to discharge w/patient and caregiver  - Arrange for needed discharge resources and transportation as appropriate  - Identify discharge learning needs (meds, wound care, etc )  - Arrange for interpretive services to assist at discharge as needed  - Refer to Case Management Department for coordinating discharge planning if the patient needs post-hospital services based on physician/advanced practitioner order or complex needs related to functional status, cognitive ability, or social support system  Outcome: Progressing

## 2023-03-30 NOTE — CASE MANAGEMENT
Case Management Progress Note    Patient name Smith Goodman  Location /-84 MRN [id number]  : 1970 Date 3/30/2023       LOS (days): 6  Geometric Mean LOS (GMLOS) (days):   Days to GMLOS:        OBJECTIVE:     Current admission status: Inpatient  Preferred Pharmacy:   [address] [place] [address] [id number] Chelsea Zamorano, [address] [place] [id number]  Phone: [phone] Fax: [phone]    Primary Care Provider: No primary care provider on file  Primary Insurance:   Secondary Insurance:     PROGRESS NOTE:  [facility] likely to accept patient and reviewed their policies, etc with him in person today  They will make a final determination in the morning

## 2023-03-30 NOTE — PROGRESS NOTES
[place]  Progress Note  Name: Maya Jacobson  MRN: [id number]  Unit/Bed#: -01 I Date of Admission: 3/23/2023   Date of Service: 3/30/2023 I Hospital Day: 6    Assessment/Plan   * Pyelonephritis  Assessment & Plan  · Utilizes external condom catheter - avoiding use in hospital secondary to wound on bottom of penis  · UC - providencia rettgeri  · BC - no growth to date  · CT with evidence of severe cystitis  · cefepime and vancomycin changed to ceftriaxone per ID - now on oral cefdinir through 4/3  · urinary retention protocol     Sacral wound, subsequent encounter  Assessment & Plan  · 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  · Reports increasing size and drainage over the last 1 week  · Surgery consult appreciated  · Continue local wound care  · No need for debridement at this point  Paraplegia [facility])  Assessment & Plan  · Status post work injury  · Fairly independent with self-reported wheelchair and handicap vehicle  · Unable to care for sacral wounds himself at home, looking for placement until wounds are healed  Patient is currently homeless and lives in his car  He signed out of SNF about 3 weeks ago  · CM consulted and working on placement - awaiting accepting facility    Severe protein-calorie malnutrition ([facility] [address] )  Assessment & Plan  Malnutrition Findings:   Adult Malnutrition type: Chronic illness  Adult Degree of Malnutrition: Other severe protein calorie malnutrition                 Nutrition consulted         BMI Findings:  Adult BMI Classifications: Underweight < 18 5        Body mass index is 15 33 kg/m²         S/P BKA (below knee amputation), left [facility])  Assessment & Plan  · Performed at [facility] 3/14/2022 by Dr Tee Arellano for definitive management of decubitus ulcer  · Presents with ulcer to RLE  · Podiatry consulted for lower extremity wounds - local wound care    Sepsis without acute organ dysfunction (HCC)  Assessment & Plan  · SIRS criteria met on admission: Tachycardia and leukocytosis  · Suspected source pyelonephritis  · No severe sepsis criteria met  · Continue antibiotics  · Blood cultures x2 negative to date    Personal history of venous thrombosis and embolism  Assessment & Plan  · Status post IVC filter    Status post colostomy [facility])  Assessment & Plan  · Patient reports normal output of colostomy    Hyponatremia-resolved as of 3/30/2023  Assessment & Plan  · Sodium at 130 on admission, now 136  Suspect in the setting of volume depletion  · Baseline sodium ranges between 132-137  · Given 1 L normal saline bolus in the ER  · Hold further IV fluids for now  · Resolved              VTE Pharmacologic Prophylaxis: VTE Score: 5 Moderate Risk (Score 3-4) - Pharmacological DVT Prophylaxis Ordered: enoxaparin (Lovenox)  Patient Centered Rounds: I performed bedside rounds with nursing staff today  Discussions with Specialists or Other Care Team Provider: Case management    Education and Discussions with Family / Patient: Patient declined call to   Total Time Spent on Date of Encounter in care of patient: 45 minutes This time was spent on one or more of the following: performing physical exam; counseling and coordination of care; obtaining or reviewing history; documenting in the medical record; reviewing/ordering tests, medications or procedures; communicating with other healthcare professionals and discussing with patient's family/caregivers  Current Length of Stay: 6 day(s)  Current Patient Status: Inpatient   Certification Statement: The patient will continue to require additional inpatient hospital stay due to Pending placement  Discharge Plan: Anticipate discharge in 24-48 hrs to rehab facility  Code Status: Level 1 - Full Code    Subjective:   No acute events overnight  Patient awaiting placement      Objective:     Vitals:   Temp (24hrs), Av 5 °F (36 9 °C), Min:98 3 °F (36 8 °C), Max:98 6 °F (37 °C)    Temp:  [98 3 °F (36 8 °C)-98 6 °F (37 °C)] 98 3 °F (36 8 °C)  HR:  [73-77] 77  Resp:  [16] 16  BP: ()/(58-70) 110/70  SpO2:  [96 %-98 %] 98 %  Body mass index is 15 33 kg/m²  Input and Output Summary (last 24 hours): Intake/Output Summary (Last 24 hours) at 3/30/2023 1158  Last data filed at 3/30/2023 1101  Gross per 24 hour   Intake 300 ml   Output 1200 ml   Net -900 ml       Physical Exam:   Physical Exam  Vitals and nursing note reviewed  Constitutional:       Appearance: Normal appearance  HENT:      Head: Normocephalic and atraumatic  Mouth/Throat:      Mouth: Mucous membranes are moist       Pharynx: Oropharynx is clear  No oropharyngeal exudate  Eyes:      Extraocular Movements: Extraocular movements intact  Cardiovascular:      Rate and Rhythm: Normal rate and regular rhythm  Pulses: Normal pulses  Heart sounds: Normal heart sounds  No murmur heard  No friction rub  No gallop  Pulmonary:      Effort: Pulmonary effort is normal  No respiratory distress  Breath sounds: Normal breath sounds  No stridor  No wheezing or rales  Abdominal:      General: Abdomen is flat  Bowel sounds are normal  There is no distension  Palpations: Abdomen is soft  Tenderness: There is no abdominal tenderness  Musculoskeletal:      Right lower leg: No edema  Left lower leg: No edema  Skin:     General: Skin is warm and dry  Neurological:      General: No focal deficit present  Mental Status: He is alert and oriented to person, place, and time            Additional Data:     Labs:  Results from last 7 days   Lab Units 03/28/23  0408   WBC Thousand/uL 7 15   HEMOGLOBIN g/dL 9 2*   HEMATOCRIT % 29 0*   PLATELETS Thousands/uL 693*   NEUTROS PCT % 59   LYMPHS PCT % 26   MONOS PCT % 10   EOS PCT % 5     Results from last 7 days   Lab Units 03/28/23  0408 03/24/23  0323 03/23/23  2118   SODIUM mmol/L 135   < > 130* POTASSIUM mmol/L 4 6   < > 3 7   CHLORIDE mmol/L 104   < > 98   CO2 mmol/L 25   < > 23   BUN mg/dL 7   < > 6   CREATININE mg/dL 0 43*   < > 0 51*   ANION GAP mmol/L 6   < > 9   CALCIUM mg/dL 8 1*   < > 8 4   ALBUMIN g/dL  --   --  3 1*   TOTAL BILIRUBIN mg/dL  --   --  0 29   ALK PHOS U/L  --   --  99   ALT U/L  --   --  5*   AST U/L  --   --  8*   GLUCOSE RANDOM mg/dL 84   < > 120    < > = values in this interval not displayed  Results from last 7 days   Lab Units 03/23/23  2118   INR  1 04             Results from last 7 days   Lab Units 03/25/23  0518 03/24/23  0323 03/23/23 2118   LACTIC ACID mmol/L  --   --  1 0   PROCALCITONIN ng/ml 17 87* 43 37* 50 61*       Lines/Drains:  Invasive Devices     Peripheral Intravenous Line  Duration           Peripheral IV 03/29/23 Left;Ventral (anterior) Forearm <1 day          Drain  Duration           Ileostomy LLQ -- days    External Urinary Catheter Medium 4 days                      Imaging: No pertinent imaging reviewed  Recent Cultures (last 7 days):   Results from last 7 days   Lab Units 03/23/23  2218 03/23/23  2217 03/23/23 2120   BLOOD CULTURE   --  No Growth After 5 Days  No Growth After 5 Days     GRAM STAIN RESULT   --  No polys seen*  1+ Gram positive cocci in pairs*  --    URINE CULTURE  >100,000 cfu/ml Providencia rettgeri*  --   --    WOUND CULTURE   --  1+ Growth of Pseudomonas aeruginosa*  1+ Growth of Methicillin Resistant Staphylococcus aureus*  4+ Growth of  --        Last 24 Hours Medication List:   Current Facility-Administered Medications   Medication Dose Route Frequency Provider Last Rate   • acetaminophen  650 mg Oral Q6H PRN Jenna Peoples PA-C     • aluminum-magnesium hydroxide-simethicone  30 mL Oral Q6H PRN Jenna Peoples PA-C     • cefdinir  300 mg Oral Q12H [facility] Luna Miranda MD     • collagenase   Topical Daily Nery Fleming PA-C     • enoxaparin  30 mg Subcutaneous Daily Jenna Peoples PA-C     • HYDROmorphone  0 5 mg Intravenous Q3H PRN Michael Guzmán MD     • nicotine  1 patch Transdermal Daily PRN Priyank Bronson PA-C     • ondansetron  4 mg Intravenous Q4H PRN Michael Guzmán MD     • oxyCODONE  2 5 mg Oral Q4H PRN Michael Guzmán MD      Or   • oxyCODONE  5 mg Oral Q4H PRN Michael Guzmán MD     • senna  1 tablet Oral HS PRN Priyank Bronson PA-C          Today, Patient Was Seen By: John Jenkins PA-C    **Please Note: This note may have been constructed using a voice recognition system  **

## 2023-03-30 NOTE — ASSESSMENT & PLAN NOTE
· Performed at [facility] 3/14/2022 by Dr Heather Dias for definitive management of decubitus ulcer  · Presents with ulcer to RLE  · Podiatry consulted for lower extremity wounds - local wound care

## 2023-03-30 NOTE — ASSESSMENT & PLAN NOTE
Malnutrition Findings:   Adult Malnutrition type: Chronic illness  Adult Degree of Malnutrition: Other severe protein calorie malnutrition                 Nutrition consulted         BMI Findings:  Adult BMI Classifications: Underweight < 18 5        Body mass index is 15 33 kg/m²

## 2023-03-30 NOTE — DISCHARGE INSTR - OTHER ORDERS
Right heel and right foot  Irrigate wounds with saline  Apply nickel thickness Santyl covered with Xeroform, cover with dry dressing  Please change dressings daily

## 2023-03-31 VITALS
DIASTOLIC BLOOD PRESSURE: 63 MMHG | HEART RATE: 73 BPM | HEIGHT: 67 IN | BODY MASS INDEX: 15.33 KG/M2 | RESPIRATION RATE: 16 BRPM | OXYGEN SATURATION: 99 % | SYSTOLIC BLOOD PRESSURE: 112 MMHG | TEMPERATURE: 98.2 F | WEIGHT: 97.66 LBS

## 2023-03-31 PROBLEM — A41.9 SEPSIS WITHOUT ACUTE ORGAN DYSFUNCTION (HCC): Status: RESOLVED | Noted: 2023-03-24 | Resolved: 2023-03-31

## 2023-03-31 LAB
ANION GAP SERPL CALCULATED.3IONS-SCNC: 4 MMOL/L (ref 4–13)
BUN SERPL-MCNC: 8 MG/DL (ref 5–25)
CALCIUM SERPL-MCNC: 8.7 MG/DL (ref 8.4–10.2)
CHLORIDE SERPL-SCNC: 104 MMOL/L (ref 96–108)
CO2 SERPL-SCNC: 28 MMOL/L (ref 21–32)
CREAT SERPL-MCNC: 0.48 MG/DL (ref 0.6–1.3)
ERYTHROCYTE [DISTWIDTH] IN BLOOD BY AUTOMATED COUNT: 18.6 % (ref 11.6–15.1)
FLUAV RNA RESP QL NAA+PROBE: NEGATIVE
FLUBV RNA RESP QL NAA+PROBE: NEGATIVE
GFR SERPL CREATININE-BSD FRML MDRD: 128 ML/MIN/1.73SQ M
GLUCOSE SERPL-MCNC: 94 MG/DL (ref 65–140)
HCT VFR BLD AUTO: 32.5 % (ref 36.5–49.3)
HGB BLD-MCNC: 9.9 G/DL (ref 12–17)
MCH RBC QN AUTO: 27.4 PG (ref 26.8–34.3)
MCHC RBC AUTO-ENTMCNC: 30.5 G/DL (ref 31.4–37.4)
MCV RBC AUTO: 90 FL (ref 82–98)
PLATELET # BLD AUTO: 683 THOUSANDS/UL (ref 149–390)
PMV BLD AUTO: 7.9 FL (ref 8.9–12.7)
POTASSIUM SERPL-SCNC: 4.2 MMOL/L (ref 3.5–5.3)
RBC # BLD AUTO: 3.61 MILLION/UL (ref 3.88–5.62)
RSV RNA RESP QL NAA+PROBE: NEGATIVE
SARS-COV-2 RNA RESP QL NAA+PROBE: NEGATIVE
SODIUM SERPL-SCNC: 136 MMOL/L (ref 135–147)
WBC # BLD AUTO: 7.81 THOUSAND/UL (ref 4.31–10.16)

## 2023-03-31 RX ORDER — OXYCODONE HYDROCHLORIDE 5 MG/1
5 TABLET ORAL EVERY 4 HOURS PRN
Qty: 12 TABLET | Refills: 0 | Status: SHIPPED | OUTPATIENT
Start: 2023-03-31 | End: 2023-04-02

## 2023-03-31 RX ORDER — CEFDINIR 300 MG/1
300 CAPSULE ORAL EVERY 12 HOURS SCHEDULED
Qty: 7 CAPSULE | Refills: 0
Start: 2023-03-31 | End: 2023-04-04

## 2023-03-31 RX ADMIN — ENOXAPARIN SODIUM 30 MG: 100 INJECTION SUBCUTANEOUS at 09:37

## 2023-03-31 RX ADMIN — COLLAGENASE SANTYL: 250 OINTMENT TOPICAL at 11:39

## 2023-03-31 RX ADMIN — CEFDINIR 300 MG: 300 CAPSULE ORAL at 09:38

## 2023-03-31 RX ADMIN — HYDROMORPHONE HYDROCHLORIDE 0.5 MG: 1 INJECTION, SOLUTION INTRAMUSCULAR; INTRAVENOUS; SUBCUTANEOUS at 04:28

## 2023-03-31 RX ADMIN — HYDROMORPHONE HYDROCHLORIDE 0.5 MG: 1 INJECTION, SOLUTION INTRAMUSCULAR; INTRAVENOUS; SUBCUTANEOUS at 09:34

## 2023-03-31 RX ADMIN — OXYCODONE HYDROCHLORIDE 5 MG: 5 TABLET ORAL at 11:29

## 2023-03-31 RX ADMIN — HYDROMORPHONE HYDROCHLORIDE 0.5 MG: 1 INJECTION, SOLUTION INTRAMUSCULAR; INTRAVENOUS; SUBCUTANEOUS at 13:03

## 2023-03-31 NOTE — DISCHARGE SUMMARY
[place]  Discharge- Stormy Levy 1970, 50 y o  male MRN: [id number]  Unit/Bed#: -01 Encounter: [id number]  Primary Care Provider: No primary care provider on file  Date and time admitted to hospital: 3/23/2023  8:32 PM    * Pyelonephritis  Assessment & Plan  · Utilizes external condom catheter - avoiding use in hospital secondary to wound on bottom of penis  · UC - providencia rettgeri  · BC - no growth to date  · CT with evidence of severe cystitis  · cefepime and vancomycin changed to ceftriaxone per ID - now on oral cefdinir through 4/3    Sacral wound, subsequent encounter  Assessment & Plan  · 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  · Reports increasing size and drainage over the last 1 week  · Surgery consult appreciated  · Continue local wound care  · No need for debridement at this point  Paraplegia [facility])  Assessment & Plan  · Status post work injury  · Fairly independent with self-reported wheelchair and handicap vehicle  · Unable to care for sacral wounds himself at home, looking for placement until wounds are healed  Patient is currently homeless and lives in his car  He signed out of SNF about 3 weeks ago  · CM consulted and working on placement - Discharge to [facility] in [place]    Severe protein-calorie malnutrition [facility])  Assessment & Plan  Malnutrition Findings:   Adult Malnutrition type: Chronic illness  Adult Degree of Malnutrition: Other severe protein calorie malnutrition                 Nutrition consulted         BMI Findings:  Adult BMI Classifications: Underweight < 18 5        Body mass index is 15 3 kg/m²         S/P BKA (below knee amputation), left [facility])  Assessment & Plan  · Performed at [facility] 3/14/2022 by Dr Margo Mujica for definitive management of decubitus ulcer  · Presents with ulcer to RLE  · Podiatry consulted for lower extremity wounds - local wound care    Personal history of venous thrombosis and embolism  Assessment & Plan  · Status post IVC filter    Status post colostomy [facility])  Assessment & Plan  · Patient reports normal output of colostomy    Sepsis without acute organ dysfunction (HCC)-resolved as of 3/31/2023  Assessment & Plan  · SIRS criteria met on admission: Tachycardia and leukocytosis  · Suspected source pyelonephritis  · No severe sepsis criteria met  · Continue antibiotics  · Blood cultures x2 negative to date  · Resolved    Hyponatremia-resolved as of 3/30/2023  Assessment & Plan  · Sodium at 130 on admission, now 136  Suspect in the setting of volume depletion  · Baseline sodium ranges between 132-137  · Given 1 L normal saline bolus in the ER  · Hold further IV fluids for now  · Resolved         Medical Problems     Resolved Problems  Date Reviewed: 3/31/2023          Resolved    Hyponatremia 3/30/2023     Resolved by  Nate Gayle PA-C    Overview Signed 3/24/2023 12:31 AM by Omero West PA-C     Last Assessment & Plan:   Formatting of this note might be different from the original    improved         Sepsis without acute organ dysfunction ([facility] [address] ) 3/31/2023     Resolved by  Nate Gayle PA-C        Discharging Physician / Practitioner: Nate Gayle PA-C  PCP: No primary care provider on file  Admission Date:   Admission Orders (From admission, onward)     Ordered        03/24/23 0029  INPATIENT ADMISSION  Once                      Discharge Date: 03/31/23    Consultations During Hospital Stay:  · Podiatry  · Infectious diseases  · General surgery    Procedures Performed:   · CT abdomen/pelvis    Significant Findings / Test Results:   · CT abdomen/pelvis with sacral decubitus ulcers and cystitis/ureteritis  · Procalcitonin 50 admission  · Blood cultures negative    Incidental Findings:   · None    Test Results Pending at Discharge (will require follow up):    · None     Outpatient Tests "Requested:  · None    Complications: None    Reason for Admission: Worsening sacral wounds    Hospital Course:   Trupti Yates is a 50 y o  male patient PMH of paraplegia, left BKA, colostomy, multiple wounds who originally presented to the hospital on 3/23/2023 due to worsening sacral wounds over 1 week with increase in size and drainage  Reported living out of his car and unable to perform appropriate wound care  On admission, met sepsis criteria with tachycardia and leukocytosis  CT abdomen/pelvis showed severe cystitis with concerns for acsending infection/ureteritis  Patient was initially started on Zosyn/vancomycin in the emergency department  He was evaluated by general surgery, who recommended local wound care without need for debridement of sacral wounds  He was then evaluated by infectious diseases whodiscontinued Zosyn and continued vancomycin  It was determined that his leukocytosis is most likely as a result of early pyelonephritis and antibiotics were again changed to ceftriaxone  He will continue with oral course of cefdinir through 4 3  He required additional days in the hospital while awaiting placement  Outpatient follow-up with wound care and with PCP  He is being discharged to [facility] [place]  Please see above list of diagnoses and related plan for additional information  Condition at Discharge: stable    Discharge Day Visit / Exam:   Subjective: No acute events overnight  Vitals: Blood Pressure: 112/63 (03/31/23 0817)  Pulse: 73 (03/31/23 0817)  Temperature: 98 2 °F (36 8 °C) (03/31/23 0817)  Temp Source: Oral (03/31/23 0817)  Respirations: 16 (03/31/23 0817)  Height: 5' 7\" (170 2 cm) (03/23/23 1755)  Weight - Scale: 44 3 kg (97 lb 10 6 oz) (03/31/23 0435)  SpO2: 99 % (03/31/23 0817)  Exam:   Physical Exam  Vitals and nursing note reviewed  Constitutional:       Appearance: Normal appearance  HENT:      Head: Normocephalic and atraumatic        Mouth/Throat: " Mouth: Mucous membranes are moist       Pharynx: Oropharynx is clear  No oropharyngeal exudate  Eyes:      Extraocular Movements: Extraocular movements intact  Cardiovascular:      Rate and Rhythm: Normal rate and regular rhythm  Pulses: Normal pulses  Heart sounds: Normal heart sounds  No murmur heard  No friction rub  No gallop  Pulmonary:      Effort: Pulmonary effort is normal  No respiratory distress  Breath sounds: Normal breath sounds  No stridor  No wheezing or rales  Abdominal:      General: Abdomen is flat  Bowel sounds are normal  There is no distension  Palpations: Abdomen is soft  Tenderness: There is no abdominal tenderness  Comments: Colostomy   Musculoskeletal:      Right lower leg: No edema  Left lower leg: No edema  Skin:     General: Skin is warm and dry  Neurological:      General: No focal deficit present  Mental Status: He is alert and oriented to person, place, and time  Discussion with Family: Patient declined call to   Discharge instructions/Information to patient and family:   See after visit summary for information provided to patient and family  Provisions for Follow-Up Care:  See after visit summary for information related to follow-up care and any pertinent home health orders  Disposition:   Acute Rehab at [facility] Readmission: None     Discharge Statement:  I spent 60 minutes discharging the patient  This time was spent on the day of discharge  I had direct contact with the patient on the day of discharge  Greater than 50% of the total time was spent examining patient, answering all patient questions, arranging and discussing plan of care with patient as well as directly providing post-discharge instructions  Additional time then spent on discharge activities      Discharge Medications:  See after visit summary for reconciled discharge medications provided to patient and/or family        **Please Note: This note may have been constructed using a voice recognition system**

## 2023-03-31 NOTE — ASSESSMENT & PLAN NOTE
· Utilizes external condom catheter - avoiding use in hospital secondary to wound on bottom of penis  · UC - providencia rettgeri  · BC - no growth to date  · CT with evidence of severe cystitis  · cefepime and vancomycin changed to ceftriaxone per ID - now on oral cefdinir through 4/3

## 2023-03-31 NOTE — ASSESSMENT & PLAN NOTE
· SIRS criteria met on admission: Tachycardia and leukocytosis  · Suspected source pyelonephritis  · No severe sepsis criteria met  · Continue antibiotics  · Blood cultures x2 negative to date  · Resolved

## 2023-03-31 NOTE — ASSESSMENT & PLAN NOTE
· Performed at [facility] 3/14/2022 by Dr Enma Jones for definitive management of decubitus ulcer  · Presents with ulcer to RLE  · Podiatry consulted for lower extremity wounds - local wound care

## 2023-03-31 NOTE — PLAN OF CARE
Problem: MOBILITY - ADULT  Goal: Maintain or return to baseline ADL function  Description: INTERVENTIONS:  -  Assess patient's ability to carry out ADLs; assess patient's baseline for ADL function and identify physical deficits which impact ability to perform ADLs (bathing, care of mouth/teeth, toileting, grooming, dressing, etc )  - Assess/evaluate cause of self-care deficits   - Assess range of motion  - Assess patient's mobility; develop plan if impaired  - Assess patient's need for assistive devices and provide as appropriate  - Encourage maximum independence but intervene and supervise when necessary  - Involve family in performance of ADLs  - Assess for home care needs following discharge   - Consider OT consult to assist with ADL evaluation and planning for discharge  - Provide patient education as appropriate  Outcome: Progressing  Goal: Maintains/Returns to pre admission functional level  Description: INTERVENTIONS:  - Perform BMAT or MOVE assessment daily    - Set and communicate daily mobility goal to care team and patient/family/caregiver  - Collaborate with rehabilitation services on mobility goals if consulted  - Perform Range of Motion  times a day  - Reposition patient every  hours    - Dangle patient  times a day  - Stand patient  times a day  - Ambulate patient  times a day  - Out of bed to chair  times a day   - Out of bed for meals  times a day  - Out of bed for toileting  - Record patient progress and toleration of activity level   Outcome: Progressing     Problem: PAIN - ADULT  Goal: Verbalizes/displays adequate comfort level or baseline comfort level  Description: Interventions:  - Encourage patient to monitor pain and request assistance  - Assess pain using appropriate pain scale  - Administer analgesics based on type and severity of pain and evaluate response  - Implement non-pharmacological measures as appropriate and evaluate response  - Consider cultural and social influences on pain and pain management  - Notify physician/advanced practitioner if interventions unsuccessful or patient reports new pain  Outcome: Progressing     Problem: INFECTION - ADULT  Goal: Absence or prevention of progression during hospitalization  Description: INTERVENTIONS:  - Assess and monitor for signs and symptoms of infection  - Monitor lab/diagnostic results  - Monitor all insertion sites, i e  indwelling lines, tubes, and drains  - Monitor endotracheal if appropriate and nasal secretions for changes in amount and color  - [place] appropriate cooling/warming therapies per order  - Administer medications as ordered  - Instruct and encourage patient and family to use good hand hygiene technique  - Identify and instruct in appropriate isolation precautions for identified infection/condition  Outcome: Progressing  Goal: Absence of fever/infection during neutropenic period  Description: INTERVENTIONS:  - Monitor WBC    Outcome: Progressing     Problem: SAFETY ADULT  Goal: Maintain or return to baseline ADL function  Description: INTERVENTIONS:  -  Assess patient's ability to carry out ADLs; assess patient's baseline for ADL function and identify physical deficits which impact ability to perform ADLs (bathing, care of mouth/teeth, toileting, grooming, dressing, etc )  - Assess/evaluate cause of self-care deficits   - Assess range of motion  - Assess patient's mobility; develop plan if impaired  - Assess patient's need for assistive devices and provide as appropriate  - Encourage maximum independence but intervene and supervise when necessary  - Involve family in performance of ADLs  - Assess for home care needs following discharge   - Consider OT consult to assist with ADL evaluation and planning for discharge  - Provide patient education as appropriate  Outcome: Progressing  Goal: Maintains/Returns to pre admission functional level  Description: INTERVENTIONS:  - Perform BMAT or MOVE assessment daily    - Set and communicate daily mobility goal to care team and patient/family/caregiver  - Collaborate with rehabilitation services on mobility goals if consulted  - Perform Range of Motion times a day  - Reposition patient every  hours    - Dangle patient  times a day  - Stand patient  times a day  - Ambulate patient  times a day  - Out of bed to chair times a day   - Out of bed for meals  times a day  - Out of bed for toileting  - Record patient progress and toleration of activity level   Outcome: Progressing  Goal: Patient will remain free of falls  Description: INTERVENTIONS:  - Educate patient/family on patient safety including physical limitations  - Instruct patient to call for assistance with activity   - Consult OT/PT to assist with strengthening/mobility   - Keep Call bell within reach  - Keep bed low and locked with side rails adjusted as appropriate  - Keep care items and personal belongings within reach  - Initiate and maintain comfort rounds  - Make Fall Risk Sign visible to staff  - Offer Toileting every  Hours, in advance of need  - Initiate/Maintain alarm  - Obtain necessary fall risk management equipment:   - Apply yellow socks and bracelet for high fall risk patients  - Consider moving patient to room near nurses station  Outcome: Progressing     Problem: DISCHARGE PLANNING  Goal: Discharge to home or other facility with appropriate resources  Description: INTERVENTIONS:  - Identify barriers to discharge w/patient and caregiver  - Arrange for needed discharge resources and transportation as appropriate  - Identify discharge learning needs (meds, wound care, etc )  - Arrange for interpretive services to assist at discharge as needed  - Refer to Case Management Department for coordinating discharge planning if the patient needs post-hospital services based on physician/advanced practitioner order or complex needs related to functional status, cognitive ability, or social support system  Outcome: Progressing Problem: Knowledge Deficit  Goal: Patient/family/caregiver demonstrates understanding of disease process, treatment plan, medications, and discharge instructions  Description: Complete learning assessment and assess knowledge base  Interventions:  - Provide teaching at level of understanding  - Provide teaching via preferred learning methods  Outcome: Progressing     Problem: Prexisting or High Potential for Compromised Skin Integrity  Goal: Skin integrity is maintained or improved  Description: INTERVENTIONS:  - Identify patients at risk for skin breakdown  - Assess and monitor skin integrity  - Assess and monitor nutrition and hydration status  - Monitor labs   - Assess for incontinence   - Turn and reposition patient  - Assist with mobility/ambulation  - Relieve pressure over bony prominences  - Avoid friction and shearing  - Provide appropriate hygiene as needed including keeping skin clean and dry  - Evaluate need for skin moisturizer/barrier cream  - Collaborate with interdisciplinary team   - Patient/family teaching  - Consider wound care consult   Outcome: Progressing     Problem: Nutrition/Hydration-ADULT  Goal: Nutrient/Hydration intake appropriate for improving, restoring or maintaining nutritional needs  Description: Monitor and assess patient's nutrition/hydration status for malnutrition  Collaborate with interdisciplinary team and initiate plan and interventions as ordered  Monitor patient's weight and dietary intake as ordered or per policy  Utilize nutrition screening tool and intervene as necessary  Determine patient's food preferences and provide high-protein, high-caloric foods as appropriate       INTERVENTIONS:  - Monitor oral intake, urinary output, labs, and treatment plans  - Assess nutrition and hydration status and recommend course of action  - Evaluate amount of meals eaten  - Assist patient with eating if necessary   - Allow adequate time for meals  - Recommend/ encourage appropriate diets, oral nutritional supplements, and vitamin/mineral supplements  - Order, calculate, and assess calorie counts as needed  - Recommend, monitor, and adjust tube feedings and TPN/PPN based on assessed needs  - Assess need for intravenous fluids  - Provide specific nutrition/hydration education as appropriate  - Include patient/family/caregiver in decisions related to nutrition  Outcome: Progressing     Problem: GENITOURINARY - ADULT  Goal: Maintains or returns to baseline urinary function  Description: INTERVENTIONS:  - Assess urinary function  - Encourage oral fluids to ensure adequate hydration if ordered  - Administer IV fluids as ordered to ensure adequate hydration  - Administer ordered medications as needed  - Offer frequent toileting  - Follow urinary retention protocol if ordered  Outcome: Progressing  Goal: Absence of urinary retention  Description: INTERVENTIONS:  - Assess patient’s ability to void and empty bladder  - Monitor I/O  - Bladder scan as needed  - Discuss with physician/AP medications to alleviate retention as needed  - Discuss catheterization for long term situations as appropriate  Outcome: Progressing     Problem: METABOLIC, FLUID AND ELECTROLYTES - ADULT  Goal: Electrolytes maintained within normal limits  Description: INTERVENTIONS:  - Monitor labs and assess patient for signs and symptoms of electrolyte imbalances  - Administer electrolyte replacement as ordered  - Monitor response to electrolyte replacements, including repeat lab results as appropriate  - Instruct patient on fluid and nutrition as appropriate  Outcome: Progressing  Goal: Fluid balance maintained  Description: INTERVENTIONS:  - Monitor labs   - Monitor I/O and WT  - Instruct patient on fluid and nutrition as appropriate  - Assess for signs & symptoms of volume excess or deficit  Outcome: Progressing     Problem: SKIN/TISSUE INTEGRITY - ADULT  Goal: Skin Integrity remains intact(Skin Breakdown Prevention)  Description: Assess:  -Perform Devaughn assessment every   -Clean and moisturize skin every   -Inspect skin when repositioning, toileting, and assisting with ADLS  -Assess under medical devices such as  every   -Assess extremities for adequate circulation and sensation     Bed Management:  -Have minimal linens on bed & keep smooth, unwrinkled  -Change linens as needed when moist or perspiring  -Avoid sitting or lying in one position for more than  hours while in bed  -Keep HOB at degrees     Toileting:  -Offer bedside commode  -Assess for incontinence every   -Use incontinent care products after each incontinent episode such as     Activity:  -Mobilize patient  times a day  -Encourage activity and walks on unit  -Encourage or provide ROM exercises   -Turn and reposition patient every  Hours  -Use appropriate equipment to lift or move patient in bed  -Instruct/ Assist with weight shifting every  when out of bed in chair  -Consider limitation of chair time  hour intervals    Skin Care:  -Avoid use of baby powder, tape, friction and shearing, hot water or constrictive clothing  -Relieve pressure over bony prominences using   -Do not massage red bony areas    Next Steps:  -Teach patient strategies to minimize risks such as    -Consider consults to  interdisciplinary teams such as   Outcome: Progressing  Goal: Incision(s), wounds(s) or drain site(s) healing without S/S of infection  Description: INTERVENTIONS  - Assess and document dressing, incision, wound bed, drain sites and surrounding tissue  - Provide patient and family education  - Perform skin care/dressing changes every   Outcome: Progressing  Goal: Pressure injury heals and does not worsen  Description: Interventions:  - Implement low air loss mattress or specialty surface (Criteria met)  - Apply silicone foam dressing  - Instruct/assist with weight shifting every  minutes when in chair   - Limit chair time to  hour intervals  - Use special pressure reducing interventions such as  when in chair   - Apply fecal or urinary incontinence containment device   - Perform passive or active ROM every   - Turn and reposition patient & offload bony prominences every  hours   - Utilize friction reducing device or surface for transfers   - Consider consults to  interdisciplinary teams such as   - Use incontinent care products after each incontinent episode such   - Consider nutrition services referral as needed  Outcome: Progressing     Problem: HEMATOLOGIC - ADULT  Goal: Maintains hematologic stability  Description: INTERVENTIONS  - Assess for signs and symptoms of bleeding or hemorrhage  - Monitor labs  - Administer supportive blood products/factors as ordered and appropriate  Outcome: Progressing     Problem: MUSCULOSKELETAL - ADULT  Goal: Maintain or return mobility to safest level of function  Description: INTERVENTIONS:  - Assess patient's ability to carry out ADLs; assess patient's baseline for ADL function and identify physical deficits which impact ability to perform ADLs (bathing, care of mouth/teeth, toileting, grooming, dressing, etc )  - Assess/evaluate cause of self-care deficits   - Assess range of motion  - Assess patient's mobility  - Assess patient's need for assistive devices and provide as appropriate  - Encourage maximum independence but intervene and supervise when necessary  - Involve family in performance of ADLs  - Assess for home care needs following discharge   - Consider OT consult to assist with ADL evaluation and planning for discharge  - Provide patient education as appropriate  Outcome: Progressing     Problem: Potential for Falls  Goal: Patient will remain free of falls  Description: INTERVENTIONS:  - Educate patient/family on patient safety including physical limitations  - Instruct patient to call for assistance with activity   - Consult OT/PT to assist with strengthening/mobility   - Keep Call bell within reach  - Keep bed low and locked with side rails adjusted as appropriate  - Keep care items and personal belongings within reach  - Initiate and maintain comfort rounds  - Make Fall Risk Sign visible to staff  - Offer Toileting every  Hours, in advance of need  - Initiate/Maintain alarm  - Obtain necessary fall risk management equipment:   - Apply yellow socks and bracelet for high fall risk patients  - Consider moving patient to room near nurses station  Outcome: Progressing

## 2023-03-31 NOTE — ASSESSMENT & PLAN NOTE
· Status post work injury  · Fairly independent with self-reported wheelchair and handicap vehicle  · Unable to care for sacral wounds himself at home, looking for placement until wounds are healed  Patient is currently homeless and lives in his car   He signed out of SNF about 3 weeks ago  · CM consulted and working on placement - Discharge to [facility] in [place]

## 2023-03-31 NOTE — CASE MANAGEMENT
Case Management Discharge Planning Note    Patient name Tri Quiñones  Location /-38 MRN [id number]  : 1970 Date 3/31/2023       Current Admission Date: 3/23/2023  Current Admission Diagnosis:Pyelonephritis   Patient Active Problem List    Diagnosis Date Noted   • Severe protein-calorie malnutrition ([facility] [address] [facility] ) 2023   • Sepsis without acute organ dysfunction ([facility] [facility] ) 2023   • Pyelonephritis 2023   • Sacral wound, subsequent encounter 2023   • S/P BKA (below knee amputation), left ([facility] ) 2023   • Status post colostomy ([facility] [facility] ) 2021   • Personal history of venous thrombosis and embolism 2021   • Paraplegia ([facility] [address] [facility] ) 2021      LOS (days): 7  Geometric Mean LOS (GMLOS) (days):   Days to GMLOS:     OBJECTIVE:  Risk of Unplanned Readmission Score: 10 06      Current admission status: Inpatient   Preferred Pharmacy:   [address] [place] [address] #[id number]  Phone: [phone] Fax: [phone]    Primary Care Provider: No primary care provider on file  Primary Insurance:   Secondary Insurance:     DISCHARGE DETAILS:    Discharge planning discussed with[de-identified] patient  Freedom of Choice: Yes  Comments - Freedom of Choice: Going to [facility] contacted family/caregiver?: No- see comments (pt is alert and in charge of his care)  Were Treatment Team discharge recommendations reviewed with patient/caregiver?: Yes  Did patient/caregiver verbalize understanding of patient care needs?: Yes  Were patient/caregiver advised of the risks associated with not following Treatment Team discharge recommendations?: Yes    Requested  [address]         Is the patient interested in [address] at discharge?: No    DME Referral Provided  Referral made for DME?: No    Other Referral/Resources/Interventions Provided:  Interventions: SNF  Referral Comments: [facility]      Treatment Team Recommendation: SNF  Discharge Destination Plan[de-identified] SNF  Transport at Discharge : Wheelchair van  Dispatcher Contacted: Yes  Number/Name of Dispatcher: Tara Goff and Unit #): TBD  ETA of Transport (Date): 03/31/23  ETA of Transport (Time): 1400     Transfer Mode: Wheelchair  Accompanied by: EMS personnel     Additional Comments: Cm received message that pt is approved for [facility]  Cm requested [facility] van transport for 2:00 pm  Cm notified Farzanehjaguar Osborne of the approve  Cm also notified the , Alicia Jacobo and his RN case manager from [facility] at [phone]  Report to Phone: [phone]  Fax: [phone]  They were also provided with RN's number      Accepting Facility Name, [address] : [facility]  Receiving Facility/Agency Phone Number: Phone: [phone]  Facility/Agency Fax Number: Fax: [phone]

## 2023-03-31 NOTE — NURSING NOTE
Patient's personal shaving kit was found in room after discharge  Spoke with CM at [facility], someone from [facility] will come  on Monday  Made CM aware, left item under charge nurse desk

## 2023-03-31 NOTE — ASSESSMENT & PLAN NOTE
Malnutrition Findings:   Adult Malnutrition type: Chronic illness  Adult Degree of Malnutrition: Other severe protein calorie malnutrition                 Nutrition consulted         BMI Findings:  Adult BMI Classifications: Underweight < 18 5        Body mass index is 15 3 kg/m²

## 2023-03-31 NOTE — PLAN OF CARE
Problem: MOBILITY - ADULT  Goal: Maintain or return to baseline ADL function  Description: INTERVENTIONS:  -  Assess patient's ability to carry out ADLs; assess patient's baseline for ADL function and identify physical deficits which impact ability to perform ADLs (bathing, care of mouth/teeth, toileting, grooming, dressing, etc )  - Assess/evaluate cause of self-care deficits   - Assess range of motion  - Assess patient's mobility; develop plan if impaired  - Assess patient's need for assistive devices and provide as appropriate  - Encourage maximum independence but intervene and supervise when necessary  - Involve family in performance of ADLs  - Assess for home care needs following discharge   - Consider OT consult to assist with ADL evaluation and planning for discharge  - Provide patient education as appropriate  Outcome: Progressing  Goal: Maintains/Returns to pre admission functional level  Description: INTERVENTIONS:  - Perform BMAT or MOVE assessment daily    - Set and communicate daily mobility goal to care team and patient/family/caregiver  - Collaborate with rehabilitation services on mobility goals if consulted  - Perform Range of Motion self upper times a day    - Reposition patient every self upper   - Dangle patient parapalegic   - Stand patient  parapalegic   - Ambulate patient   parapalegic    - Out of bed to chair optional    - Out of bed for meals optional   - Out of bed for toileting  - Record patient progress and toleration of activity level   Outcome: Progressing

## 2023-03-31 NOTE — DISCHARGE INSTR - AVS FIRST PAGE
Take Cefdinir (antibiotic) through 4/3/23    Local wound care to your sacral and lower extremity wounds

## 2025-05-08 ENCOUNTER — APPOINTMENT (EMERGENCY)
Dept: RADIOLOGY | Facility: HOSPITAL | Age: 55
DRG: 871 | End: 2025-05-08
Payer: OTHER MISCELLANEOUS

## 2025-05-08 ENCOUNTER — HOSPITAL ENCOUNTER (INPATIENT)
Facility: HOSPITAL | Age: 55
LOS: 6 days | Discharge: HOME | DRG: 871 | End: 2025-05-14
Attending: EMERGENCY MEDICINE | Admitting: HOSPITALIST
Payer: OTHER MISCELLANEOUS

## 2025-05-08 DIAGNOSIS — L89.154 PRESSURE INJURY OF SACRAL REGION, STAGE 4 (CMS/HCC): ICD-10-CM

## 2025-05-08 DIAGNOSIS — R91.1 LEFT UPPER LOBE PULMONARY NODULE: ICD-10-CM

## 2025-05-08 DIAGNOSIS — N30.90 CYSTITIS: ICD-10-CM

## 2025-05-08 DIAGNOSIS — A41.9 SEPSIS, DUE TO UNSPECIFIED ORGANISM, UNSPECIFIED WHETHER ACUTE ORGAN DYSFUNCTION PRESENT (CMS/HCC): ICD-10-CM

## 2025-05-08 DIAGNOSIS — R16.0 HEPATOMEGALY: ICD-10-CM

## 2025-05-08 DIAGNOSIS — M86.60 CHRONIC OSTEOMYELITIS (CMS/HCC): ICD-10-CM

## 2025-05-08 DIAGNOSIS — R09.02 HYPOXIA: Primary | ICD-10-CM

## 2025-05-08 PROBLEM — E46 PROTEIN-CALORIE MALNUTRITION (CMS/HCC): Status: ACTIVE | Noted: 2025-05-08

## 2025-05-08 PROBLEM — D72.825 BANDEMIA: Status: ACTIVE | Noted: 2025-05-08

## 2025-05-08 PROBLEM — N12 PYELONEPHRITIS: Status: ACTIVE | Noted: 2025-05-08

## 2025-05-08 PROBLEM — R93.89 ABNORMAL FINDING ON CT SCAN: Status: ACTIVE | Noted: 2025-05-08

## 2025-05-08 PROBLEM — Z86.718 PERSONAL HISTORY OF DVT (DEEP VEIN THROMBOSIS): Status: ACTIVE | Noted: 2025-05-08

## 2025-05-08 PROBLEM — S31.000A SACRAL WOUND: Status: ACTIVE | Noted: 2025-05-08

## 2025-05-08 PROBLEM — D64.9 ANEMIA: Status: ACTIVE | Noted: 2025-05-08

## 2025-05-08 LAB
ALBUMIN SERPL-MCNC: 4.3 G/DL (ref 3.5–5.7)
ALP SERPL-CCNC: 85 IU/L (ref 34–125)
ALT SERPL-CCNC: 6 IU/L (ref 7–52)
AMORPH CRY #/AREA URNS HPF: ABNORMAL /HPF
ANION GAP SERPL CALC-SCNC: 12 MEQ/L (ref 3–15)
AST SERPL-CCNC: 10 IU/L (ref 13–39)
BACTERIA URNS QL MICRO: 2 /HPF
BASOPHILS # BLD: 0.05 K/UL (ref 0.01–0.1)
BASOPHILS NFR BLD: 0.3 %
BILIRUB SERPL-MCNC: 0.4 MG/DL (ref 0.3–1.2)
BILIRUB UR QL STRIP.AUTO: NEGATIVE MG/DL
BUN SERPL-MCNC: 18 MG/DL (ref 7–25)
CALCIUM SERPL-MCNC: 9.4 MG/DL (ref 8.6–10.3)
CHLORIDE SERPL-SCNC: 106 MEQ/L (ref 98–107)
CLARITY UR REFRACT.AUTO: ABNORMAL
CO2 SERPL-SCNC: 17 MEQ/L (ref 21–31)
COLOR UR AUTO: ABNORMAL
CREAT SERPL-MCNC: 1 MG/DL (ref 0.7–1.3)
DIFFERENTIAL METHOD BLD: ABNORMAL
EGFRCR SERPLBLD CKD-EPI 2021: >60 ML/MIN/1.73M*2
EOSINOPHIL # BLD: 0.05 K/UL (ref 0.04–0.54)
EOSINOPHIL NFR BLD: 0.3 %
ERYTHROCYTE [DISTWIDTH] IN BLOOD BY AUTOMATED COUNT: 15.9 % (ref 11.6–14.4)
GLUCOSE SERPL-MCNC: 101 MG/DL (ref 70–99)
GLUCOSE UR STRIP.AUTO-MCNC: NEGATIVE MG/DL
HCT VFR BLD AUTO: 35.2 % (ref 40.1–51)
HGB BLD-MCNC: 10.8 G/DL (ref 13.7–17.5)
HGB UR QL STRIP.AUTO: 1
HYALINE CASTS #/AREA URNS LPF: ABNORMAL /LPF
IMM GRANULOCYTES # BLD AUTO: 0.1 K/UL (ref 0–0.08)
IMM GRANULOCYTES NFR BLD AUTO: 0.5 %
KETONES UR STRIP.AUTO-MCNC: NEGATIVE MG/DL
LACTATE SERPL-SCNC: 0.7 MMOL/L (ref 0.4–2)
LEUKOCYTE ESTERASE UR QL STRIP.AUTO: 3
LYMPHOCYTES # BLD: 2.1 K/UL (ref 1.2–3.5)
LYMPHOCYTES NFR BLD: 10.9 %
MCH RBC QN AUTO: 28.7 PG (ref 28–33.2)
MCHC RBC AUTO-ENTMCNC: 30.7 G/DL (ref 32.2–36.5)
MCV RBC AUTO: 93.6 FL (ref 83–98)
MONOCYTES # BLD: 1.13 K/UL (ref 0.3–1)
MONOCYTES NFR BLD: 5.9 %
MUCOUS THREADS URNS QL MICRO: ABNORMAL /LPF
NEUTROPHILS # BLD: 15.87 K/UL (ref 1.7–7)
NEUTS SEG NFR BLD: 82.1 %
NITRITE UR QL STRIP.AUTO: POSITIVE
NRBC BLD-RTO: 0 %
PH UR STRIP.AUTO: 6 [PH]
PHOSPHATE SERPL-MCNC: 2.9 MG/DL (ref 2.4–4.7)
PLATELET # BLD AUTO: 576 K/UL (ref 150–350)
PMV BLD AUTO: 8.3 FL (ref 9.4–12.4)
POTASSIUM SERPL-SCNC: 3.6 MEQ/L (ref 3.5–5.1)
PROT SERPL-MCNC: 8.6 G/DL (ref 6–8.2)
PROT UR QL STRIP.AUTO: 1
RBC # BLD AUTO: 3.76 M/UL (ref 4.5–5.8)
RBC #/AREA URNS HPF: ABNORMAL /HPF
SODIUM SERPL-SCNC: 135 MEQ/L (ref 136–145)
SP GR UR REFRACT.AUTO: 1.02
SQUAMOUS URNS QL MICRO: 1 /HPF
TROPONIN I SERPL HS-MCNC: 3.1 PG/ML
UROBILINOGEN UR STRIP-ACNC: 0.2 EU/DL
WBC # BLD AUTO: 19.3 K/UL (ref 3.8–10.5)
WBC #/AREA URNS HPF: ABNORMAL /HPF

## 2025-05-08 PROCEDURE — 93005 ELECTROCARDIOGRAM TRACING: CPT | Performed by: EMERGENCY MEDICINE

## 2025-05-08 PROCEDURE — 25800000 HC PHARMACY IV SOLUTIONS: Performed by: HOSPITALIST

## 2025-05-08 PROCEDURE — 84484 ASSAY OF TROPONIN QUANT: CPT | Performed by: EMERGENCY MEDICINE

## 2025-05-08 PROCEDURE — 12000000 HC ROOM AND CARE MED/SURG

## 2025-05-08 PROCEDURE — 63600000 HC DRUGS/DETAIL CODE: Mod: JZ

## 2025-05-08 PROCEDURE — 71275 CT ANGIOGRAPHY CHEST: CPT

## 2025-05-08 PROCEDURE — 25800000 HC PHARMACY IV SOLUTIONS

## 2025-05-08 PROCEDURE — 99223 1ST HOSP IP/OBS HIGH 75: CPT | Performed by: HOSPITALIST

## 2025-05-08 PROCEDURE — 81001 URINALYSIS AUTO W/SCOPE: CPT | Performed by: EMERGENCY MEDICINE

## 2025-05-08 PROCEDURE — 99285 EMERGENCY DEPT VISIT HI MDM: CPT | Mod: 25

## 2025-05-08 PROCEDURE — 1124F ACP DISCUSS-NO DSCNMKR DOCD: CPT | Performed by: HOSPITALIST

## 2025-05-08 PROCEDURE — 71045 X-RAY EXAM CHEST 1 VIEW: CPT

## 2025-05-08 PROCEDURE — 84100 ASSAY OF PHOSPHORUS: CPT

## 2025-05-08 PROCEDURE — 74177 CT ABD & PELVIS W/CONTRAST: CPT

## 2025-05-08 PROCEDURE — 63700000 HC SELF-ADMINISTRABLE DRUG: Performed by: HOSPITALIST

## 2025-05-08 PROCEDURE — 80053 COMPREHEN METABOLIC PANEL: CPT | Performed by: EMERGENCY MEDICINE

## 2025-05-08 PROCEDURE — 85025 COMPLETE CBC W/AUTO DIFF WBC: CPT | Performed by: EMERGENCY MEDICINE

## 2025-05-08 PROCEDURE — 63700000 HC SELF-ADMINISTRABLE DRUG

## 2025-05-08 PROCEDURE — 87040 BLOOD CULTURE FOR BACTERIA: CPT

## 2025-05-08 PROCEDURE — 87086 URINE CULTURE/COLONY COUNT: CPT | Performed by: EMERGENCY MEDICINE

## 2025-05-08 PROCEDURE — 63600105 HC IODINE BASED CONTRAST: Mod: JZ

## 2025-05-08 PROCEDURE — 36415 COLL VENOUS BLD VENIPUNCTURE: CPT | Performed by: EMERGENCY MEDICINE

## 2025-05-08 PROCEDURE — 83605 ASSAY OF LACTIC ACID: CPT

## 2025-05-08 RX ORDER — SODIUM CHLORIDE 9 MG/ML
INJECTION, SOLUTION INTRAVENOUS CONTINUOUS
Status: ACTIVE | OUTPATIENT
Start: 2025-05-08 | End: 2025-05-09

## 2025-05-08 RX ORDER — DEXTROSE 40 %
15-30 GEL (GRAM) ORAL AS NEEDED
Status: DISCONTINUED | OUTPATIENT
Start: 2025-05-08 | End: 2025-05-14 | Stop reason: HOSPADM

## 2025-05-08 RX ORDER — ENOXAPARIN SODIUM 100 MG/ML
40 INJECTION SUBCUTANEOUS
Status: DISCONTINUED | OUTPATIENT
Start: 2025-05-09 | End: 2025-05-08

## 2025-05-08 RX ORDER — ACETAMINOPHEN 325 MG/1
650 TABLET ORAL EVERY 4 HOURS PRN
Status: DISCONTINUED | OUTPATIENT
Start: 2025-05-08 | End: 2025-05-14 | Stop reason: HOSPADM

## 2025-05-08 RX ORDER — IOPAMIDOL 755 MG/ML
100 INJECTION, SOLUTION INTRAVASCULAR
Status: COMPLETED | OUTPATIENT
Start: 2025-05-08 | End: 2025-05-08

## 2025-05-08 RX ORDER — VANCOMYCIN HYDROCHLORIDE
25 ONCE
Status: DISCONTINUED | OUTPATIENT
Start: 2025-05-08 | End: 2025-05-08

## 2025-05-08 RX ORDER — ENOXAPARIN SODIUM 100 MG/ML
40 INJECTION SUBCUTANEOUS
Status: DISCONTINUED | OUTPATIENT
Start: 2025-05-08 | End: 2025-05-09

## 2025-05-08 RX ORDER — SODIUM CHLORIDE 9 MG/ML
INJECTION, SOLUTION INTRAVENOUS CONTINUOUS
Status: DISCONTINUED | OUTPATIENT
Start: 2025-05-08 | End: 2025-05-08

## 2025-05-08 RX ORDER — IBUPROFEN/PSEUDOEPHEDRINE HCL 200MG-30MG
3 TABLET ORAL ONCE
Status: COMPLETED | OUTPATIENT
Start: 2025-05-08 | End: 2025-05-08

## 2025-05-08 RX ORDER — DEXTROSE 50 % IN WATER (D50W) INTRAVENOUS SYRINGE
25 AS NEEDED
Status: DISCONTINUED | OUTPATIENT
Start: 2025-05-08 | End: 2025-05-14 | Stop reason: HOSPADM

## 2025-05-08 RX ORDER — ADHESIVE BANDAGE 7/8"
15-30 BANDAGE TOPICAL AS NEEDED
Status: DISCONTINUED | OUTPATIENT
Start: 2025-05-08 | End: 2025-05-14 | Stop reason: HOSPADM

## 2025-05-08 RX ADMIN — SODIUM CHLORIDE: 9 INJECTION, SOLUTION INTRAVENOUS at 22:28

## 2025-05-08 RX ADMIN — Medication 3 MG: at 22:37

## 2025-05-08 RX ADMIN — ENOXAPARIN SODIUM 40 MG: 40 INJECTION SUBCUTANEOUS at 22:38

## 2025-05-08 RX ADMIN — ACETAMINOPHEN 650 MG: 325 TABLET ORAL at 22:37

## 2025-05-08 RX ADMIN — IOPAMIDOL 100 ML: 755 INJECTION, SOLUTION INTRAVENOUS at 18:19

## 2025-05-08 RX ADMIN — SODIUM CHLORIDE 1000 ML: 9 INJECTION, SOLUTION INTRAVENOUS at 17:15

## 2025-05-08 RX ADMIN — CEFTAZIDIME 2 G: 2 INJECTION, POWDER, FOR SOLUTION INTRAVENOUS at 20:10

## 2025-05-08 RX ADMIN — SODIUM CHLORIDE: 9 INJECTION, SOLUTION INTRAVENOUS at 20:09

## 2025-05-08 ASSESSMENT — COGNITIVE AND FUNCTIONAL STATUS - GENERAL
STANDING UP FROM CHAIR USING ARMS: 1 - TOTAL
MOVING TO AND FROM BED TO CHAIR: 1 - TOTAL
WALKING IN HOSPITAL ROOM: 1 - TOTAL
CLIMB 3 TO 5 STEPS WITH RAILING: 1 - TOTAL

## 2025-05-08 NOTE — PROGRESS NOTES
"[facility] [employer]   [phone]    Your doctor has referred you for a   that requires the injection of an iodinated contrast material into your bloodstream. This iodinated contrast material, sometimes referred to as \"x-ray dye\" allows for better interpretation of the x-ray films or CT images and results in a more accurate interpretation of the examination.     Without the use of iodinated contrast (x-ray dye), the examination may be less informative and result in a suboptimal examination, and possibly a delay in diagnosis and, if needed, treatment.     The iodinated contrast material is given through a small needle or catheter placed into a vein, usually on the inside of the elbow, on the back of hand, or in a vein in the foot or lower leg.    The most common, though still rare, potential reaction to an intravenous contrast injection is an allergic-like reaction. Most allergic-like reactions are mild, though a small subset of people can have more severe reactions. Mild reactions include mild / scattered hives, itching, scratchy throat, sneezing and nasal congestion. More severe reactions include facial swelling, severe difficulty breathing, wheezing and anaphylactic shock. Those with a prior history allergic-like reaction to the same class of contrast media (such as CT contrast or MRI contrast) are at the highest risk for an allergic reaction.     If you believe you had an allergic reaction to contrast in the past, please let our staff know. We can determine if this increases your risk for a future reaction and provide steps to decrease the risk. This may delay your examination, but it decreases the risk of having a new and possibly more severe reaction to the contrast injection.    People with a history of prior allergic reactions to other substances (such as unrelated medications and food) and patients with a history of asthma have slightly increased risk for an allergic reaction from contrast material when " compared with the general population, but do not require to be pretreated prior to a contrast injection.    You should notify the physician, nurse or technologist if you have ever had any of these conditions or if you have any questions.

## 2025-05-08 NOTE — ED PROVIDER NOTES
Emergency Medicine Note  HPI   HISTORY OF PRESENT ILLNESS     Patient is a 54-year-old, paraplegic male s/p left BKA, colostomy and chronic condom catheter presenting to the emergency department for evaluation of x 6 months of UTI-like symptoms.  Patient states that he has been caring for his ill mother and put his health on the back burner.  He has been managing his symptoms with OTC Azo and cranberry pills.  For the past x 5 days, patient has been experiencing some lightheadedness and nausea/vomiting prompting further evaluation in the emergency department.  Admits to fever.      History provided by:  Patient        Patient History   PAST HISTORY     Reviewed from Nursing Triage:       History reviewed. No pertinent past medical history.    No past surgical history on file.    No family history on file.           Review of Systems   REVIEW OF SYSTEMS     Review of Systems   Constitutional:  Positive for chills and fever.   Respiratory:  Negative for cough and shortness of breath.    Cardiovascular:  Negative for chest pain.   Gastrointestinal:  Positive for abdominal pain, nausea and vomiting.   Genitourinary:  Positive for dysuria, frequency, hematuria and urgency. Negative for flank pain.   Skin:  Positive for wound.   Neurological:  Positive for light-headedness. Negative for dizziness, syncope and headaches.         VITALS     ED Vitals      Date/Time Temp Pulse Resp BP SpO2 [facility]   05/08/25 1943 -- 80 18 150/70 97 % EN   05/08/25 1847 -- 89 18 141/72 98 % CC   05/08/25 1704 -- -- -- -- 92 % CC   05/08/25 1647 -- -- -- -- 89 % GC   05/08/25 1646 37.3 °C (99.1 °F) 98 18 134/85 -- GC          Pulse Ox %: 89 % (05/08/25 1709)  Pulse Ox Interpretation: Low (05/08/25 1709)           Physical Exam   PHYSICAL EXAM     Physical Exam  Vitals and nursing note reviewed.   Constitutional:       Comments: Patient is chronically ill-appearing, but in no acute distress.  He answers questions appropriately and speaks in full  sentences.   HENT:      Head: Normocephalic and atraumatic.      Nose: Nose normal.      Mouth/Throat:      Mouth: Mucous membranes are moist.      Pharynx: Oropharynx is clear.   Eyes:      Conjunctiva/sclera: Conjunctivae normal.   Cardiovascular:      Rate and Rhythm: Normal rate and regular rhythm.   Pulmonary:      Effort: Pulmonary effort is normal.      Breath sounds: Normal breath sounds.   Abdominal:      General: Abdomen is flat.      Palpations: Abdomen is soft.      Tenderness: There is no abdominal tenderness.      Comments: Ostomy bag in place   Genitourinary:     Comments: Sacral wounds, please see attached photographs  ISMA Ricketts as chaperone  Musculoskeletal:      Cervical back: Normal range of motion and neck supple.      Comments: Left BKA   Skin:     General: Skin is warm and dry.      Capillary Refill: Capillary refill takes less than 2 seconds.   Neurological:      Mental Status: He is alert. Mental status is at baseline.                 PROCEDURES     Procedures     DATA     Results       Procedure Component Value Units Date/Time    Blood Culture Blood, Venous [[id number]] Collected: 05/08/25 1954    Specimen: Blood, Venous Updated: 05/08/25 1957    Urinalysis with Reflex Culture [[id number]]  (Abnormal) Collected: 05/08/25 1844    Specimen: Urine, Clean Catch Updated: 05/08/25 1906    Narrative:      The following orders were created for panel order Urinalysis with Reflex Culture.  Procedure                               Abnormality         Status                     ---------                               -----------         ------                     UA Reflex to Culture (Ma...[[id number]]  Abnormal            Final result               UA Microscopic[[id number]]               Abnormal            Final result                 Please view results for these tests on the individual orders.    UA Reflex to Culture (Macroscopic) [[id number]]  (Abnormal) Collected: 05/08/25 1844    Specimen: Urine,  Clean Catch Updated: 05/08/25 1906     Color, Urine Brown     Clarity, Urine Turbid     Specific Gravity, Urine 1.023     pH, Urine 6.0     Leukocyte Esterase +3     Nitrite, Urine Positive     Protein, Urine +1     Glucose, Urine Negative mg/dL      Ketones, Urine Negative mg/dL      Urobilinogen, Urine 0.2 EU/dL      Bilirubin, Urine Negative mg/dL      Blood, Urine +1     Comment: The sensitivity of the occult blood test is equivalent to approximately 4 intact RBC/HPF.       UA Microscopic [[id number]]  (Abnormal) Collected: 05/08/25 1844    Specimen: Urine, Clean Catch Updated: 05/08/25 1906     RBC, Urine 20 TO 35 /HPF      WBC, Urine Too Numerous To Count /HPF      Squamous Epithelial +1 /hpf      Hyaline Cast None Seen /lpf      Bacteria, Urine +2 /HPF      AMORPHOUS CRYSTALS Rare /hpf      Mucus Rare /LPF     HS Troponin I (with 2 hour reflex) [[id number]]  (Normal) Collected: 05/08/25 1652    Specimen: Blood, Venous Updated: 05/08/25 1749     High Sens Troponin I 3.1 pg/mL     Comprehensive metabolic panel [[id number]]  (Abnormal) Collected: 05/08/25 1652    Specimen: Blood, Venous Updated: 05/08/25 1745     Sodium 135 mEQ/L      Potassium 3.6 mEQ/L      Comment: Results obtained on plasma. Plasma Potassium values may be up to 0.4 mEQ/L less than serum values. The differences may be greater for patients with high platelet or white cell counts.        Chloride 106 mEQ/L      CO2 17 mEQ/L      BUN 18 mg/dL      Creatinine 1.0 mg/dL      Glucose 101 mg/dL      Calcium 9.4 mg/dL      AST (SGOT) 10 IU/L      ALT (SGPT) 6 IU/L      Alkaline Phosphatase 85 IU/L      Total Protein 8.6 g/dL      Comment: Test performed on plasma which typically contains approximately 0.4 g/dL more protein than serum.        Albumin 4.3 g/dL      Bilirubin, Total 0.4 mg/dL      eGFR >60.0 mL/min/1.73m*2      Comment: Calculation based on the Chronic Kidney Disease Epidemiology Collaboration (CKD-EPI) equation refit without adjustment  for race.        Anion Gap 12 mEQ/L     Lactate, w/ reflex repeat if > 2.0 [[id number]]  (Normal) Collected: 05/08/25 1715    Specimen: Blood, Venous Updated: 05/08/25 1727     Lactate 0.7 mmol/L     Blood Culture Blood, Venous [[id number]] Collected: 05/08/25 1715    Specimen: Blood, Venous Updated: 05/08/25 1725    CBC and differential [[id number]]  (Abnormal) Collected: 05/08/25 1652    Specimen: Blood, Venous Updated: 05/08/25 1715     WBC 19.30 K/uL      RBC 3.76 M/uL      Hemoglobin 10.8 g/dL      Hematocrit 35.2 %      MCV 93.6 fL      MCH 28.7 pg      MCHC 30.7 g/dL      RDW 15.9 %      Platelets 576 K/uL      MPV 8.3 fL      Differential Type Auto     nRBC 0.0 %      Immature Granulocytes 0.5 %      Neutrophils 82.1 %      Lymphocytes 10.9 %      Monocytes 5.9 %      Eosinophils 0.3 %      Basophils 0.3 %      Immature Granulocytes, Absolute 0.10 K/uL      Neutrophils, Absolute 15.87 K/uL      Lymphocytes, Absolute 2.10 K/uL      Monocytes, Absolute 1.13 K/uL      Eosinophils, Absolute 0.05 K/uL      Basophils, Absolute 0.05 K/uL             Imaging Results              CT ANGIOGRAPHY CHEST PULMONARY EMBOLISM WITH IV CONTRAST (Final result)  Result time 05/08/25 18:43:06      Final result                   Impression:    IMPRESSION:    1.  No evidence of pulmonary embolism.  2.  No acute pulmonary abnormality. Mild centrilobular emphysema. Mild to  moderate paraseptal emphysema. 3-4 mm nodule in left upper lobe, for which  follow-up imaging is recommended as per patient risk factors and current  Fleischner Society guidelines.  3.  See additional findings above.    CC: Lung Nodule Navigator               Narrative:    CLINICAL HISTORY:  54-year-old male with dyspnea on exertion.    COMPARISON:  Radiograph of chest obtained earlier on the same date.    TECHNIQUE:  CT angiography of the chest is performed following the uneventful  administration of intravenous contrast according to the pulmonary  embolism  protocol. Coronal and sagittal reconstructions are obtained. Three-dimensional  MIP and/or volume rendered image reconstruction is performed and reviewed. One  or more dose reduction techniques (e.g., automated exposure control, adjustment  of the mA and/or kV according to the patient size, use of iterative  reconstruction technique) are utilized for this examination.    CONTRAST:  100 mL Isovue-370 IV contrast    COMMENT:    Pulmonary Arteries:  Pulmonary arteries are adequately opacified by intravenous  contrast, although bolus timing results in similar opacification of pulmonary  arteries, pulmonary veins and thoracic aorta. No filling defects are seen from  the level of the main pulmonary artery to the level of the proximal subsegmental  branches to suggest pulmonary embolism.    RV/LV Ratio:  Not applicable.    Aorta and Great Vessels:  No aortic aneurysm or evident dissection. Mild  atherosclerotic changes consist of noncalcified plaque. Left vertebral artery  arises separately from aortic arch, a normal anatomic variant.    Heart:  Normal cardiac size. No pericardial effusion.    Lungs:  Very mild pleural/parenchymal scarring in lung apices. Mild  centrilobular emphysema. Mild to moderate paraseptal emphysema. No acute or  lobar consolidation. Multifocal, mild scarring and/or subsegmental atelectasis  in basilar lower lobes. Solid nodule in apical left upper lobe measures 3-4 mm  on image 27 of axial lung 1.25 mm series. Central tracheobronchial tree is  patent with probable, small amount of adherent secretions in tracheal lumen near  haley.    Pleura:  No pleural effusion or pneumothorax.    Mary [place], Mediastinum and Lymph Nodes:  No suspect mass or lymphadenopathy. 3 mm  calcification in right lobe of thyroid gland on image 18 of axial standard 2.5  series is noted.    Visualized Upper Abdomen:  No suspicious findings.    Osseous Structures:  Mild thoracic kyphosis. Degenerative changes.  Postoperative  changes of spinal fusion utilizing posterior rods and pedicle screws which  extend from T11 to L3 with evidence of discectomy and partial osseous fusion at  T12-L1. Healed fractures of multiple right ribs.    Additional Findings:  Paucity of subcutaneous fat.                                       CT ABDOMEN PELVIS WITH IV CONTRAST (Final result)  Result time 05/08/25 19:04:12      Final result                   Impression:    IMPRESSION:    1.  Moderate to severe left renal atrophy with multifocal cortical scarring.  Compensatory hypertrophy of right kidney. Heterogeneous or striated nephrograms  could represent acute pyelonephritis, acute tubular necrosis or other pathology.  2.  Mild to moderate bladder wall thickening and mucosal hyperenhancement  suggest cystitis or chronic bladder outlet obstruction. Small bladder  diverticulum. Correlation with urinalysis is recommended.  3.  Hepatomegaly. Probable pseudolesion in medial segment of left lobe.  Nonocclusive thrombus in branches of right hepatic vein.  4.  Status post left hemicolectomy with left abdominal colostomy and fat and  bowel-containing parastomal hernia, as above.  5.  Left adrenal nodule, possibly adenoma but not characterized.  6.  Bilateral common and external iliac lymphadenopathy could be reactive or  metastatic. Correlation with patient's clinical history is required.  7.  High-grade decubitus ulcers in bilateral buttocks with evidence of chronic  osteomyelitis in bilateral ischial tuberosities and inferior pubic rami.  8.  See additional findings above.    Direct comparison with abdominopelvic CT from outside institution, if available,  is recommended to confirm stability of multiple findings. Otherwise, abdominal  MRI with and without contrast is recommended on a nonemergent outpatient basis  for further evaluation.               Narrative:    CLINICAL HISTORY:  54-year-old male with nausea and vomiting.    COMPARISON:   None.    TECHNIQUE:  Axial CT images of the abdomen and pelvis are obtained following the  uneventful administration of intravenous contrast. Oral contrast is not  administered. Coronal and sagittal images are reformatted. One or more dose  reduction techniques (e.g., automated exposure control, adjustment of the mA  and/or kV according to the patient size, use of iterative reconstruction  technique) are utilized for this examination.    CONTRAST:  100 mL Isovue-370 IV contrast    COMMENT:    Lung Bases:  Emphysematous changes.    Liver:  Enlarged. Ovoid, masslike area of decreased attenuation in inferior  aspect of medial segment of left lobe measures approximately 22 x 42 mm on image  75 of axial standard 2.5 series, becomes less conspicuous on delayed images and  does not definitely exert mass effect on traversing vessels, favoring a  pseudolesion due to localized steatosis. Small, elongated area of  hypoattenuation in branches of right hepatic vein on images 37-48 is suspicious  for nonocclusive, age-indeterminate thrombus.    Gallbladder and Biliary Tree:  No suspicious findings.    Spleen:  Subcentimeter, hypoattenuating lesion in spleen on image 42 is too  small to accurately characterize by CT but almost certainly represents a benign  lesion such as a cyst.    Pancreas:  No suspicious findings.    Adrenal Glands:  Heterogeneously hypoattenuating left adrenal nodule measures  approximately 25 x 13 mm on image 21 and likely represents an adenoma although  not characterized as such on contrast-enhanced CT.    Kidneys:  Moderate to severe atrophy of left kidney with multifocal cortical  scarring and heterogeneous nephrogram. Compensatory hypertrophy of right kidney  with striated nephrogram which may be accentuated by beam hardening artifact  related to large amount of metallic spinal hardware but is suspicious for  pathologic process such as acute pyelonephritis. Subcentimeter, hypoattenuating  lesion in medial  lower pole of right kidney on image 46 is too small to  characterize by CT but likely represents a cyst. Both kidneys excrete contrast  on delayed images.    Pelvic Viscera:  Mild to moderate, circumferential thickening of bladder wall  with mild trabeculation and mild mucosal hyperenhancement suggest cystitis or  chronic bladder outlet obstruction. Diverticulum arising from right  posterolateral aspect of bladder measures 30 x 19 mm on image 105.    Bowel:  No bowel obstruction. Postoperative changes of left hemicolectomy with  left lower abdominal colostomy. Segments of small and large bowel as well as fat  appear to protrude through ostomy, external to abdominal wall and should be  correlated clinically.    Peritoneum, Retroperitoneum and Lymph Nodes:  No definite acute inflammatory  process, pneumoperitoneum or ascites. Bilateral common and external iliac  lymphadenopathy measures up to 17 x 24 mm on the right on image 118. Several  prominent but not pathologically enlarged inguinal lymph nodes are noted  bilaterally. Aortoiliac atherosclerosis consists of calcified and noncalcified  plaque. Diminutive inferior vena cava is poorly visualized, suggesting chronic  thrombosis, and contains an IVC filter.    Osseous Structures:  Degenerative changes. Postoperative changes of spinal  fusion utilizing posterior rods and pedicle screws which extend from T11 to L3  with evidence of discectomy and partial osseous fusion at T12-L1 as well as  multilevel laminectomy. Probable bone graft donor site in left iliac bone.  Intramedullary noemí in right femur is partially imaged with abundant heterotopic  ossification noted adjacent to greater and lesser trochanters and proximal shaft  of femur. See below.    Additional Findings:  High-grade decubitus ulcers in bilateral buttocks expose  portions of ischial tuberosities and inferior pubic rami with evidence of  chronic osteomyelitis and partial bony destruction bilaterally.  Diffuse fatty  atrophy of musculature in hips and proximal lower extremities, possibly due to  disuse.                                       X-RAY CHEST 1 VIEW (Final result)  Result time 05/08/25 18:41:10   Procedure changed from X-RAY CHEST 2 VIEWS     Final result                   Impression:    IMPRESSION:    No acute cardiopulmonary abnormality on frontal view of chest.               Narrative:    CLINICAL HISTORY:  54-year-old male with shortness of breath and hypoxia.    COMPARISON:  None.    TECHNIQUE:  A portable AP erect view of the chest is obtained.    COMMENT:    The heart is normal in size. Mediastinal contours are unremarkable. No  pneumothorax or pleural effusion is seen. The lungs are well-inflated and appear  grossly clear. Spinal fusion hardware extending from the lower thoracic spine  into the lumbar spine is partially imaged.                                      ECG 12 lead          Scoring tools                             Sepsis Documentation    Sepsis assessment performed: 5/8/2025 10:25 PM[ST1.1]  SIRS Criteria: tachycardia, leukocytosis[ST1.1]     Is the patient's history suggestive of an infection?: Yes[ST1.1]  Suspected source of infection: urinary tract infection[ST1.1]  Suspected type of infection: bacterial[ST1.1]        Sepsis reassessment performed: 5/8/2025 10:26 PM[ST1.2]       Attribution       ST1.1 Melany Olson MD 05/08/25 22:25    ST1.2 Melany Olson MD 05/08/25 22:26               ED Course & MDM   MDM / ED COURSE / CLINICAL IMPRESSION / DISPO     Medical Decision Making  Problems Addressed:  Chronic osteomyelitis (CMS/HCC): chronic illness or injury  Cystitis: acute illness or injury  Hepatomegaly: undiagnosed new problem with uncertain prognosis  Hypoxia: acute illness or injury  Left upper lobe pulmonary nodule: undiagnosed new problem with uncertain prognosis  Pressure injury of sacral region, stage 4 (CMS/HCC): chronic illness or injury  Sepsis, due to  unspecified organism, unspecified whether acute organ dysfunction present (CMS/HCC): acute illness or injury    Amount and/or Complexity of Data Reviewed  External Data Reviewed: labs, radiology, ECG and notes.  Labs: ordered. Decision-making details documented in ED Course.  Radiology: ordered. Decision-making details documented in ED Course.  ECG/medicine tests: ordered.    Risk  Prescription drug management.  Decision regarding hospitalization.        ED Course as of 05/09/25 0126   Thu May 08, 2025   1709 X 5 days of fever, vomiting, dizzy, lightheaded [AS]   1718 CBC and differential(!)  Leukocytosis, anemia  [AS]   1732 Lactate: 0.7  WNL [AS]   1734 Lactate: 0.7 [HL]   1734 Hemoglobin(!): 10.8  9.9 in 2023 [HL]   1746 Comprehensive metabolic panel(!)  WNL [AS]   1749 High Sens Troponin I: 3.1  Sx ongoing x 5 days  Will dc repeat [AS]   1844 X-RAY CHEST 1 VIEW  The heart is normal in size. Mediastinal contours are unremarkable. No  pneumothorax or pleural effusion is seen. The lungs are well-inflated and appear  grossly clear. Spinal fusion hardware extending from the lower thoracic spine  into the lumbar spine is partially imaged.     --  IMPRESSION:     No acute cardiopulmonary abnormality on frontal view of chest.      Exam Ended: 05/08/25 17:49     [HL]   1844 CT ANGIOGRAPHY CHEST PULMONARY EMBOLISM WITH IV CONTRAST  IMPRESSION:     1.  No evidence of pulmonary embolism.  2.  No acute pulmonary abnormality. Mild centrilobular emphysema. Mild to  moderate paraseptal emphysema. 3-4 mm nodule in left upper lobe, for which  follow-up imaging is recommended as per patient risk factors and current  Fleischner Society guidelines.  3.  See additional findings above.     CC: Lung Nodule Navigator      Exam Ended: 05/08/25 18:18     [HL]   1845 Will sign out final results with urinalysis and CT abdomen pelvis to Dr Soriano    Patient will need to be hospitalized for hypoxia probable antibiotics with history of sacral  osteomyelitis along with complicated urinary tract infection    Critical care time 25 minutes [HL]   1855 CT ANGIOGRAPHY CHEST PULMONARY EMBOLISM WITH IV CONTRAST  IMPRESSION:     1.  No evidence of pulmonary embolism.  2.  No acute pulmonary abnormality. Mild centrilobular emphysema. Mild to  moderate paraseptal emphysema. 3-4 mm nodule in left upper lobe, for which  follow-up imaging is recommended as per patient risk factors and current  Fleischner Society guidelines.  3.  See additional findings above. [AS]   1907 Clarity, Urine(!): Turbid [AS]   1907 Color, Urine(!): Brown [AS]   1907 Leukocyte Esterase(!): +3 [AS]   1907 Nitrite, Urine(!): Positive [AS]   1907 WBC, Urine(!): Too Numerous To Count [AS]   1908 CT ABDOMEN PELVIS WITH IV CONTRAST  IMPRESSION:     1.  Moderate to severe left renal atrophy with multifocal cortical scarring.  Compensatory hypertrophy of right kidney. Heterogeneous or striated nephrograms  could represent acute pyelonephritis, acute tubular necrosis or other pathology.  2.  Mild to moderate bladder wall thickening and mucosal hyperenhancement  suggest cystitis or chronic bladder outlet obstruction. Small bladder  diverticulum. Correlation with urinalysis is recommended.  3.  Hepatomegaly. Probable pseudolesion in medial segment of left lobe.  Nonocclusive thrombus in branches of right hepatic vein.  4.  Status post left hemicolectomy with left abdominal colostomy and fat and  bowel-containing parastomal hernia, as above.  5.  Left adrenal nodule, possibly adenoma but not characterized.  6.  Bilateral common and external iliac lymphadenopathy could be reactive or  metastatic. Correlation with patient's clinical history is required.  7.  High-grade decubitus ulcers in bilateral buttocks with evidence of chronic  osteomyelitis in bilateral ischial tuberosities and inferior pubic rami.  8.  See additional findings above. [AS]   1937 Updated patient with results   Fortaz/Vancomycin  ordered  Pictures of wound placed in chart  [facility] accepted care of the patient  [AS]      ED Course User Index  [AS] Teresa Richard PA C  [HL] Daquan Tavarez MD     Clinical Impression      Left upper lobe pulmonary nodule   Hypoxia   Cystitis   Hepatomegaly   Pressure injury of sacral region, stage 4 (CMS/HCC)   Chronic osteomyelitis (CMS/HCC)   Sepsis, due to unspecified organism, unspecified whether acute organ dysfunction present (CMS/HCC)     _________________       ED Disposition   Admit / Observation                       Teresa Richard PA C  05/09/25 0126

## 2025-05-08 NOTE — ED ATTESTATION NOTE
"I have personally seen and examined Mehnaz Bronson. I personally performed the key components of the encounter and provided a substantive portion of the care and medical decision making for this patient.    I reviewed and agree with physician assistant’s assessment and plan of care, with any exceptions as documented below.     My focused history, examination, assessment, and plan of care of Mehnaz Bronson is as follows:    Brief History:   54-year-old male presented with \"fever UTI\" for months.  Has been taking over-the-counter Azo's with discoloration of his urine but no changes to his symptoms.  Patient's has a history of sacral osteomyelitis also cystitis/pyelonephritis for which she was hospitalized and discharged from [facility] in 2023 following a course of cefepime/vancomycin and oral cefdinir  Focused Physical Exam:   Vitals:    05/08/25 1704   BP:    Pulse:    Resp:    Temp:    SpO2: 92%   Awake and alert good eye contact pleasant cooperative.  Mild pressured rapid speech but no respiratory distress normal facial asymmetry.  Pale complexion but normal conjunctiva.  Regular rate and rhythm soft nontender nondistended abdomen.  Clear orients urine in the condom catheter collected in the leg bag  89% O2 saturation on room air  Assessment / Plan / MDM:  Clinical history and exam concerning for but not limited to:  Questionable infection  Possible PE       Daquan Tavarez MD  05/08/25 1831       Daquan Tavarez MD  05/08/25 1833    "

## 2025-05-09 PROBLEM — R93.89 ABNORMAL CT SCAN: Status: ACTIVE | Noted: 2025-05-09

## 2025-05-09 PROBLEM — E87.8 ELECTROLYTE ABNORMALITY: Status: ACTIVE | Noted: 2025-05-09

## 2025-05-09 LAB
ANION GAP SERPL CALC-SCNC: 9 MEQ/L (ref 3–15)
ATRIAL RATE: 95
BACTERIA UR CULT: NORMAL
BACTERIA UR CULT: NORMAL
BASOPHILS # BLD: 0.05 K/UL (ref 0.01–0.1)
BASOPHILS NFR BLD: 0.4 %
BUN SERPL-MCNC: 13 MG/DL (ref 7–25)
CALCIUM SERPL-MCNC: 7.9 MG/DL (ref 8.6–10.3)
CHLORIDE SERPL-SCNC: 110 MEQ/L (ref 98–107)
CO2 SERPL-SCNC: 16 MEQ/L (ref 21–31)
CREAT SERPL-MCNC: 0.9 MG/DL (ref 0.7–1.3)
DIFFERENTIAL METHOD BLD: ABNORMAL
EGFRCR SERPLBLD CKD-EPI 2021: >60 ML/MIN/1.73M*2
EOSINOPHIL # BLD: 0.12 K/UL (ref 0.04–0.54)
EOSINOPHIL NFR BLD: 1 %
ERYTHROCYTE [DISTWIDTH] IN BLOOD BY AUTOMATED COUNT: 15.9 % (ref 11.6–14.4)
FERRITIN SERPL-MCNC: 110 NG/ML (ref 24–250)
FOLATE SERPL-MCNC: 12.3 NG/ML
GLUCOSE SERPL-MCNC: 85 MG/DL (ref 70–99)
HCT VFR BLD AUTO: 28.5 % (ref 40.1–51)
HGB BLD-MCNC: 8.9 G/DL (ref 13.7–17.5)
IMM GRANULOCYTES # BLD AUTO: 0.08 K/UL (ref 0–0.08)
IMM GRANULOCYTES NFR BLD AUTO: 0.6 %
IRON SATN MFR SERPL: 22 % (ref 15–45)
IRON SERPL-MCNC: 44 UG/DL (ref 35–150)
LYMPHOCYTES # BLD: 1.97 K/UL (ref 1.2–3.5)
LYMPHOCYTES NFR BLD: 16 %
MAGNESIUM SERPL-MCNC: 1.7 MG/DL (ref 1.8–2.5)
MCH RBC QN AUTO: 29.2 PG (ref 28–33.2)
MCHC RBC AUTO-ENTMCNC: 31.2 G/DL (ref 32.2–36.5)
MCV RBC AUTO: 93.4 FL (ref 83–98)
MONOCYTES # BLD: 0.89 K/UL (ref 0.3–1)
MONOCYTES NFR BLD: 7.2 %
NEUTROPHILS # BLD: 9.23 K/UL (ref 1.7–7)
NEUTS SEG NFR BLD: 74.8 %
NRBC BLD-RTO: 0 %
P AXIS: 83
PLATELET # BLD AUTO: 451 K/UL (ref 150–350)
PMV BLD AUTO: 8.4 FL (ref 9.4–12.4)
POTASSIUM SERPL-SCNC: 3.4 MEQ/L (ref 3.5–5.1)
PR INTERVAL: 124
QRS DURATION: 78
QT INTERVAL: 358
QTC CALCULATION(BAZETT): 449
R AXIS: 44
RBC # BLD AUTO: 3.05 M/UL (ref 4.5–5.8)
RETICS #: 0.05 M/UL (ref 0–0.12)
RETICS/RBC NFR: 1.64 % (ref 0.6–2.8)
SODIUM SERPL-SCNC: 135 MEQ/L (ref 136–145)
T WAVE AXIS: 69
TIBC SERPL-MCNC: 204 UG/DL (ref 270–460)
UIBC SERPL-MCNC: 160 UG/DL (ref 180–360)
VENTRICULAR RATE: 95
VIT B12 SERPL-MCNC: 384 PG/ML (ref 180–914)
WBC # BLD AUTO: 12.34 K/UL (ref 3.8–10.5)

## 2025-05-09 PROCEDURE — 63700000 HC SELF-ADMINISTRABLE DRUG: Performed by: HOSPITALIST

## 2025-05-09 PROCEDURE — 80048 BASIC METABOLIC PNL TOTAL CA: CPT

## 2025-05-09 PROCEDURE — 82607 VITAMIN B-12: CPT | Performed by: PHYSICIAN ASSISTANT

## 2025-05-09 PROCEDURE — 85045 AUTOMATED RETICULOCYTE COUNT: CPT | Performed by: PHYSICIAN ASSISTANT

## 2025-05-09 PROCEDURE — 25800000 HC PHARMACY IV SOLUTIONS: Performed by: NURSE PRACTITIONER

## 2025-05-09 PROCEDURE — 25800000 HC PHARMACY IV SOLUTIONS: Performed by: HOSPITALIST

## 2025-05-09 PROCEDURE — 63600000 HC DRUGS/DETAIL CODE: Mod: JZ | Performed by: HOSPITALIST

## 2025-05-09 PROCEDURE — 82746 ASSAY OF FOLIC ACID SERUM: CPT | Performed by: PHYSICIAN ASSISTANT

## 2025-05-09 PROCEDURE — 63600000 HC DRUGS/DETAIL CODE: Mod: JZ | Performed by: STUDENT IN AN ORGANIZED HEALTH CARE EDUCATION/TRAINING PROGRAM

## 2025-05-09 PROCEDURE — 83550 IRON BINDING TEST: CPT

## 2025-05-09 PROCEDURE — 93010 ELECTROCARDIOGRAM REPORT: CPT | Performed by: INTERNAL MEDICINE

## 2025-05-09 PROCEDURE — 63700000 HC SELF-ADMINISTRABLE DRUG

## 2025-05-09 PROCEDURE — 83735 ASSAY OF MAGNESIUM: CPT

## 2025-05-09 PROCEDURE — 36415 COLL VENOUS BLD VENIPUNCTURE: CPT

## 2025-05-09 PROCEDURE — 63600000 HC DRUGS/DETAIL CODE: Performed by: PHYSICIAN ASSISTANT

## 2025-05-09 PROCEDURE — 85025 COMPLETE CBC W/AUTO DIFF WBC: CPT

## 2025-05-09 PROCEDURE — 82728 ASSAY OF FERRITIN: CPT

## 2025-05-09 PROCEDURE — 21400000 HC ROOM AND CARE CCU/INTERMEDIATE

## 2025-05-09 PROCEDURE — 63700000 HC SELF-ADMINISTRABLE DRUG: Performed by: NURSE PRACTITIONER

## 2025-05-09 PROCEDURE — 25800000 HC PHARMACY IV SOLUTIONS: Performed by: STUDENT IN AN ORGANIZED HEALTH CARE EDUCATION/TRAINING PROGRAM

## 2025-05-09 PROCEDURE — 99233 SBSQ HOSP IP/OBS HIGH 50: CPT | Performed by: HOSPITALIST

## 2025-05-09 RX ORDER — OXYCODONE HYDROCHLORIDE 5 MG/1
5 TABLET ORAL EVERY 4 HOURS PRN
Refills: 0 | Status: DISCONTINUED | OUTPATIENT
Start: 2025-05-09 | End: 2025-05-14 | Stop reason: HOSPADM

## 2025-05-09 RX ORDER — SODIUM CHLORIDE 9 MG/ML
INJECTION, SOLUTION INTRAVENOUS CONTINUOUS
Status: DISCONTINUED | OUTPATIENT
Start: 2025-05-09 | End: 2025-05-11

## 2025-05-09 RX ORDER — TRAZODONE HYDROCHLORIDE 50 MG/1
25 TABLET ORAL ONCE
Status: COMPLETED | OUTPATIENT
Start: 2025-05-09 | End: 2025-05-09

## 2025-05-09 RX ORDER — ENOXAPARIN SODIUM 100 MG/ML
1 INJECTION SUBCUTANEOUS
Status: DISCONTINUED | OUTPATIENT
Start: 2025-05-09 | End: 2025-05-13

## 2025-05-09 RX ORDER — POTASSIUM CHLORIDE 20 MEQ/1
40 TABLET, EXTENDED RELEASE ORAL ONCE
Status: COMPLETED | OUTPATIENT
Start: 2025-05-09 | End: 2025-05-09

## 2025-05-09 RX ORDER — OXYCODONE HYDROCHLORIDE 5 MG/1
10 TABLET ORAL EVERY 4 HOURS PRN
Refills: 0 | Status: DISCONTINUED | OUTPATIENT
Start: 2025-05-09 | End: 2025-05-14 | Stop reason: HOSPADM

## 2025-05-09 RX ADMIN — OXYCODONE HYDROCHLORIDE 5 MG: 5 TABLET ORAL at 22:57

## 2025-05-09 RX ADMIN — COLLAGENASE SANTYL 1 APPLICATION: 250 OINTMENT TOPICAL at 20:52

## 2025-05-09 RX ADMIN — ENOXAPARIN SODIUM 50 MG: 100 INJECTION SUBCUTANEOUS at 22:59

## 2025-05-09 RX ADMIN — SODIUM CHLORIDE: 9 INJECTION, SOLUTION INTRAVENOUS at 13:38

## 2025-05-09 RX ADMIN — OXYCODONE HYDROCHLORIDE 5 MG: 5 TABLET ORAL at 13:36

## 2025-05-09 RX ADMIN — OXYCODONE HYDROCHLORIDE 5 MG: 5 TABLET ORAL at 09:17

## 2025-05-09 RX ADMIN — POTASSIUM CHLORIDE 40 MEQ: 1500 TABLET, EXTENDED RELEASE ORAL at 09:00

## 2025-05-09 RX ADMIN — CEFTAZIDIME 2 G: 2 INJECTION, POWDER, FOR SOLUTION INTRAVENOUS at 22:57

## 2025-05-09 RX ADMIN — TRAZODONE HYDROCHLORIDE 25 MG: 50 TABLET ORAL at 22:57

## 2025-05-09 RX ADMIN — OXYCODONE HYDROCHLORIDE 5 MG: 5 TABLET ORAL at 18:01

## 2025-05-09 RX ADMIN — ACETAMINOPHEN 650 MG: 325 TABLET ORAL at 05:22

## 2025-05-09 RX ADMIN — MAGNESIUM SULFATE HEPTAHYDRATE 2 G: 40 INJECTION, SOLUTION INTRAVENOUS at 09:00

## 2025-05-09 RX ADMIN — CEFTAZIDIME 2 G: 2 INJECTION, POWDER, FOR SOLUTION INTRAVENOUS at 14:26

## 2025-05-09 RX ADMIN — CEFTRIAXONE SODIUM 1 G: 1 INJECTION, POWDER, FOR SOLUTION INTRAMUSCULAR; INTRAVENOUS at 06:48

## 2025-05-09 RX ADMIN — SODIUM CHLORIDE: 9 INJECTION, SOLUTION INTRAVENOUS at 23:01

## 2025-05-09 RX ADMIN — ENOXAPARIN SODIUM 50 MG: 100 INJECTION SUBCUTANEOUS at 14:28

## 2025-05-09 ASSESSMENT — ENCOUNTER SYMPTOMS
FEVER: 1
FREQUENCY: 1
HEMATURIA: 1
HEADACHES: 0
FLANK PAIN: 0
WOUND: 1
VOMITING: 1
SHORTNESS OF BREATH: 0
LIGHT-HEADEDNESS: 1
COUGH: 0
ABDOMINAL PAIN: 1
CHILLS: 1
NAUSEA: 1
DYSURIA: 1
DIZZINESS: 0

## 2025-05-09 ASSESSMENT — COGNITIVE AND FUNCTIONAL STATUS - GENERAL
STANDING UP FROM CHAIR USING ARMS: 1 - TOTAL
WALKING IN HOSPITAL ROOM: 1 - TOTAL
CLIMB 3 TO 5 STEPS WITH RAILING: 1 - TOTAL
MOVING TO AND FROM BED TO CHAIR: 2 - A LOT
WALKING IN HOSPITAL ROOM: 1 - TOTAL
CLIMB 3 TO 5 STEPS WITH RAILING: 1 - TOTAL
MOVING TO AND FROM BED TO CHAIR: 2 - A LOT
STANDING UP FROM CHAIR USING ARMS: 1 - TOTAL

## 2025-05-09 NOTE — ASSESSMENT & PLAN NOTE
Hepatomegaly. Probable pseudolesion in medial segment of left lobe.  Nonocclusive thrombus in branches of right hepatic vein.    - hematology consulted

## 2025-05-09 NOTE — CONSULTS
Heme/Onc Consult       SUBJECTIVE     Mehnaz Bronson is a 54 y.o. male with a history of paraplegia status post L BKA, chronic sacral wound, indwelling Ryan catheter, colostomy, osteomyelitis of sacral ulcer  . Patient admitted for Hypoxia [R09.02]  Sepsis (CMS/HCC) [A41.9]  Leukocytosis, unspecified type [D72.829]  Left upper lobe pulmonary nodule [R91.1]. Mehnaz was seen in consultation at the request of Tex Waters MD for recommendations related to R hepatic vein clot     Mehnaz presents to [facility] on 5/8 for fever/ chills, nausea and back pain. He reports he has had these symptoms prior w/ UTIs and thought that was likely the case. He tried some OTC treatments w/o improvement. He also endorses some recent weight loss. Otherwise he denies chest pain, SOB, diarrhea, constipation, swelling, bleeding concerns.    He has a hx of IVC filter placement right after his traumatic BKA (crush injury at work ~20 years ago). Filter was placed ppx given surgeries, sedentary status BKA and w/ paraplegia. He thinks he may have had some LE extremity DVTs at some point, though cannot recall if was ever on any anticoagulation. No other personal VTE hx. No known family hx of VTE. He is a cigarette smoker, smokes 1/2 ppd but reports he is actively trying to quit.     On arrival to [facility] vital signs notable for temp 99.1F, otherwise stable .  Labs showed WBC 19.3k, Hgb 10.8, plts 576k.  UA + nitrites, +3 leuk esterase. Ucx pending.  He is also known to have some pressure ulcers and his heel and rear end.     CT C/A/P: No evidence of pulmonary embolism. Emphysema. Mild to moderate bladder wall thickening and mucosal hyperenhancement suggest cystitis or chronic bladder outlet obstruction.  Hepatomegaly. Probable pseudolesion in medial segment of left lobe. Nonocclusive thrombus in branches of right hepatic vein.    He was started on IV abx for UTI.     Outside records reviewed. Pertinent radiology results reviewed.  Subjective:     Julio Staley is a 17 y o  male who is brought in for this well child visit  History provided by: mother    Current Issues:  Current concerns: none  Well Child Assessment:  History was provided by the mother  Yulissa Reece lives with his mother, father and brother  Nutrition  Types of intake include cereals, cow's milk, eggs, fish, fruits, vegetables and meats (eats well, drinks water and milk)  Dental  The patient has a dental home  The patient brushes teeth regularly (not on weekends)  The patient does not floss regularly  Last dental exam was less than 6 months ago  Elimination  (No issues)   Behavioral  Disciplinary methods include consistency among caregivers  Sleep  Average sleep duration is 10 hours  The patient does not snore  There are no sleep problems  Safety  There is no smoking in the home  Home has working smoke alarms? yes  Home has working carbon monoxide alarms? yes  There is a gun in home (in a safe)  School  Current grade level is 7th  Current school district is [place]  There are no signs of learning disabilities (IEP for ADHD and executive function/dysgraphia)  Child is doing well in school  Screening  There are no risk factors for hearing loss  There are no risk factors for anemia  There are no risk factors for dyslipidemia  There are no risk factors for tuberculosis  There are no risk factors for vision problems  There are no risk factors related to diet  There are no risk factors at school  There are no risk factors for sexually transmitted infections  There are no risk factors related to alcohol  There are no risk factors related to relationships  There are no risk factors related to friends or family  There are no risk factors related to emotions  There are no risk factors related to drugs  There are no risk factors related to personal safety  There are no risk factors related to tobacco  There are no risk factors related to special circumstances     Social  The "caregiver enjoys the child  After school, the child is at home with a parent or home with a sibling  Sibling interactions are good  The child spends 1 hour in front of a screen (tv or computer) per day  The following portions of the patient's history were reviewed and updated as appropriate: allergies, current medications, past family history, past medical history, past social history, past surgical history and problem list           Objective:       Vitals:    04/24/23 1538   BP: 112/70   BP Location: Right arm   Patient Position: Sitting   Cuff Size: Child   Pulse: 76   Resp: 17   Temp: 98 6 °F (37 °C)   TempSrc: Temporal   SpO2: 98%   Weight: 40 7 kg (89 lb 12 8 oz)   Height: 4' 10\" (1 473 m)     Growth parameters are noted and are appropriate for age  Wt Readings from Last 1 Encounters:   04/24/23 40 7 kg (89 lb 12 8 oz) (28 %, Z= -0 58)*     * Growth percentiles are based on CDC (Boys, 2-20 Years) data  Ht Readings from Last 1 Encounters:   04/24/23 4' 10\" (1 473 m) (14 %, Z= -1 10)*     * Growth percentiles are based on CDC (Boys, 2-20 Years) data  Body mass index is 18 77 kg/m²  Vitals:    04/24/23 1538   BP: 112/70   BP Location: Right arm   Patient Position: Sitting   Cuff Size: Child   Pulse: 76   Resp: 17   Temp: 98 6 °F (37 °C)   TempSrc: Temporal   SpO2: 98%   Weight: 40 7 kg (89 lb 12 8 oz)   Height: 4' 10\" (1 473 m)       No results found  Physical Exam  Vitals and nursing note reviewed  Exam conducted with a chaperone present (mother)  Constitutional:       General: He is active  Appearance: Normal appearance  He is well-developed and normal weight  HENT:      Head: Normocephalic and atraumatic  Right Ear: Tympanic membrane, ear canal and external ear normal       Left Ear: Tympanic membrane, ear canal and external ear normal       Nose: Nose normal       Mouth/Throat:      Mouth: Mucous membranes are moist       Pharynx: Oropharynx is clear     Eyes:      " Pertinent lab results reviewed..  All other systems were reviewed and are negative other than as stated in the HPI.    History reviewed. No pertinent past medical history.  No past surgical history on file.  Social History     Socioeconomic History    Marital status:      Spouse name: Not on file    Number of children: Not on file    Years of education: Not on file    Highest education level: Not on file   Occupational History    Not on file   Tobacco Use    Smoking status: Not on file    Smokeless tobacco: Not on file   Substance and Sexual Activity    Alcohol use: Not on file    Drug use: Not on file    Sexual activity: Not on file   Other Topics Concern    Not on file   Social History Narrative    Not on file     Social Drivers of Health     Financial Resource Strain: Not on file   Food Insecurity: No Food Insecurity (5/8/2025)    Hunger Vital Sign     Worried About Running Out of Food in the Last Year: Never true     Ran Out of Food in the Last Year: Never true   Transportation Needs: Not on file   Physical Activity: Not on file   Stress: Not on file   Social Connections: Not on file   Intimate Partner Violence: Not on file   Housing Stability: Not on file     No family history on file.  No Known Allergies    Home Medications:   acetaminophen (TYLENOL) tablet 650 mg  650 mg oral q4h PRN    camphor-methyl salicyl-menthoL (MUSCLE RUB) cream   Topical 3x daily PRN    cefTRIAXone (ROCEPHIN) IVPB 1 g in 100 mL NSS vial in bag  1 g intravenous q24h INT    glucose chewable tablet 15-30 g of dextrose  15-30 g of dextrose oral PRN    Or    dextrose 40 % oral gel 15-30 g of dextrose  15-30 g of dextrose oral PRN    Or    glucagon (GLUCAGEN) injection 1 mg  1 mg intramuscular PRN    Or    dextrose 50 % in water (D50) injection 12.5 g  25 mL intravenous PRN    enoxaparin (LOVENOX) syringe 40 mg  40 mg subcutaneous Daily (6p)    magnesium sulfate IVPB 2g in 50 mL NSS/D5W/SWFI  2 g intravenous Once    oxyCODONE  (ROXICODONE) immediate release tablet 10 mg  10 mg oral q4h PRN    oxyCODONE (ROXICODONE) immediate release tablet 5 mg  5 mg oral q4h PRN    potassium chloride (KLOR-CON M) ER tablet (particles/crystals) 40 mEq  40 mEq oral Once    sodium chloride 0.9 % infusion   intravenous Continuous     Current Medications:  Current Facility-Administered Medications   Medication Dose Route Frequency Provider Last Rate Last Admin    acetaminophen (TYLENOL) tablet 650 mg  650 mg oral q4h PRN Lalo Pan DO   650 mg at 05/09/25 0522    camphor-methyl salicyl-menthoL (MUSCLE RUB) cream   Topical 3x daily PRN Chase Clarke MD        cefTRIAXone (ROCEPHIN) IVPB 1 g in 100 mL NSS vial in bag  1 g intravenous q24h INT Melany Olson MD   Stopped at 05/09/25 0749    glucose chewable tablet 15-30 g of dextrose  15-30 g of dextrose oral PRN Lalo Pan DO        Or    dextrose 40 % oral gel 15-30 g of dextrose  15-30 g of dextrose oral PRN Lalo Pan DO        Or    glucagon (GLUCAGEN) injection 1 mg  1 mg intramuscular PRN Lalo Pan DO        Or    dextrose 50 % in water (D50) injection 12.5 g  25 mL intravenous PRN Lalo Pan DO        enoxaparin (LOVENOX) syringe 40 mg  40 mg subcutaneous Daily (6p) Lalo aPn DO   40 mg at 05/08/25 2238    magnesium sulfate IVPB 2g in 50 mL NSS/D5W/SWFI  2 g intravenous Once Tex Waters MD        oxyCODONE (ROXICODONE) immediate release tablet 10 mg  10 mg oral q4h PRN Chase Clarke MD        oxyCODONE (ROXICODONE) immediate release tablet 5 mg  5 mg oral q4h PRN Chase Clarke MD        potassium chloride (KLOR-CON M) ER tablet (particles/crystals) 40 mEq  40 mEq oral Once eTx Waters MD        sodium chloride 0.9 % infusion   intravenous Continuous Melany Olson  mL/hr at 05/09/25 0000 Rate Change at 05/09/25 0000      OBJECTIVE     Physical Exam  Visit Vitals  BP (!) 122/59 (BP Location: Left upper arm, Patient Position: Lying)   Pulse 67  General: Visual tracking is normal       Extraocular Movements: Extraocular movements intact  Conjunctiva/sclera: Conjunctivae normal       Pupils: Pupils are equal, round, and reactive to light  Funduscopic exam:     Right eye: No papilledema  Left eye: No papilledema  Cardiovascular:      Rate and Rhythm: Normal rate and regular rhythm  Pulses: Normal pulses  Pulses are strong  Heart sounds: Normal heart sounds  No murmur heard  Pulmonary:      Effort: Pulmonary effort is normal       Breath sounds: Normal breath sounds and air entry  No wheezing, rhonchi or rales  Abdominal:      General: Abdomen is flat  Bowel sounds are normal  There is no distension  Palpations: Abdomen is soft  Tenderness: There is no abdominal tenderness  Genitourinary:     Penis: Normal        Testes: Normal  Cremasteric reflex is present  Comments: Richardson 2  Musculoskeletal:         General: No deformity  Normal range of motion  Cervical back: Normal range of motion and neck supple  Lymphadenopathy:      Cervical: No cervical adenopathy  Skin:     General: Skin is warm and dry  Coloration: Skin is not jaundiced  Findings: No rash  Neurological:      General: No focal deficit present  Mental Status: He is alert and oriented for age  Deep Tendon Reflexes: Reflexes are normal and symmetric  Psychiatric:         Mood and Affect: Mood normal          Behavior: Behavior normal          Thought Content: Thought content normal          Judgment: Judgment normal            Assessment:     Well adolescent  1  ADHD (attention deficit hyperactivity disorder), combined type  guanFACINE HCl ER (INTUNIV) 1 MG TB24    DISCONTINUED: guanFACINE HCl ER (INTUNIV) 1 MG TB24      2  Medication management             Plan:         1  Anticipatory guidance discussed    Specific topics reviewed: bicycle helmets, importance of regular dental care, importance of regular exercise, "  Temp 36.7 °C (98 °F) (Oral)   Resp 16   Ht 1.676 m (5' 6\")   Wt 52.2 kg (115 lb)   SpO2 93%   BMI 18.56 kg/m²     General: Awake and alert. Chronically ill appearing  Skin: Warm and dry.    HEENT: Normocephalic.  Sclera anicteric.  Conjunctiva pink. Mucous membranes moist.   Heart: Regular rate and rhythm.  Lungs: Clear to auscultation.   Abdomen: Soft.  No distension. Ostomy in place .   Extremities: L BKA   Psychiatric: Normal affect.  Neurologic: Memory intact. +paraplegia.     LABS & IMAGING     Labs:   Labs reviewed   Lab Results   Component Value Date    WBC 12.34 (H) 05/09/2025    HGB 8.9 (L) 05/09/2025    HCT 28.5 (L) 05/09/2025    MCV 93.4 05/09/2025     (H) 05/09/2025     Lab Results   Component Value Date    DIFFTYPE Auto 05/09/2025    MONOABS 0.89 05/09/2025    EOSABS 0.12 05/09/2025    BASOABS 0.05 05/09/2025     Lab Results   Component Value Date    GLUCOSE 85 05/09/2025    CALCIUM 7.9 (L) 05/09/2025     (L) 05/09/2025    K 3.4 (L) 05/09/2025    CO2 16 (L) 05/09/2025     (H) 05/09/2025    BUN 13 05/09/2025    CREATININE 0.9 05/09/2025     Lab Results   Component Value Date    ALT 6 (L) 05/08/2025    AST 10 (L) 05/08/2025    ALKPHOS 85 05/08/2025    BILITOT 0.4 05/08/2025     Imaging: I have reviewed the Imaging from the last 24 hrs.  CT ANGIOGRAPHY CHEST PULMONARY EMBOLISM WITH IV CONTRAST   Final Result   IMPRESSION:      1.  No evidence of pulmonary embolism.   2.  No acute pulmonary abnormality. Mild centrilobular emphysema. Mild to   moderate paraseptal emphysema. 3-4 mm nodule in left upper lobe, for which   follow-up imaging is recommended as per patient risk factors and current   Fleischner Society guidelines.   3.  See additional findings above.      CC: Lung Nodule Navigator      CT ABDOMEN PELVIS WITH IV CONTRAST   Final Result   IMPRESSION:      1.  Moderate to severe left renal atrophy with multifocal cortical scarring.   Compensatory hypertrophy of right kidney. " importance of varied diet, limit TV, media violence and safe storage of any firearms in the home  Nutrition and Exercise Counseling: The patient's Body mass index is 18 77 kg/m²  This is 55 %ile (Z= 0 14) based on CDC (Boys, 2-20 Years) BMI-for-age based on BMI available as of 4/24/2023  Nutrition counseling provided:  Avoid juice/sugary drinks  Anticipatory guidance for nutrition given and counseled on healthy eating habits  5 servings of fruits/vegetables  Exercise counseling provided:  Anticipatory guidance and counseling on exercise and physical activity given  Reduce screen time to less than 2 hours per day  1 hour of aerobic exercise daily  2  Development: appropriate for age    1  Immunizations today: per orders  None due    4  Follow-up visit in 1 year for next well child visit, or sooner as needed  Heterogeneous or striated nephrograms   could represent acute pyelonephritis, acute tubular necrosis or other pathology.   2.  Mild to moderate bladder wall thickening and mucosal hyperenhancement   suggest cystitis or chronic bladder outlet obstruction. Small bladder   diverticulum. Correlation with urinalysis is recommended.   3.  Hepatomegaly. Probable pseudolesion in medial segment of left lobe.   Nonocclusive thrombus in branches of right hepatic vein.   4.  Status post left hemicolectomy with left abdominal colostomy and fat and   bowel-containing parastomal hernia, as above.   5.  Left adrenal nodule, possibly adenoma but not characterized.   6.  Bilateral common and external iliac lymphadenopathy could be reactive or   metastatic. Correlation with patient's clinical history is required.   7.  High-grade decubitus ulcers in bilateral buttocks with evidence of chronic   osteomyelitis in bilateral ischial tuberosities and inferior pubic rami.   8.  See additional findings above.      Direct comparison with abdominopelvic CT from outside institution, if available,   is recommended to confirm stability of multiple findings. Otherwise, abdominal   MRI with and without contrast is recommended on a nonemergent outpatient basis   for further evaluation.      X-RAY CHEST 1 VIEW   Final Result   IMPRESSION:      No acute cardiopulmonary abnormality on frontal view of chest.      ECG 12 lead            ASSESSMENT & PLAN     Active Problems:    Cystitis    Protein-calorie malnutrition (CMS/HCC)    Sacral wound    Personal history of DVT (deep vein thrombosis)    Bandemia    Pyelonephritis    Anemia    Abnormal finding on CT scan    In summary, this is a 53 y/o male w/ paraplegia, L BKA presenting w/ UTI. Consulted for hepatic vein thrombus.    # Hepatic Vein Thrombus  - CT A/P shows: Hepatomegaly. Probable pseudolesion in medial segment of left lobe.  Nonocclusive age in-determinant thrombus in branches of right hepatic  vein.  - prior provoked DVT I/s/o trauma/ surgery and IVC filter. No other PMH of VTE, no known family VTE    Plan  - would rec MRI A/P to better characterize clot and pseudo-lesion  - would rec tx dose lovenox while in hosp to prevent propagation of clot and liver compromise. If tolerates, would anticipate d/c on eliquis 5 mg BID   - will send limiter hypercoag w/u with APLAS, MPN cascade (given elevated WBC and plts) given unclear provocation for clot. Further hypercoag w/u could be considered as an outpt    # Anemia  - Hgb 8.9, normocytic, suspect underproductive primarily  - suspect maybe anemia of chronic disease (chronic sacral ulcer?)  - no iron deficiency, f/u B12/ folate, retics  - trend. Transfuse for Hgb <8    # Leukocytosis  # UTI  - presenting w/ fever, chills, back ache consistent w/ prior UTIs  - labs w/ leukocytosis   - UA + nitrites, +3 leuk esterase. Ucx pending  - on abx, ID consulted     All questions answered. Thank you, will follow.     Bindu Gallardo PA-C acting as scribe for Luis Lam MD

## 2025-05-09 NOTE — ASSESSMENT & PLAN NOTE
Presenting with reported fevers at home and intermittent nausea.  Found to have +3 leuk esterase in urine and bladder wall thickening on CT scan.  Also with a leukocytosis to 19K.  Well-appearing and remains afebrile since admission.    Moderate to severe left renal atrophy with multifocal cortical scarring.  Compensatory hypertrophy of right kidney. Heterogeneous or striated nephrograms  could represent acute pyelonephritis, acute tubular necrosis or other pathology.  Mild to moderate bladder wall thickening and mucosal hyperenhancement  suggest cystitis or chronic bladder outlet obstruction.      - Start ceftriaxone 1G, every 24 hours  - Continue NSS  mL/h for 24hfs  - Follow-up with urine and bood cultures, pending  - Continue to monitor fever curve  - Continue to monitor leukocytosis: improved on am labs  - Infectious disease consulted, appreciate recommendations

## 2025-05-09 NOTE — CONSULTS
Division of Infectious Diseases - Consultation Report    Patient Name: Mehnaz Bronson  MR#: [id number]  : 1970  Admission Date: 2025  Consult Date: 25 9:23 AM   Consultant: Perla Whatley DO  Reason for Consult: Suspected pyelonephritis    History of Present Illness:  Mehnaz Bronson is a 54 y.o. with PMHx of paraplegia, diverting colostomy, sacral wounds, DVT, foot wounds s/p L BKA (3/2022) who presented to [facility] ED for urinary symptoms.   ID consulted for suspected pyelonephritis.    On presentation, temp 99.1 F, HR 98, BP stable, RR 18, SpO2 89% on RA?  Placed in 3 L NC with sat 98.  Labs: WBC 19.30, .   Blcx obtained and in process.   UA: WBC TNTC, ucx in process.     CXR: no acute cardiopulmonary abnormality.   CTA chest: no PE, no acute pulmonary abnormality, emphysema.  CT A/P: moderate to severe L renal atrophy with multifocal cortical scarring, compensatory hypertrophy of R kidney, heterogeneous or striated nephrograms could represent acute pyelonephritis, ATN or other pathology, mild or moderate bladder wall thickening and mucosa hyperenhancement suggests cystitis or chronic TOVAR, small bladder diverticulum, hepatomegaly, non occlusive thrombus in branches of R hepatic vein, s/p L magno colectomywith L abd colostomy and fat and bowel-contaiing parastomal hernia, b/l common and external iliac KASI, high grade decubitus ulcers in b/l buttocks with evidence of chronic OM in b/l ischial tuberosities and inferior pubic rami.   Has received ceftazidime changed to ceftriaxone.     Patient reports fevers for 4-5 days. Reports measured temps of 101 to 103 F. He also endorses chills and abdominal pain. States due to his spinal cord injury cannot tell if he has any burning with urination. Has no known antibiotic allergies.     Former smoker, denies EtOH or illicit drug use  Surg Hx: ostomy, RLE and spinal hardware, L BKA    Allergies: No Known Allergies  Medical History:  "History reviewed. No pertinent past medical history.  Surgical History: No past surgical history on file.   No IVDA  Family History: noncontributory  Medications:  Current IP Meds (From admission, onward)          Frequency     enoxaparin (LOVENOX) syringe 40 mg  (Assessed at increased VTE risk (Padua score greater than or equal to 4))  Status:  Discontinued         Daily (6p)     potassium chloride (KLOR-CON M) ER tablet (particles/crystals) 40 mEq         Once     magnesium sulfate IVPB 2g in 50 mL NSS/D5W/SWFI         Once     cefTRIAXone (ROCEPHIN) IVPB 1 g in 100 mL NSS vial in bag         Every 24 hours interval     camphor-methyl salicyl-menthoL (MUSCLE RUB) cream         3 times daily PRN     oxyCODONE (ROXICODONE) immediate release tablet 5 mg         Every 4 hours PRN     oxyCODONE (ROXICODONE) immediate release tablet 10 mg         Every 4 hours PRN     melatonin ODT 3 mg         Once     sodium chloride 0.9 % infusion         Continuous     enoxaparin (LOVENOX) syringe 40 mg  (Assessed at increased VTE risk (Padua score greater than or equal to 4))         Daily (6p)     acetaminophen (TYLENOL) tablet 650 mg  (Analgesics - Acetaminophen pain or fever)         Every 4 hours PRN     glucose chewable tablet 15-30 g of dextrose  (Hypoglycemia Treatment Protocol and Hyperglycemia Validation Protocol)        Placed in \"Or\" Linked Group    As needed     dextrose 40 % oral gel 15-30 g of dextrose  (Hypoglycemia Treatment Protocol and Hyperglycemia Validation Protocol)        Placed in \"Or\" Linked Group    As needed     glucagon (GLUCAGEN) injection 1 mg  (Hypoglycemia Treatment Protocol and Hyperglycemia Validation Protocol)        Placed in \"Or\" Linked Group    As needed     dextrose 50 % in water (D50) injection 12.5 g  (Hypoglycemia Treatment Protocol and Hyperglycemia Validation Protocol)        Placed in \"Or\" Linked Group    As needed     sodium chloride 0.9 % infusion  Status:  Discontinued         " "Continuous     vancomycin 1.25 gram/250 mL IVPB in NSS  (ED IV Antibiotics)  Status:  Discontinued         Once     cefTAZidime (FORTAZ) IVPB 2 g/100 mL NSS vial in bag  (ED IV Antibiotics)         Once     iopamidoL (ISOVUE-370) 370 mg iodine /mL (76 %) injection 100 mL         Once in imaging     sodium chloride 0.9 % bolus 1,000 mL         Once          Physical Examination:  Vital signs reviewed  Temp (24hrs), Av.8 °C (98.2 °F), Min:36.6 °C (97.9 °F), Max:37.3 °C (99.1 °F)    Visit Vitals  BP (!) 122/59 (BP Location: Left upper arm, Patient Position: Lying)   Pulse 67   Temp 36.7 °C (98 °F) (Oral)   Resp 16   Ht 1.676 m (5' 6\")   Wt 52.2 kg (115 lb)   SpO2 93%   BMI 18.56 kg/m²     General: NAD, thin  Head: NC/AT  Neck: soft, non tender  Eyes: no scleral icterus, pale conjunctivae  EENT: oral mucosa pink and moist  Respiratory: CTA b/l, no wheezes/rales rhonchi, unlabored  Cardiovascular: RRR, + S1/S2, no murmurs/rubs/gallops  Abdominal: soft, non tender, +ostomy  Neurological: awake and alert, answers questions appropriately  Extremities: s/p L BKA  MSK: R CVA tenderness  Lymphatics: no cervical lymphadenopathy  Integumentary: warm, dry, buttocks/sacral wound clean without evidence of surrounding soft tissue infection, R heel wound clean    Labs:  CBC Results         258 1652    WBC 12.34 19.30    RBC 3.05 3.76    HGB 8.9 10.8    HCT 28.5 35.2    MCV 93.4 93.6    MCH 29.2 28.7    MCHC 31.2 30.7     576          BMP Results         258 1652     135    K 3.4 3.6    Cl 110 106    CO2 16 17    Glucose 85 101    BUN 13 18    Creatinine 0.9 1.0    Calcium 7.9 9.4    Anion Gap 9 12    EGFR >60.0 >60.0           Comment for K at 1652 on 25: Results obtained on plasma. Plasma Potassium values may be up to 0.4 mEQ/L less than serum values. The differences may be greater for patients with high platelet or white cell counts.    Comment for EGFR at 0418 on " 05/09/25: Calculation based on the Chronic Kidney Disease Epidemiology Collaboration (CKD-EPI) equation refit without adjustment for race.    Comment for EGFR at 1652 on 05/08/25: Calculation based on the Chronic Kidney Disease Epidemiology Collaboration (CKD-EPI) equation refit without adjustment for race.          PT/PTT Results    No lab values to display.       UA Results         05/08/25     1844    Color Brown    Clarity Turbid    Glucose Negative    Bilirubin Negative    Ketones Negative    Sp Grav 1.023    Blood +1    Ph 6.0    Protein +1    Urobilinogen 0.2    Nitrite Positive    Leuk Est +3    WBC Too Numerous To Count    RBC 20 TO 35    Bacteria +2           Comment for Blood at 1844 on 05/08/25: The sensitivity of the occult blood test is equivalent to approximately 4 intact RBC/HPF.          Lactate Results         05/08/25     1715    Lactate 0.7          Microbiology Results       ** No results found for the last 720 hours. **            Imaging:   Recent Results (from the past 6 weeks)   X-RAY CHEST 1 VIEW    Collection Time: 05/08/25  5:49 PM    Narrative    CLINICAL HISTORY:  54-year-old male with shortness of breath and hypoxia.    COMPARISON:  None.    TECHNIQUE:  A portable AP erect view of the chest is obtained.    COMMENT:    The heart is normal in size. Mediastinal contours are unremarkable. No  pneumothorax or pleural effusion is seen. The lungs are well-inflated and appear  grossly clear. Spinal fusion hardware extending from the lower thoracic spine  into the lumbar spine is partially imaged.      Impression    IMPRESSION:    No acute cardiopulmonary abnormality on frontal view of chest.   CT ANGIOGRAPHY CHEST PULMONARY EMBOLISM WITH IV CONTRAST    Collection Time: 05/08/25  6:18 PM    Narrative    CLINICAL HISTORY:  54-year-old male with dyspnea on exertion.    COMPARISON:  Radiograph of chest obtained earlier on the same date.    TECHNIQUE:  CT angiography of the chest is performed  following the uneventful  administration of intravenous contrast according to the pulmonary embolism  protocol. Coronal and sagittal reconstructions are obtained. Three-dimensional  MIP and/or volume rendered image reconstruction is performed and reviewed. One  or more dose reduction techniques (e.g., automated exposure control, adjustment  of the mA and/or kV according to the patient size, use of iterative  reconstruction technique) are utilized for this examination.    CONTRAST:  100 mL Isovue-370 IV contrast    COMMENT:    Pulmonary Arteries:  Pulmonary arteries are adequately opacified by intravenous  contrast, although bolus timing results in similar opacification of pulmonary  arteries, pulmonary veins and thoracic aorta. No filling defects are seen from  the level of the main pulmonary artery to the level of the proximal subsegmental  branches to suggest pulmonary embolism.    RV/LV Ratio:  Not applicable.    Aorta and Great Vessels:  No aortic aneurysm or evident dissection. Mild  atherosclerotic changes consist of noncalcified plaque. Left vertebral artery  arises separately from aortic arch, a normal anatomic variant.    Heart:  Normal cardiac size. No pericardial effusion.    Lungs:  Very mild pleural/parenchymal scarring in lung apices. Mild  centrilobular emphysema. Mild to moderate paraseptal emphysema. No acute or  lobar consolidation. Multifocal, mild scarring and/or subsegmental atelectasis  in basilar lower lobes. Solid nodule in apical left upper lobe measures 3-4 mm  on image 27 of axial lung 1.25 mm series. Central tracheobronchial tree is  patent with probable, small amount of adherent secretions in tracheal lumen near  haley.    Pleura:  No pleural effusion or pneumothorax.    Mary [place], Mediastinum and Lymph Nodes:  No suspect mass or lymphadenopathy. 3 mm  calcification in right lobe of thyroid gland on image 18 of axial standard 2.5  series is noted.    Visualized Upper Abdomen:  No suspicious  findings.    Osseous Structures:  Mild thoracic kyphosis. Degenerative changes. Postoperative  changes of spinal fusion utilizing posterior rods and pedicle screws which  extend from T11 to L3 with evidence of discectomy and partial osseous fusion at  T12-L1. Healed fractures of multiple right ribs.    Additional Findings:  Paucity of subcutaneous fat.      Impression    IMPRESSION:    1.  No evidence of pulmonary embolism.  2.  No acute pulmonary abnormality. Mild centrilobular emphysema. Mild to  moderate paraseptal emphysema. 3-4 mm nodule in left upper lobe, for which  follow-up imaging is recommended as per patient risk factors and current  Fleischner Society guidelines.  3.  See additional findings above.    CC: Lung Nodule Navigator   CT ABDOMEN PELVIS WITH IV CONTRAST    Collection Time: 05/08/25  6:18 PM    Narrative    CLINICAL HISTORY:  54-year-old male with nausea and vomiting.    COMPARISON:  None.    TECHNIQUE:  Axial CT images of the abdomen and pelvis are obtained following the  uneventful administration of intravenous contrast. Oral contrast is not  administered. Coronal and sagittal images are reformatted. One or more dose  reduction techniques (e.g., automated exposure control, adjustment of the mA  and/or kV according to the patient size, use of iterative reconstruction  technique) are utilized for this examination.    CONTRAST:  100 mL Isovue-370 IV contrast    COMMENT:    Lung Bases:  Emphysematous changes.    Liver:  Enlarged. Ovoid, masslike area of decreased attenuation in inferior  aspect of medial segment of left lobe measures approximately 22 x 42 mm on image  75 of axial standard 2.5 series, becomes less conspicuous on delayed images and  does not definitely exert mass effect on traversing vessels, favoring a  pseudolesion due to localized steatosis. Small, elongated area of  hypoattenuation in branches of right hepatic vein on images 37-48 is suspicious  for nonocclusive,  age-indeterminate thrombus.    Gallbladder and Biliary Tree:  No suspicious findings.    Spleen:  Subcentimeter, hypoattenuating lesion in spleen on image 42 is too  small to accurately characterize by CT but almost certainly represents a benign  lesion such as a cyst.    Pancreas:  No suspicious findings.    Adrenal Glands:  Heterogeneously hypoattenuating left adrenal nodule measures  approximately 25 x 13 mm on image 21 and likely represents an adenoma although  not characterized as such on contrast-enhanced CT.    Kidneys:  Moderate to severe atrophy of left kidney with multifocal cortical  scarring and heterogeneous nephrogram. Compensatory hypertrophy of right kidney  with striated nephrogram which may be accentuated by beam hardening artifact  related to large amount of metallic spinal hardware but is suspicious for  pathologic process such as acute pyelonephritis. Subcentimeter, hypoattenuating  lesion in medial lower pole of right kidney on image 46 is too small to  characterize by CT but likely represents a cyst. Both kidneys excrete contrast  on delayed images.    Pelvic Viscera:  Mild to moderate, circumferential thickening of bladder wall  with mild trabeculation and mild mucosal hyperenhancement suggest cystitis or  chronic bladder outlet obstruction. Diverticulum arising from right  posterolateral aspect of bladder measures 30 x 19 mm on image 105.    Bowel:  No bowel obstruction. Postoperative changes of left hemicolectomy with  left lower abdominal colostomy. Segments of small and large bowel as well as fat  appear to protrude through ostomy, external to abdominal wall and should be  correlated clinically.    Peritoneum, Retroperitoneum and Lymph Nodes:  No definite acute inflammatory  process, pneumoperitoneum or ascites. Bilateral common and external iliac  lymphadenopathy measures up to 17 x 24 mm on the right on image 118. Several  prominent but not pathologically enlarged inguinal lymph nodes  are noted  bilaterally. Aortoiliac atherosclerosis consists of calcified and noncalcified  plaque. Diminutive inferior vena cava is poorly visualized, suggesting chronic  thrombosis, and contains an IVC filter.    Osseous Structures:  Degenerative changes. Postoperative changes of spinal  fusion utilizing posterior rods and pedicle screws which extend from T11 to L3  with evidence of discectomy and partial osseous fusion at T12-L1 as well as  multilevel laminectomy. Probable bone graft donor site in left iliac bone.  Intramedullary noemí in right femur is partially imaged with abundant heterotopic  ossification noted adjacent to greater and lesser trochanters and proximal shaft  of femur. See below.    Additional Findings:  High-grade decubitus ulcers in bilateral buttocks expose  portions of ischial tuberosities and inferior pubic rami with evidence of  chronic osteomyelitis and partial bony destruction bilaterally. Diffuse fatty  atrophy of musculature in hips and proximal lower extremities, possibly due to  disuse.      Impression    IMPRESSION:    1.  Moderate to severe left renal atrophy with multifocal cortical scarring.  Compensatory hypertrophy of right kidney. Heterogeneous or striated nephrograms  could represent acute pyelonephritis, acute tubular necrosis or other pathology.  2.  Mild to moderate bladder wall thickening and mucosal hyperenhancement  suggest cystitis or chronic bladder outlet obstruction. Small bladder  diverticulum. Correlation with urinalysis is recommended.  3.  Hepatomegaly. Probable pseudolesion in medial segment of left lobe.  Nonocclusive thrombus in branches of right hepatic vein.  4.  Status post left hemicolectomy with left abdominal colostomy and fat and  bowel-containing parastomal hernia, as above.  5.  Left adrenal nodule, possibly adenoma but not characterized.  6.  Bilateral common and external iliac lymphadenopathy could be reactive or  metastatic. Correlation with  patient's clinical history is required.  7.  High-grade decubitus ulcers in bilateral buttocks with evidence of chronic  osteomyelitis in bilateral ischial tuberosities and inferior pubic rami.  8.  See additional findings above.    Direct comparison with abdominopelvic CT from outside institution, if available,  is recommended to confirm stability of multiple findings. Otherwise, abdominal  MRI with and without contrast is recommended on a nonemergent outpatient basis  for further evaluation.       Anti-infectives (From admission, onward)      Start     Dose/Rate Route Frequency Ordered Stop    05/09/25 0700  cefTRIAXone (ROCEPHIN) IVPB 1 g in 100 mL NSS vial in bag         1 g  200 mL/hr over 30 Minutes intravenous Every 24 hours interval 05/08/25 2117              Assessment:   #Sepsis without shock: in setting of pyelonephritis/complicated UTI  #Pyelonephritis: R CVA tenderness, pyuria on UA. Urine culture in process. CT imaging concerning for possible pyelonephritis  #Right hepatic vein thrombus?: heme/onc consulted  #Sacral wound: looks clean, does not appear acutely infected  Estimated Creatinine Clearance: 70.9 mL/min (by C-G formula based on SCr of 0.9 mg/dL).    Plan:   -Discontinue ceftriaxone, resume on ceftazidime  -Consult wound care for care of sacral wounds  -Follow up on pending microbiology  -Plan discussed with primary team    Thank you for the courtesy of the consultation request. Please contact me if you have any questions about this case.     Perla Whatley DO    [phone]    5/9/2025 9:23 AM

## 2025-05-09 NOTE — ASSESSMENT & PLAN NOTE
Hepatomegaly. Probable pseudolesion in medial segment of left lobe.  Nonocclusive thrombus in branches of right hepatic vein.    - Ultrasound of liver ordered for further evaluation, pending

## 2025-05-09 NOTE — CONSULTS
Wound Ostomy Continence Note    Subjective    HPI Patient is a 54 y.o. male who was admitted on 5/8/2025 with a diagnosis of Hypoxia [R09.02]  Sepsis (CMS/HCC) [A41.9]  Leukocytosis, unspecified type [D72.829]  Left upper lobe pulmonary nodule [R91.1].    Problem list Active Problems:    Cystitis    Protein-calorie malnutrition (CMS/HCC)    Sacral wound    Personal history of DVT (deep vein thrombosis)    Bandemia    Pyelonephritis    Anemia    Abnormal finding on CT scan    Electrolyte abnormality    Abnormal CT scan     PMH/PSH History reviewed. No pertinent past medical history.  No past surgical history on file.   Assessment and Recommendation     Wound Pressure Injury Right Ischial (Active)   Pressure Injury Stage Unstageable 05/09/25 1507   Dressing Appearance intact 05/09/25 1507   Periwound Appearance Intact 05/09/25 1507   Drainage Characteristics/Odor serous;no odor 05/09/25 1507   Drainage Amount small 05/09/25 1507   Area intact 05/09/25 1507   Edges open 05/09/25 1507   Wound Length (cm) 6 cm 05/09/25 1507   Wound Width (cm) 2.3 cm 05/09/25 1507   Wound Depth (cm) 1 cm 05/09/25 1507   Wound Surface Area (cm^2) 10.84 cm^2 05/09/25 1507   Wound Volume (cm^3) 7.226 cm^3 05/09/25 1507   Slough % 70 05/09/25 1507   Granulation % 30 05/09/25 1507   Recommendation Santyl and dry dressing change daily turn q 2hours, optimize nutrition 05/09/25 1507       Wound Pressure Injury Sacrum (Active)   Pressure Injury Stage Stage 2 05/09/25 1509   Dressing Appearance intact 05/09/25 1509   Periwound Appearance Intact 05/09/25 1509   Drainage Amount none 05/09/25 1509   Base pink 05/09/25 1509   Area intact 05/09/25 1509   Edges open 05/09/25 1509   Wound Length (cm) 3 cm 05/09/25 1509   Wound Width (cm) 2 cm 05/09/25 1509   Wound Depth (cm) 0.1 cm 05/09/25 1509   Wound Surface Area (cm^2) 4.71 cm^2 05/09/25 1509   Wound Volume (cm^3) 0.314 cm^3 05/09/25 1509   Recommendation Cleanse with NSS apply Foam dressing change 3  - 4 days 05/09/25 1509       Wound Pressure Injury Left Sacrum (Active)   Pressure Injury Stage Unstageable 05/09/25 1508   Dressing Appearance intact 05/09/25 1508   Periwound Appearance Intact 05/09/25 1508   Drainage Amount none 05/09/25 1508   Area intact 05/09/25 1508   Edges open 05/09/25 1508   Wound Length (cm) 1 cm 05/09/25 1508   Wound Width (cm) 0.8 cm 05/09/25 1508   Wound Depth (cm) 0.1 cm 05/09/25 1508   Wound Surface Area (cm^2) 0.63 cm^2 05/09/25 1508   Wound Volume (cm^3) 0.042 cm^3 05/09/25 1508   Slough % 100 05/09/25 1508   Recommendation Santyl and dry dressing change daily turn q 2hours, optimize nutrition 05/09/25 1508         05/09/25 1514   Wound Right Heel   Date First Assessed/Time First Assessed: 05/09/25 1247   Present on Original Admission: Yes  Wound Location Orientation: Right  Location: Heel   Dressing Appearance intact   Periwound Appearance Intact   Drainage Characteristics/Odor serous;no odor   Drainage Amount small   Area intact   Edges open   Wound Length (cm) 3 cm   Wound Width (cm) 1.5 cm   Wound Depth (cm) 0.2 cm   Wound Surface Area (cm^2) 3.53 cm^2   Wound Volume (cm^3) 0.471 cm^3   Slough % 50   Granulation % 50   Recommendation Santyl and dry dressing change daily turn q 2hours, optimize nutrition       Date: 05/09/25  Signature: ANKIT Garcia

## 2025-05-09 NOTE — ASSESSMENT & PLAN NOTE
-Has IVC filter in place       -CT abdomen pelvis with IV contrast: Nonocclusive thrombus in branches of right hepatic vein. ?Need for full anticoagulation       - Hematology consulted: Appreciate recommendations       - Continue prophylactic dose of Lovenox for now

## 2025-05-09 NOTE — PLAN OF CARE
Care Coordination Admission Assessment Note    General Information:  Readmission Within the last 30 days: no previous admission in last 30 days  Does patient have a :    Patient-Specific Goals (include timeframe): home    Living Arrangements:  Arrived From: home  Current Living Arrangements: home  People in Home: parent(s)  Home Accessibility:    Living Arrangement Comments: 0STE    Housing Stability and Utility Access (SDOH):  In the last 12 months, was there a time when you were not able to pay the mortgage or rent on time?: No  In the past 12 months, how many times have you moved?: 1  At any time in the past 12 months, were you homeless or living in a shelter (including now)?: Yes  In the past 12 months has the electric, gas, oil, or water company threatened to shut off services in your home?: No    Functional Status Prior to Admission:   Assistive Device/Animal Currently Used at Home: power chair  Functional Status Comments: w/c for mobility  IADL Comments: ind with ADL but currently working on hiring aids with [employer]     Supports and Services:  Current Outpatient/Agency/Support Group: none  Type of Current Home Care Services: none  History of home care episode or rehab stay: no hx rehab or C    Discharge Needs Assessment:   Concerns to be Addressed: discharge planning, care coordination/care conferences  Current Discharge Risk: chronically ill, dependent with mobility/activities of daily living  Anticipated Changes Related to Illness: inability to care for self    Patient/Family Anticipated Discharge Plan:  Patient/Family Anticipates Transition To: home, home with family, home with help/services  Patient/Family Anticipated Services at Transition: none    Connection to Community  Not applicable    Patient Choice:   Offered/Gave Vendor List: yes  Patient and/or patient guardian/advocate was made aware of their right to choose a provider. A list of eligible providers was presented and reviewed  with the patient and/or patient guardian/advocate in written and/or verbal form. The list delineates providers in the patient’s desired geographic area who are participating in the Medicare program and/or providers contracted with the patient’s primary insurance. The Medicare list and quality ratings were obtained from the Medicare.gov [medicare.gov] website.    Anticipated Discharge Plan:  Met with patient. Provided education and contact information for Care Coordination services.: yes  Anticipated Discharge Disposition: home with assistance, home with home health  Type of Home Care Services: home health aide    Transportation Needs (SDOH):  Transportation Concerns: none  Transportation Anticipated: car, drives self  Is Out of Hospital DNR needed at discharge?: no    In the past 12 months, has lack of transportation kept you from medical appointments or from getting medications?: No  In the past 12 months, has lack of transportation kept you from meetings, work, or from getting things needed for daily living?: No    Concerns - comments:  cc met with pt to complete CMA introduced self and role to pt left pt PCP list due to no PCP confirmed pharmacy pt states w/c bound due to paraplegic but does drive and has van here in lot pt states currently hiring aids for home with [employer] (has had them in past) to help him with ADL Pt states lives with mother who he helps take care of  DCP:home with aids  2:30  Spoke with Eduardo(traveler ins RN) who states pt may have [facility] and she will give [facility] info for billing. CC notified Marcella([facility]) and also notified her pt has no PCP. Marcella [facility] will attempt to find PCP and call Eduardo at ins co

## 2025-05-09 NOTE — PROGRESS NOTES
Hospital Medicine     Daily Progress Note                SUBJECTIVE   Interval History: Patient seen and examined this morning.  Denied any chest pain or shortness of breath, no abdominal pain, no nausea/vomiting symptoms.  No diarrhea.  Afebrile overnight Tmax 99.1 Fahrenheit     OBJECTIVE      Vital signs in last 24 hours:  Temp:  [36.6 °C (97.9 °F)-37.3 °C (99.1 °F)] 36.7 °C (98 °F)  Heart Rate:  [64-98] 67  Resp:  [16-18] 16  BP: (113-156)/(59-85) 122/59    Intake/Output Summary (Last 24 hours) at 5/9/2025 1028  Last data filed at 5/9/2025 0400  Gross per 24 hour   Intake --   Output 900 ml   Net -900 ml         PHYSICAL EXAMINATION      Physical Exam  Constitutional:       General: He is not in acute distress.     Appearance: Normal appearance. He is not ill-appearing.   HENT:      Head: Normocephalic and atraumatic.   Cardiovascular:      Rate and Rhythm: Normal rate and regular rhythm.      Pulses: Normal pulses.      Heart sounds: Normal heart sounds.   Pulmonary:      Effort: Pulmonary effort is normal.      Breath sounds: Normal breath sounds.   Abdominal:      General: Abdomen is flat.      Palpations: Abdomen is soft. NT, +BS     Comments: Ostomy bag in place   Musculoskeletal:         General: No swelling or tenderness. Normal range of motion.      Right lower leg: No edema.      Left lower leg: No edema.      Comments: Left BKA.   Skin:     General: Skin is warm and dry.      Comments: stage IV sacral decubitus ulcers   Neurological:      Mental Status: He is alert.   Psychiatric:         Mood and Affect: Mood normal.         Behavior: Behavior normal.         LINES, CATHETERS, DRAINS, AIRWAYS, AND WOUNDS   Lines, Drains, and Airways:  Wounds (agree with documentation and present on admission):  Peripheral IV (Adult) 05/08/25 Right Antecubital (Active)   Number of days: 1       Wound Pressure Injury Buttock (Active)   Number of days: 1         Comments:    LABS / IMAGING / TELE       Labs  Reviewed  Results from last 7 days   Lab Units 05/09/25  0418 05/08/25  1652   WBC K/uL 12.34* 19.30*   HEMOGLOBIN g/dL 8.9* 10.8*   HEMATOCRIT % 28.5* 35.2*   PLATELETS K/uL 451* 576*      Results from last 7 days   Lab Units 05/09/25  0418 05/08/25  1652   SODIUM mEQ/L 135* 135*   POTASSIUM mEQ/L 3.4* 3.6   CHLORIDE mEQ/L 110* 106   CO2 mEQ/L 16* 17*   BUN mg/dL 13 18   CREATININE mg/dL 0.9 1.0   GLUCOSE mg/dL 85 101*   CALCIUM mg/dL 7.9* 9.4          Imaging  I have independently reviewed the pertinent imaging from the last 24 hrs.    ECG/Telemetry  Normal sinus rhythm on telemetry    ASSESSMENT AND PLAN        Assessment & Plan  Cystitis  Presenting with reported fevers at home and intermittent nausea.  Found to have +3 leuk esterase in urine and bladder wall thickening on CT scan.  Also with a leukocytosis to 19K.  Well-appearing and remains afebrile since admission.    Moderate to severe left renal atrophy with multifocal cortical scarring.  Compensatory hypertrophy of right kidney. Heterogeneous or striated nephrograms  could represent acute pyelonephritis, acute tubular necrosis or other pathology.  Mild to moderate bladder wall thickening and mucosal hyperenhancement  suggest cystitis or chronic bladder outlet obstruction.      - Start ceftriaxone 1G, every 24 hours  - Continue NSS  mL/h for 24hfs  - Follow-up with urine and bood cultures, pending  - Continue to monitor fever curve  - Continue to monitor leukocytosis: improved on am labs  - Infectious disease consulted, appreciate recommendations  Anemia  Hemoglobin 10.8 on admission.  No signs of overt bleeding.   - Hemoglobin down to 8.9 this morning post hydration    - Check Iron and TIBC, pending  - Check ferritin, pending  Abnormal finding on CT scan  Hepatomegaly. Probable pseudolesion in medial segment of left lobe.  Nonocclusive thrombus in branches of right hepatic vein.    - hematology consulted  Protein-calorie malnutrition  (CMS/HCC)  Appears malnourished BMI of 18.56  - Nutritional services consulted, appreciate recommendations  Sacral wound  Chronic sacral wounds. ?acute infection  CT: High-grade decubitus ulcers in bilateral buttocks with evidence of chronic   osteomyelitis in bilateral ischial tuberosities and inferior pubic rami.   ID consulted and Wound care consulted  Personal history of DVT (deep vein thrombosis)       -Has IVC filter in place       -CT abdomen pelvis with IV contrast: Nonocclusive thrombus in branches of right hepatic vein. ?Need for full anticoagulation       - Hematology consulted: Appreciate recommendations       - Continue prophylactic dose of Lovenox for now  Electrolyte abnormality  Mild hypokalemia and hypomagnesemia  Replete and monitor  Abnormal CT scan  3-4 mm nodule in left upper lobe: Outpatient follow-up for repeat imaging recommended  Bilateral common and external iliac lymphadenopathy could be reactive or   Metastatic -hematology consulted  Will need outpatient follow-up.  This was discussed with patient      VTE Assessment: Padua    VTE Prophylaxis:  Current anticoagulants:  enoxaparin (LOVENOX) syringe 40 mg, subcutaneous, Daily (6p)      Code Status: Full Code      Estimated Discharge Date: 5/11/2025   Disposition Planning: TBD pending clinical course    Spent 50 minutes on patient's clinical care reviewing chart, addressing his active medical issues, discussing with consultants and other care team, updating patient and family.          Tex Waters MD  5/9/2025

## 2025-05-09 NOTE — ASSESSMENT & PLAN NOTE
Presenting with reported fevers at home and intermittent nausea.  Found to have +3 leuk esterase in urine and bladder wall thickening on CT scan.  Also with a leukocytosis to 19K.  Well-appearing and remains afebrile since admission.    Moderate to severe left renal atrophy with multifocal cortical scarring.  Compensatory hypertrophy of right kidney. Heterogeneous or striated nephrograms  could represent acute pyelonephritis, acute tubular necrosis or other pathology.  Mild to moderate bladder wall thickening and mucosal hyperenhancement  suggest cystitis or chronic bladder outlet obstruction.      - Start ceftriaxone 1G, every 24 hours  - Continue NSS  mL/h  - Follow-up with urine cultures, pending  - Continue to monitor fever curve  - Continue to monitor leukocytosis on a.m. labs  - Infectious disease consulted, appreciate recommendations

## 2025-05-09 NOTE — PROGRESS NOTES
[facility]: I received a referral for home care from Kizzy BARFIELD. I called [facility] to see if that would take Pt as a Pt since he does not have a PCP. I will await a call back from [facility]. I will continue to follow. Marcella Mccormack RN     Addendum: I spoke with [facility] Beltr[facility], she asked me to send Pt information and she will check if they can accept Pt. Marcella Mccormack RN     Addendum: I called [facility] [employer], I left a message with cost of home care visit and I did not hear back.I also did not hear back from [facility] if they are able to accept Pt. I will follow up on Monday. Marcella Mccormack RN

## 2025-05-09 NOTE — ASSESSMENT & PLAN NOTE
Chronic sacral wounds. ?acute infection  CT: High-grade decubitus ulcers in bilateral buttocks with evidence of chronic   osteomyelitis in bilateral ischial tuberosities and inferior pubic rami.   ID consulted and Wound care consulted

## 2025-05-09 NOTE — ASSESSMENT & PLAN NOTE
3-4 mm nodule in left upper lobe: Outpatient follow-up for repeat imaging recommended  Bilateral common and external iliac lymphadenopathy could be reactive or   Metastatic -hematology consulted  Will need outpatient follow-up.  This was discussed with patient

## 2025-05-09 NOTE — PLAN OF CARE
Problem: Adult Inpatient Plan of Care  Goal: Plan of Care Review  Outcome: Progressing  Flowsheets (Taken 5/9/2025 1121)  Progress: improving  Plan of Care Reviewed With: patient     Problem: Malnutrition  Goal: Improved Nutritional Intake  Outcome: Progressing

## 2025-05-09 NOTE — H&P
[facility] [place]        CHIEF COMPLAINT   Nausea and fever suspected UTI     HISTORY OF PRESENT ILLNESS      This is a 54 y.o. male with a past medical history of paraplegia,s/p BKA left-sided, and chronic sacral pressure ulcers with bone exposure (which he manages independently at home), presents with one month of progressive nausea, vomiting, intermittent high-grade fevers, and malaise. He reports not seeking prior medical care for these symptoms and has not taken any antibiotics. He suspects the symptoms may be related to a urinary tract infection, given a similar pattern of low back pain and systemic symptoms he experienced in the past. He tried OTC Azo, which changed his urine color but did not relieve his symptoms. He states that his ability to seek care has been limited due to his responsibilities as a primary caregiver for his disabled mother.    The patient is wheelchair-bound, does not take any medications regularly, and smokes cigarettes daily. He denies oxygen use at home. He reports poor nutritional intake and significant unintentional weight loss (~20 lbs over the past year) due to his limited ability to care for himself and prioritize meals while attending to his mother’s needs.    In the ED:    Vital signs:  Vitals:    05/08/25 2204   BP: 124/61   Pulse: 81   Resp: 18   Temp: 36.6 °C (97.9 °F)   SpO2: 93%      Lab significant for:  Sodium 135, potassium 3.6 CO2 17 glucose 101 WBC 19K hemoglobin 10.8 hematocrit 35.2    CT abdomen and pelvis:  1.  Moderate to severe left renal atrophy with multifocal cortical scarring.  Compensatory hypertrophy of right kidney. Heterogeneous or striated nephrograms  could represent acute pyelonephritis, acute tubular necrosis or other pathology.  2.  Mild to moderate bladder wall thickening and mucosal hyperenhancement  suggest cystitis or chronic bladder outlet obstruction. Small bladder  diverticulum. Correlation with urinalysis is  recommended.  3.  Hepatomegaly. Probable pseudolesion in medial segment of left lobe.  Nonocclusive thrombus in branches of right hepatic vein.    Patient is full code  PAST MEDICAL AND SURGICAL HISTORY      PMHx:  History reviewed. No pertinent past medical history.    PSHx:  No past surgical history on file.    PCP:   Pt States, No Pcp    MEDICATIONS      Prior to Admission medications    Not on File       Home medications were personally reviewed.    ALLERGIES      Patient has no known allergies.    FAMILY HISTORY      No family history on file.    SOCIAL HISTORY      Social History     Socioeconomic History    Marital status:      Spouse name: None    Number of children: None    Years of education: None    Highest education level: None     Social Drivers of Health     Food Insecurity: No Food Insecurity (5/8/2025)    Hunger Vital Sign     Worried About Running Out of Food in the Last Year: Never true     Ran Out of Food in the Last Year: Never true       REVIEW OF SYSTEMS      Review of Systems    PHYSICAL EXAMINATION      Temp:  [36.6 °C (97.9 °F)-37.3 °C (99.1 °F)] 36.6 °C (97.9 °F)  Heart Rate:  [72-98] 81  Resp:  [16-18] 18  BP: (124-156)/(61-85) 124/61  Body mass index is 18.56 kg/m².    Physical Exam  Constitutional:       General: He is not in acute distress.     Appearance: Normal appearance. He is not ill-appearing.   HENT:      Head: Normocephalic and atraumatic.   Cardiovascular:      Rate and Rhythm: Normal rate and regular rhythm.      Pulses: Normal pulses.      Heart sounds: Normal heart sounds.   Pulmonary:      Effort: Pulmonary effort is normal.      Breath sounds: Normal breath sounds.   Abdominal:      General: Abdomen is flat.      Palpations: Abdomen is soft.      Comments: Ostomy bag in place   Musculoskeletal:         General: No swelling or tenderness. Normal range of motion.      Right lower leg: No edema.      Left lower leg: No edema.      Comments: Left BKA.   Skin:      General: Skin is warm and dry.      Comments: Chronic stage IV sacral decubitus ulcers   Neurological:      Mental Status: He is alert.   Psychiatric:         Mood and Affect: Mood normal.         Behavior: Behavior normal.         Thought Content: Thought content normal.       LABS / IMAGING / EKG        Labs  Recent Labs   Lab 05/08/25  1652   WBC 19.30*   HGB 10.8*   *        Results from last 7 days   Lab Units 05/08/25  1652   SODIUM mEQ/L 135*   POTASSIUM mEQ/L 3.6   CHLORIDE mEQ/L 106   CO2 mEQ/L 17*   BUN mg/dL 18   CREATININE mg/dL 1.0   CALCIUM mg/dL 9.4   ALBUMIN g/dL 4.3   BILIRUBIN TOTAL mg/dL 0.4   ALK PHOS IU/L 85   ALT IU/L 6*   AST IU/L 10*   GLUCOSE mg/dL 101*        Imaging  CT ABDOMEN PELVIS WITH IV CONTRAST  Result Date: 5/8/2025  Narrative: CLINICAL HISTORY:  54-year-old male with nausea and vomiting. COMPARISON:  None. TECHNIQUE:  Axial CT images of the abdomen and pelvis are obtained following the uneventful administration of intravenous contrast. Oral contrast is not administered. Coronal and sagittal images are reformatted. One or more dose reduction techniques (e.g., automated exposure control, adjustment of the mA and/or kV according to the patient size, use of iterative reconstruction technique) are utilized for this examination. CONTRAST:  100 mL Isovue-370 IV contrast COMMENT: Lung Bases:  Emphysematous changes. Liver:  Enlarged. Ovoid, masslike area of decreased attenuation in inferior aspect of medial segment of left lobe measures approximately 22 x 42 mm on image 75 of axial standard 2.5 series, becomes less conspicuous on delayed images and does not definitely exert mass effect on traversing vessels, favoring a pseudolesion due to localized steatosis. Small, elongated area of hypoattenuation in branches of right hepatic vein on images 37-48 is suspicious for nonocclusive, age-indeterminate thrombus. Gallbladder and Biliary Tree:  No suspicious findings. Spleen:   Subcentimeter, hypoattenuating lesion in spleen on image 42 is too small to accurately characterize by CT but almost certainly represents a benign lesion such as a cyst. Pancreas:  No suspicious findings. Adrenal Glands:  Heterogeneously hypoattenuating left adrenal nodule measures approximately 25 x 13 mm on image 21 and likely represents an adenoma although not characterized as such on contrast-enhanced CT. Kidneys:  Moderate to severe atrophy of left kidney with multifocal cortical scarring and heterogeneous nephrogram. Compensatory hypertrophy of right kidney with striated nephrogram which may be accentuated by beam hardening artifact related to large amount of metallic spinal hardware but is suspicious for pathologic process such as acute pyelonephritis. Subcentimeter, hypoattenuating lesion in medial lower pole of right kidney on image 46 is too small to characterize by CT but likely represents a cyst. Both kidneys excrete contrast on delayed images. Pelvic Viscera:  Mild to moderate, circumferential thickening of bladder wall with mild trabeculation and mild mucosal hyperenhancement suggest cystitis or chronic bladder outlet obstruction. Diverticulum arising from right posterolateral aspect of bladder measures 30 x 19 mm on image 105. Bowel:  No bowel obstruction. Postoperative changes of left hemicolectomy with left lower abdominal colostomy. Segments of small and large bowel as well as fat appear to protrude through ostomy, external to abdominal wall and should be correlated clinically. Peritoneum, Retroperitoneum and Lymph Nodes:  No definite acute inflammatory process, pneumoperitoneum or ascites. Bilateral common and external iliac lymphadenopathy measures up to 17 x 24 mm on the right on image 118. Several prominent but not pathologically enlarged inguinal lymph nodes are noted bilaterally. Aortoiliac atherosclerosis consists of calcified and noncalcified plaque. Diminutive inferior vena cava is poorly  visualized, suggesting chronic thrombosis, and contains an IVC filter. Osseous Structures:  Degenerative changes. Postoperative changes of spinal fusion utilizing posterior rods and pedicle screws which extend from T11 to L3 with evidence of discectomy and partial osseous fusion at T12-L1 as well as multilevel laminectomy. Probable bone graft donor site in left iliac bone. Intramedullary noemí in right femur is partially imaged with abundant heterotopic ossification noted adjacent to greater and lesser trochanters and proximal shaft of femur. See below. Additional Findings:  High-grade decubitus ulcers in bilateral buttocks expose portions of ischial tuberosities and inferior pubic rami with evidence of chronic osteomyelitis and partial bony destruction bilaterally. Diffuse fatty atrophy of musculature in hips and proximal lower extremities, possibly due to disuse.     Impression: IMPRESSION: 1.  Moderate to severe left renal atrophy with multifocal cortical scarring. Compensatory hypertrophy of right kidney. Heterogeneous or striated nephrograms could represent acute pyelonephritis, acute tubular necrosis or other pathology. 2.  Mild to moderate bladder wall thickening and mucosal hyperenhancement suggest cystitis or chronic bladder outlet obstruction. Small bladder diverticulum. Correlation with urinalysis is recommended. 3.  Hepatomegaly. Probable pseudolesion in medial segment of left lobe. Nonocclusive thrombus in branches of right hepatic vein. 4.  Status post left hemicolectomy with left abdominal colostomy and fat and bowel-containing parastomal hernia, as above. 5.  Left adrenal nodule, possibly adenoma but not characterized. 6.  Bilateral common and external iliac lymphadenopathy could be reactive or metastatic. Correlation with patient's clinical history is required. 7.  High-grade decubitus ulcers in bilateral buttocks with evidence of chronic osteomyelitis in bilateral ischial tuberosities and inferior  pubic rami. 8.  See additional findings above. Direct comparison with abdominopelvic CT from outside institution, if available, is recommended to confirm stability of multiple findings. Otherwise, abdominal MRI with and without contrast is recommended on a nonemergent outpatient basis for further evaluation.    X-RAY CHEST 1 VIEW  Result Date: 5/8/2025  Narrative: CLINICAL HISTORY:  54-year-old male with shortness of breath and hypoxia. COMPARISON:  None. TECHNIQUE:  A portable AP erect view of the chest is obtained. COMMENT: The heart is normal in size. Mediastinal contours are unremarkable. No pneumothorax or pleural effusion is seen. The lungs are well-inflated and appear grossly clear. Spinal fusion hardware extending from the lower thoracic spine into the lumbar spine is partially imaged.     Impression: IMPRESSION: No acute cardiopulmonary abnormality on frontal view of chest.    CT ANGIOGRAPHY CHEST PULMONARY EMBOLISM WITH IV CONTRAST  Result Date: 5/8/2025  Narrative: CLINICAL HISTORY:  54-year-old male with dyspnea on exertion. COMPARISON:  Radiograph of chest obtained earlier on the same date. TECHNIQUE:  CT angiography of the chest is performed following the uneventful administration of intravenous contrast according to the pulmonary embolism protocol. Coronal and sagittal reconstructions are obtained. Three-dimensional MIP and/or volume rendered image reconstruction is performed and reviewed. One or more dose reduction techniques (e.g., automated exposure control, adjustment of the mA and/or kV according to the patient size, use of iterative reconstruction technique) are utilized for this examination. CONTRAST:  100 mL Isovue-370 IV contrast COMMENT: Pulmonary Arteries:  Pulmonary arteries are adequately opacified by intravenous contrast, although bolus timing results in similar opacification of pulmonary arteries, pulmonary veins and thoracic aorta. No filling defects are seen from the level of the main  pulmonary artery to the level of the proximal subsegmental branches to suggest pulmonary embolism. RV/LV Ratio:  Not applicable. Aorta and Great Vessels:  No aortic aneurysm or evident dissection. Mild atherosclerotic changes consist of noncalcified plaque. Left vertebral artery arises separately from aortic arch, a normal anatomic variant. Heart:  Normal cardiac size. No pericardial effusion. Lungs:  Very mild pleural/parenchymal scarring in lung apices. Mild centrilobular emphysema. Mild to moderate paraseptal emphysema. No acute or lobar consolidation. Multifocal, mild scarring and/or subsegmental atelectasis in basilar lower lobes. Solid nodule in apical left upper lobe measures 3-4 mm on image 27 of axial lung 1.25 mm series. Central tracheobronchial tree is patent with probable, small amount of adherent secretions in tracheal lumen near haley. Pleura:  No pleural effusion or pneumothorax. Mary Anne, Mediastinum and Lymph Nodes:  No suspect mass or lymphadenopathy. 3 mm calcification in right lobe of thyroid gland on image 18 of axial standard 2.5 series is noted. Visualized Upper Abdomen:  No suspicious findings. Osseous Structures:  Mild thoracic kyphosis. Degenerative changes. Postoperative changes of spinal fusion utilizing posterior rods and pedicle screws which extend from T11 to L3 with evidence of discectomy and partial osseous fusion at T12-L1. Healed fractures of multiple right ribs. Additional Findings:  Paucity of subcutaneous fat.     Impression: IMPRESSION: 1.  No evidence of pulmonary embolism. 2.  No acute pulmonary abnormality. Mild centrilobular emphysema. Mild to moderate paraseptal emphysema. 3-4 mm nodule in left upper lobe, for which follow-up imaging is recommended as per patient risk factors and current Fleischner Society guidelines. 3.  See additional findings above. CC: Lung Nodule Navigator       ECG/Telemetry  I have independently reviewed the ECG. No significant findings.    ASSESSMENT  AND PLAN           Assessment & Plan  Cystitis  Presenting with reported fevers at home and intermittent nausea.  Found to have +3 leuk esterase in urine and bladder wall thickening on CT scan.  Also with a leukocytosis to 19K.  Well-appearing and remains afebrile since admission.    Moderate to severe left renal atrophy with multifocal cortical scarring.  Compensatory hypertrophy of right kidney. Heterogeneous or striated nephrograms  could represent acute pyelonephritis, acute tubular necrosis or other pathology.  Mild to moderate bladder wall thickening and mucosal hyperenhancement  suggest cystitis or chronic bladder outlet obstruction.      - Start ceftriaxone 1G, every 24 hours  - Continue NSS  mL/h  - Follow-up with urine cultures, pending  - Continue to monitor fever curve  - Continue to monitor leukocytosis on a.m. labs  - Infectious disease consulted, appreciate recommendations  Anemia  Hemoglobin 10.8 on admission.  No signs of overt bleeding.     - Check Iron and TIBC, pending  - Check ferritin, pending  Abnormal finding on CT scan  Hepatomegaly. Probable pseudolesion in medial segment of left lobe.  Nonocclusive thrombus in branches of right hepatic vein.    - Ultrasound of liver ordered for further evaluation, pending  Protein-calorie malnutrition (CMS/HCC)  Appears malnourished BMI of 18.56    - Nutritional services consulted, appreciate recommendations  Sacral wound  Chronic sacral wound.  Does not appear to be infected.    -  Wound care consulted  Personal history of DVT (deep vein thrombosis)  Stable.    - Has IVC filter in place       VTE Assessment: Padua    VTE Prophylaxis: Current anticoagulants:  enoxaparin (LOVENOX) syringe 40 mg, subcutaneous, Daily (6p)      Palliative Care Screen:    Code Status: Full Code  Estimated discharge date: 5/11/2025  Discussed advanced care planning. The patient was not able or did not want to name a surrogate decision maker. Mehnaz was unable to provide  an ACP.     ATTENDING DOCUMENTATION  ALSO SEE ATTENDING ATTESTATION SECTION OF NOTE

## 2025-05-09 NOTE — ASSESSMENT & PLAN NOTE
Hemoglobin 10.8 on admission.  No signs of overt bleeding.   - Hemoglobin down to 8.9 this morning post hydration    - Check Iron and TIBC, pending  - Check ferritin, pending

## 2025-05-09 NOTE — ASSESSMENT & PLAN NOTE
Hemoglobin 10.8 on admission.  No signs of overt bleeding.     - Check Iron and TIBC, pending  - Check ferritin, pending

## 2025-05-09 NOTE — PROGRESS NOTES
"Patient meeting ASPEN guidelines for minimum of 2 criteria, for Moderate Malnutrition in context of Chronic Illness as evidenced by estimated energy intakes meeting <75% of estimated needs for at least past month; mild-moderate loss of muscle mass and body fat (Temporalis, Clavicle, Deltoid, Trapezius, Biceps/Triceps, Ribs).      Nutrition Assessment    Goals:  Patient will consume at least 75% of estimated nutritional needs with PO intakes    Recommendation/Interventions:  Continue Regular diet  Added strawberry Glucerna BID, as discussed with Patient.  Only likes strawberry and the Ensures only come in chocolate and vanilla.  Encouraged high calorie/protein diet, tips to increase nutritional intakes and for wound healing.  Suggest MVI with minerals.  Discussed Patient's social concerns with Case Management, concern for Mom's well-being at home.  Will monitor PO intake, diet order, weights, medications, labs, skin integrity, plan of care.    Subjective:   Patient evaluated per Consult re: Malnutrition, Consult appreciated.  Patient also with low BMI, 18.56 kg/m2, however given L BKA his adjusted BMI is actually 19.57 kg/m2.  BMI also may not be fully accurate given Paraplegia and expected decreased muscle mass/bulk in BLE.  However, appears Underweight with loss of muscle mass and body fat in Upper Body.  Also admitted with chronic Sacral wound that appears Unstageable vs question Stage 4, per photo, and has Colostomy.  Wound RN consulted.  Patient admitted with UTI, discussed with this writer having poor PO, N/V due to the infection.  Discussed before illness, was eating \"but not a lot\", discussed often \"neglecting myself, taking care of my Mother\".  Expressed much concern with Mom being home at this time, but also expressed \"time to care for me\".  Discussed having 2 sisters who are unable to support, assist, \"one in [place], one in [place], they were drug abusers\".  Patient appears Underweight, with visible loss " "of muscle mass and body fat in upper body including around Clavicle, Deltoid, Trapezius, Biceps/Triceps, Ribs.  Lower body deferred, has history of L BKA, and given Paraplegia, expect decreased muscle bulk/mass in BLE.  Wheelchair bound.  Patient ate about 25% of omelet this AM when visited, asking for Lemonade to drink that brought in from home, assisted Patient.  Limited Encounters in EMR for weights over past 2 years.  Patient with very high calorie/protein needs and demands, intakes inadequate.  Agreeable to drinking strawberry oral nutrition supplements.     Adult Nutrition Assessment - 05/09/25 1000          Charting Type    Nutrition Charting Type Nutrition Assessment     RD Assessment Date 05/09/25     Nutrition Status Classification Severe nutritional compromise     RD Preferred Follow Up Date 5/10-5/13     Time Spent (Minutes) 45        Reason for Assessment    Reason For Assessment physician consult;identified at risk by screening criteria   Malnutrition; low BMI; wound    Diagnosis infection/sepsis        Nutrition/Diet History    Appetite Prior to Admission Other (comment)   poor-fair    Intake (%) other (see comments)   ate about 25% of omelet this AM when seen    Factors Affecting Nutritional Intake nausea;socio-economic   infection, decreased appetite       Anthropometrics    Height 1.676 m (5' 5.98\")        Current Weight    Weight 52.2 kg (115 lb)   5/8       Ideal Body Weight (IBW)    Ideal Body Weight (IBW) (kg) 65.26     % Ideal Body Weight 79.93        Body Mass Index (BMI)    BMI (Calculated) 18.6     BMI Assessment other (see comments) (P)    adjusted BMI 19.57 kg/m2 with L BKA; but appears Underweight    Nutritional Status/Malnutrition Chronic Illness - Non-Severe (Moderate Malnutrition) (P)         Malnutrition (Moderate to Severe)    Inadequate Oral Intake less than 75% for greater than or equal to 1 month (P)      Moderate Depletion (Body Fat) tricep/bicep fat pad (P)    Ribs    Moderate " Depletion (Muscle Mass) temporalis;deltoid;trapezius (P)    Clavicle       IBW Adjustment, Para/Tetraplegia    IBW Adjustment, Para/Tetraplegia 5% adjustment, para (P)      5% Adjustment, Para (IBW) 62     10% Adjustment, Para (IBW) 58.73     10% Adjustment, Tetra (IBW) 58.73     15% Adjustment, Tetra (IBW) 55.47        Ideal Body Weight (IBW), Amputee    Amputee Ideal Body Weight Assessment 5.9-->lower leg and foot, left 5.9% (P)      Amputee Ideal Body Weight (IBW) Est (kg) 61.4 (P)         Labs/Procedures/Meds    Lab Results Reviewed reviewed, pertinent (P)      Lab Results Comments Na 135 (low), K+ 3.4 (low), Magnesium 1.7 (low) (P)         Diagnostic Tests/Procedures    Diagnostic Test/Procedure Reviewed reviewed (P)         Medications    Pertinent Medications Reviewed reviewed, pertinent (P)      Pertinent Medications Comments Magnesium Sulfate, Rocephin, IVF, Oxycodone (P)         Physical Findings    Overall Physical Appearance loss of muscle mass;loss of subcutaneous fat;underweight;other (see comments) (P)    Paraplegia, wheelchair bound    Gastrointestinal colostomy (P)      Skin pressure injury (P)    Sacral wound, ? Unstageable vs Stage 4 per photo       Calorie Requirements    Estimated kCal Needs Actual Body Weight (P)      Estimated Calorie Need Method kcal/kg (P)      Calorie/kg Recommended 30-35 (P)      Calorie Recommendations 4843-8432 (P)         KCAL/KG    20 Kcal/Kg (kcal) 1043.28     25 Kcal/Kg (kcal) 1304.1     30 Kcal/Kg (kcal) 1564.92     35 Kcal/Kg (kcal) 1825.74     40 Kcal/Kg (kcal) 2086.56     45 Kcal/Kg (kcal) 2347.38     50 Kcal/Kg (kcal) 2608.2        [place]-St. Jeor Equation    RMR ([place]-St. Jeor Equation) 1314.14        Protein Requirements    Recommended Dosing Weight (Estimated Protein Needs) Actual Body Weight (P)      Est Protein Requirement Amount (gms/kg) 1.6-->1.6 gm protein (P)      Estimated Protein Requirements (gms/day) 83.46 (P)      Protein Recommendations 73-83  (P)    1.4-1.6       Fluid Requirements    Fluid Recommendation (mL) 30-35ml (P)      Recommended Fluid Needs Dosing Weight Actual Body Weight (P)      Fluid Requirements (mL/day) 5374-4047 (P)      Coward-Bentleyar Method (over 20 kg) 2543.28        Nutrition Order    Nutrition Order meets nutritional requirements (P)      Nutrition Order Comments Regular (P)         PES Statement    Nutrition Diagnosis Malnutrition (P)    Moderate Chronic    Related To: Decreased ability to consume sufficient energy (P)      As Evidenced By: estimated energy intakes meeting <75% of estimated needs for at least past month; moderate loss of muscle mass and body fat (P)      Nutritional Needs Met? No (P)

## 2025-05-10 ENCOUNTER — APPOINTMENT (INPATIENT)
Dept: RADIOLOGY | Facility: HOSPITAL | Age: 55
DRG: 871 | End: 2025-05-10
Attending: HOSPITALIST
Payer: OTHER MISCELLANEOUS

## 2025-05-10 LAB
ANION GAP SERPL CALC-SCNC: 7 MEQ/L (ref 3–15)
BUN SERPL-MCNC: 11 MG/DL (ref 7–25)
CALCIUM SERPL-MCNC: 7.9 MG/DL (ref 8.6–10.3)
CHLORIDE SERPL-SCNC: 113 MEQ/L (ref 98–107)
CO2 SERPL-SCNC: 19 MEQ/L (ref 21–31)
CREAT SERPL-MCNC: 0.8 MG/DL (ref 0.7–1.3)
EGFRCR SERPLBLD CKD-EPI 2021: >60 ML/MIN/1.73M*2
ERYTHROCYTE [DISTWIDTH] IN BLOOD BY AUTOMATED COUNT: 16.3 % (ref 11.6–14.4)
GLUCOSE SERPL-MCNC: 93 MG/DL (ref 70–99)
HCT VFR BLD AUTO: 28.5 % (ref 40.1–51)
HGB BLD-MCNC: 9 G/DL (ref 13.7–17.5)
MAGNESIUM SERPL-MCNC: 2 MG/DL (ref 1.8–2.5)
MCH RBC QN AUTO: 29.8 PG (ref 28–33.2)
MCHC RBC AUTO-ENTMCNC: 31.6 G/DL (ref 32.2–36.5)
MCV RBC AUTO: 94.4 FL (ref 83–98)
PLATELET # BLD AUTO: 478 K/UL (ref 150–350)
PMV BLD AUTO: 8.8 FL (ref 9.4–12.4)
POTASSIUM SERPL-SCNC: 3.7 MEQ/L (ref 3.5–5.1)
RBC # BLD AUTO: 3.02 M/UL (ref 4.5–5.8)
SODIUM SERPL-SCNC: 139 MEQ/L (ref 136–145)
WBC # BLD AUTO: 10.48 K/UL (ref 3.8–10.5)

## 2025-05-10 PROCEDURE — 63700000 HC SELF-ADMINISTRABLE DRUG: Performed by: NURSE PRACTITIONER

## 2025-05-10 PROCEDURE — 21400000 HC ROOM AND CARE CCU/INTERMEDIATE

## 2025-05-10 PROCEDURE — 85027 COMPLETE CBC AUTOMATED: CPT | Performed by: HOSPITALIST

## 2025-05-10 PROCEDURE — 86146 BETA-2 GLYCOPROTEIN ANTIBODY: CPT | Performed by: PHYSICIAN ASSISTANT

## 2025-05-10 PROCEDURE — 70250 X-RAY EXAM OF SKULL: CPT

## 2025-05-10 PROCEDURE — 63700000 HC SELF-ADMINISTRABLE DRUG: Performed by: HOSPITALIST

## 2025-05-10 PROCEDURE — 70360 X-RAY EXAM OF NECK: CPT

## 2025-05-10 PROCEDURE — 80048 BASIC METABOLIC PNL TOTAL CA: CPT | Performed by: HOSPITALIST

## 2025-05-10 PROCEDURE — 63600000 HC DRUGS/DETAIL CODE: Mod: JZ | Performed by: STUDENT IN AN ORGANIZED HEALTH CARE EDUCATION/TRAINING PROGRAM

## 2025-05-10 PROCEDURE — 86147 CARDIOLIPIN ANTIBODY EA IG: CPT | Performed by: PHYSICIAN ASSISTANT

## 2025-05-10 PROCEDURE — 99233 SBSQ HOSP IP/OBS HIGH 50: CPT | Performed by: HOSPITALIST

## 2025-05-10 PROCEDURE — 87086 URINE CULTURE/COLONY COUNT: CPT | Performed by: HOSPITALIST

## 2025-05-10 PROCEDURE — 25800000 HC PHARMACY IV SOLUTIONS: Performed by: NURSE PRACTITIONER

## 2025-05-10 PROCEDURE — 83735 ASSAY OF MAGNESIUM: CPT | Performed by: HOSPITALIST

## 2025-05-10 PROCEDURE — 72040 X-RAY EXAM NECK SPINE 2-3 VW: CPT

## 2025-05-10 PROCEDURE — 36415 COLL VENOUS BLD VENIPUNCTURE: CPT | Performed by: HOSPITALIST

## 2025-05-10 PROCEDURE — 63600000 HC DRUGS/DETAIL CODE: Mod: JW | Performed by: PHYSICIAN ASSISTANT

## 2025-05-10 PROCEDURE — 25800000 HC PHARMACY IV SOLUTIONS: Performed by: STUDENT IN AN ORGANIZED HEALTH CARE EDUCATION/TRAINING PROGRAM

## 2025-05-10 RX ORDER — TRAZODONE HYDROCHLORIDE 50 MG/1
25 TABLET ORAL ONCE
Status: COMPLETED | OUTPATIENT
Start: 2025-05-10 | End: 2025-05-10

## 2025-05-10 RX ADMIN — OXYCODONE HYDROCHLORIDE 5 MG: 5 TABLET ORAL at 04:16

## 2025-05-10 RX ADMIN — SODIUM CHLORIDE: 9 INJECTION, SOLUTION INTRAVENOUS at 06:24

## 2025-05-10 RX ADMIN — COLLAGENASE SANTYL 1 APPLICATION: 250 OINTMENT TOPICAL at 09:43

## 2025-05-10 RX ADMIN — ENOXAPARIN SODIUM 50 MG: 100 INJECTION SUBCUTANEOUS at 22:57

## 2025-05-10 RX ADMIN — ENOXAPARIN SODIUM 50 MG: 100 INJECTION SUBCUTANEOUS at 12:08

## 2025-05-10 RX ADMIN — OXYCODONE HYDROCHLORIDE 5 MG: 5 TABLET ORAL at 22:56

## 2025-05-10 RX ADMIN — OXYCODONE HYDROCHLORIDE 5 MG: 5 TABLET ORAL at 18:31

## 2025-05-10 RX ADMIN — CEFTAZIDIME 2 G: 2 INJECTION, POWDER, FOR SOLUTION INTRAVENOUS at 06:24

## 2025-05-10 RX ADMIN — OXYCODONE HYDROCHLORIDE 5 MG: 5 TABLET ORAL at 09:42

## 2025-05-10 RX ADMIN — CEFTAZIDIME 2 G: 2 INJECTION, POWDER, FOR SOLUTION INTRAVENOUS at 22:57

## 2025-05-10 RX ADMIN — TRAZODONE HYDROCHLORIDE 25 MG: 50 TABLET ORAL at 22:56

## 2025-05-10 RX ADMIN — CEFTAZIDIME 2 G: 2 INJECTION, POWDER, FOR SOLUTION INTRAVENOUS at 15:31

## 2025-05-10 ASSESSMENT — COGNITIVE AND FUNCTIONAL STATUS - GENERAL
CLIMB 3 TO 5 STEPS WITH RAILING: 1 - TOTAL
STANDING UP FROM CHAIR USING ARMS: 1 - TOTAL
WALKING IN HOSPITAL ROOM: 1 - TOTAL
CLIMB 3 TO 5 STEPS WITH RAILING: 1 - TOTAL
MOVING TO AND FROM BED TO CHAIR: 2 - A LOT
STANDING UP FROM CHAIR USING ARMS: 1 - TOTAL
WALKING IN HOSPITAL ROOM: 1 - TOTAL
MOVING TO AND FROM BED TO CHAIR: 1 - TOTAL

## 2025-05-10 NOTE — ASSESSMENT & PLAN NOTE
Hemoglobin 10.8 on admission.  No signs of overt bleeding.   - Hemoglobin stable around 9.0 post hydration  - Ferritin levels normal.  -Anemia of chronic disease/inflammation?  - Monitor while in hospital and outpatient follow-up recommended

## 2025-05-10 NOTE — PLAN OF CARE
Plan of Care Review  Plan of Care Reviewed With: patient  Progress: improving  Outcome Evaluation: Pt tolerating diet has more of an appetite per the pt without nausea, pain controlled with PRN oxy 5mg, wound care provided, pain med admin prior to wound care. Tolerating IV abx, heart monitor maintained

## 2025-05-10 NOTE — ASSESSMENT & PLAN NOTE
Hepatomegaly. Probable pseudolesion in medial segment of left lobe.  Nonocclusive thrombus in branches of right hepatic vein.    - hematology consulted: Appreciate recs.  Checking MRI of abdomen with contrast  -For now he has been started on full dose Lovenox per hematology recs and anticipate transition to DOAC on discharge.  -Follow MRI abdomen results

## 2025-05-10 NOTE — ASSESSMENT & PLAN NOTE
Presenting with reported fevers at home and intermittent nausea.  Found to have +3 leuk esterase in urine and bladder wall thickening on CT scan.  Also with a leukocytosis to 19K.  Well-appearing and remains afebrile since admission.    Moderate to severe left renal atrophy with multifocal cortical scarring.  Compensatory hypertrophy of right kidney. Heterogeneous or striated nephrograms  could represent acute pyelonephritis, acute tubular necrosis or other pathology.  Mild to moderate bladder wall thickening and mucosal hyperenhancement  suggest cystitis or chronic bladder outlet obstruction.      - Continue ceftazidime per ID recs  - Status post IV fluids  - Urine culture contaminated so we will repeat.  Prelim blood cultures so far negative.  - Continue to monitor fever curve  - Leukocytosis now resolved  - Infectious disease consulted, appreciate recommendations

## 2025-05-10 NOTE — ASSESSMENT & PLAN NOTE
Appears malnourished BMI of 18.56  - Nutritional services consulted, appreciate recommendations  -Moderate protein calorie malnutrition

## 2025-05-10 NOTE — PROGRESS NOTES
[facility]     Daily Progress Note                SUBJECTIVE   Interval History: Patient seen and examined earlier this morning. Reports feeling better.  Denied any chest pain or shortness of breath, no abdominal pain, no nausea/vomiting symptoms.  No diarrhea.  No fever/chills.     OBJECTIVE      Vital signs in last 24 hours:  Temp:  [36.6 °C (97.8 °F)-36.8 °C (98.3 °F)] 36.6 °C (97.8 °F)  Heart Rate:  [63-75] 63  Resp:  [16-18] 18  BP: (128-157)/(63-72) 133/63    Intake/Output Summary (Last 24 hours) at 5/10/2025 1052  Last data filed at 5/10/2025 0624  Gross per 24 hour   Intake 3841.67 ml   Output 3000 ml   Net 841.67 ml         PHYSICAL EXAMINATION      Physical Exam  Constitutional:       General: He is not in acute distress.     Appearance: Normal appearance. He is not ill-appearing.   HENT:      Head: Normocephalic and atraumatic.   Cardiovascular:      Rate and Rhythm: Normal rate and regular rhythm.      Pulses: Normal pulses.      Heart sounds: Normal heart sounds.   Pulmonary:      Effort: Pulmonary effort is normal.      Breath sounds: Normal breath sounds.   Abdominal:      General: Abdomen is flat.      Palpations: Abdomen is soft. NT, +BS     Comments: Ostomy bag in place   Musculoskeletal:         General: No swelling or tenderness. Normal range of motion.      Right lower leg: No edema.      Left lower leg: No edema.      Comments: Left BKA.   Skin:     General: Skin is warm and dry.      Comments: stage IV sacral decubitus ulcers   Neurological:      Mental Status: He is alert.   Psychiatric:         Mood and Affect: Mood normal.         Behavior: Behavior normal.         LINES, CATHETERS, DRAINS, AIRWAYS, AND WOUNDS   Lines, Drains, and Airways:  Wounds (agree with documentation and present on admission):  Peripheral IV (Adult) 05/08/25 Right Antecubital (Active)   Number of days: 1       Wound Pressure Injury Buttock (Active)   Number of days: 1         Comments:    LABS / IMAGING /  TELE      Labs  Reviewed  Results from last 7 days   Lab Units 05/10/25  0400 05/09/25  0418 05/08/25  1652   WBC K/uL 10.48 12.34* 19.30*   HEMOGLOBIN g/dL 9.0* 8.9* 10.8*   HEMATOCRIT % 28.5* 28.5* 35.2*   PLATELETS K/uL 478* 451* 576*      Results from last 7 days   Lab Units 05/10/25  0400 05/09/25  0418 05/08/25  1652   SODIUM mEQ/L 139 135* 135*   POTASSIUM mEQ/L 3.7 3.4* 3.6   CHLORIDE mEQ/L 113* 110* 106   CO2 mEQ/L 19* 16* 17*   BUN mg/dL 11 13 18   CREATININE mg/dL 0.8 0.9 1.0   GLUCOSE mg/dL 93 85 101*   CALCIUM mg/dL 7.9* 7.9* 9.4          Imaging  I have independently reviewed the pertinent imaging from the last 24 hrs.    ECG/Telemetry  Normal sinus rhythm on telemetry    ASSESSMENT AND PLAN        Assessment & Plan  Cystitis  Presenting with reported fevers at home and intermittent nausea.  Found to have +3 leuk esterase in urine and bladder wall thickening on CT scan.  Also with a leukocytosis to 19K.  Well-appearing and remains afebrile since admission.    Moderate to severe left renal atrophy with multifocal cortical scarring.  Compensatory hypertrophy of right kidney. Heterogeneous or striated nephrograms  could represent acute pyelonephritis, acute tubular necrosis or other pathology.  Mild to moderate bladder wall thickening and mucosal hyperenhancement  suggest cystitis or chronic bladder outlet obstruction.      - Continue ceftazidime per ID recs  - Status post IV fluids  - Urine culture contaminated so we will repeat.  Prelim blood cultures so far negative.  - Continue to monitor fever curve  - Leukocytosis now resolved  - Infectious disease consulted, appreciate recommendations  Anemia  Hemoglobin 10.8 on admission.  No signs of overt bleeding.   - Hemoglobin stable around 9.0 post hydration  - Ferritin levels normal.  -Anemia of chronic disease/inflammation?  - Monitor while in hospital and outpatient follow-up recommended  Abnormal finding on CT scan  Hepatomegaly. Probable pseudolesion in  medial segment of left lobe.  Nonocclusive thrombus in branches of right hepatic vein.    - hematology consulted: Appreciate recs.  Checking MRI of abdomen with contrast  -For now he has been started on full dose Lovenox per hematology recs and anticipate transition to DOAC on discharge.  -Follow MRI abdomen results  Protein-calorie malnutrition (CMS/HCC)  Appears malnourished BMI of 18.56  - Nutritional services consulted, appreciate recommendations  -Moderate protein calorie malnutrition  Sacral wound  Chronic sacral wounds.  No evidence of acute infection  CT: High-grade decubitus ulcers in bilateral buttocks with evidence of chronic   osteomyelitis in bilateral ischial tuberosities and inferior pubic rami.   ID consulted and Wound care consulted  Personal history of DVT (deep vein thrombosis)       -Has IVC filter in place       -CT abdomen pelvis with IV contrast: Nonocclusive thrombus in branches of right hepatic vein.        - Hematology consulted: Appreciate recommendations       - Currently on full dose Lovenox per hematology recommendations.  Anticipate transition to DOAC on discharge.  ?Hypercoagulable workup per hematology  Electrolyte abnormality  Mild hypokalemia and hypomagnesemia  Repleted and monitor  Abnormal CT scan  3-4 mm nodule in left upper lobe: Outpatient follow-up for repeat imaging recommended  Bilateral common and external iliac lymphadenopathy could be reactive or   Metastatic -hematology consulted  Will need outpatient follow-up.  This was discussed with patient      VTE Assessment: Padua    VTE Prophylaxis:  Current anticoagulants:  enoxaparin (LOVENOX) syringe 50 mg, subcutaneous, q12h (6a, 6p)      Code Status: Full Code      Estimated Discharge Date: 5/11/2025   Disposition Planning: TBD pending clinical course    Spent 50 minutes on patient's clinical care reviewing chart, addressing his active medical issues, discussing with consultants and other care team, updating patient and  family.          Tex Waters MD  5/10/2025

## 2025-05-10 NOTE — ASSESSMENT & PLAN NOTE
-Has IVC filter in place       -CT abdomen pelvis with IV contrast: Nonocclusive thrombus in branches of right hepatic vein.        - Hematology consulted: Appreciate recommendations       - Currently on full dose Lovenox per hematology recommendations.  Anticipate transition to DOAC on discharge.  ?Hypercoagulable workup per hematology

## 2025-05-10 NOTE — ASSESSMENT & PLAN NOTE
Chronic sacral wounds.  No evidence of acute infection  CT: High-grade decubitus ulcers in bilateral buttocks with evidence of chronic   osteomyelitis in bilateral ischial tuberosities and inferior pubic rami.   ID consulted and Wound care consulted

## 2025-05-11 LAB
ANION GAP SERPL CALC-SCNC: 5 MEQ/L (ref 3–15)
BACTERIA UR CULT: NORMAL
BUN SERPL-MCNC: 10 MG/DL (ref 7–25)
CALCIUM SERPL-MCNC: 7.7 MG/DL (ref 8.6–10.3)
CHLORIDE SERPL-SCNC: 112 MEQ/L (ref 98–107)
CO2 SERPL-SCNC: 23 MEQ/L (ref 21–31)
CREAT SERPL-MCNC: 0.7 MG/DL (ref 0.7–1.3)
EGFRCR SERPLBLD CKD-EPI 2021: >60 ML/MIN/1.73M*2
ERYTHROCYTE [DISTWIDTH] IN BLOOD BY AUTOMATED COUNT: 16.2 % (ref 11.6–14.4)
GLUCOSE SERPL-MCNC: 100 MG/DL (ref 70–99)
HCT VFR BLD AUTO: 26 % (ref 40.1–51)
HGB BLD-MCNC: 8.3 G/DL (ref 13.7–17.5)
MCH RBC QN AUTO: 29.3 PG (ref 28–33.2)
MCHC RBC AUTO-ENTMCNC: 31.9 G/DL (ref 32.2–36.5)
MCV RBC AUTO: 91.9 FL (ref 83–98)
PLATELET # BLD AUTO: 437 K/UL (ref 150–350)
PMV BLD AUTO: 8.4 FL (ref 9.4–12.4)
POTASSIUM SERPL-SCNC: 3.6 MEQ/L (ref 3.5–5.1)
RBC # BLD AUTO: 2.83 M/UL (ref 4.5–5.8)
SODIUM SERPL-SCNC: 140 MEQ/L (ref 136–145)
WBC # BLD AUTO: 9.34 K/UL (ref 3.8–10.5)

## 2025-05-11 PROCEDURE — 36415 COLL VENOUS BLD VENIPUNCTURE: CPT | Performed by: HOSPITALIST

## 2025-05-11 PROCEDURE — 63600000 HC DRUGS/DETAIL CODE: Mod: JZ | Performed by: STUDENT IN AN ORGANIZED HEALTH CARE EDUCATION/TRAINING PROGRAM

## 2025-05-11 PROCEDURE — 85732 THROMBOPLASTIN TIME PARTIAL: CPT | Performed by: PHYSICIAN ASSISTANT

## 2025-05-11 PROCEDURE — 63700000 HC SELF-ADMINISTRABLE DRUG

## 2025-05-11 PROCEDURE — 85612 VIPER VENOM PROTHROMBIN TIME: CPT | Performed by: PHYSICIAN ASSISTANT

## 2025-05-11 PROCEDURE — 99232 SBSQ HOSP IP/OBS MODERATE 35: CPT | Performed by: HOSPITALIST

## 2025-05-11 PROCEDURE — 80048 BASIC METABOLIC PNL TOTAL CA: CPT | Performed by: HOSPITALIST

## 2025-05-11 PROCEDURE — 85027 COMPLETE CBC AUTOMATED: CPT | Performed by: HOSPITALIST

## 2025-05-11 PROCEDURE — 25800000 HC PHARMACY IV SOLUTIONS: Performed by: STUDENT IN AN ORGANIZED HEALTH CARE EDUCATION/TRAINING PROGRAM

## 2025-05-11 PROCEDURE — 63700000 HC SELF-ADMINISTRABLE DRUG: Performed by: HOSPITALIST

## 2025-05-11 PROCEDURE — 25800000 HC PHARMACY IV SOLUTIONS: Performed by: NURSE PRACTITIONER

## 2025-05-11 PROCEDURE — 63600000 HC DRUGS/DETAIL CODE: Performed by: PHYSICIAN ASSISTANT

## 2025-05-11 PROCEDURE — 63700000 HC SELF-ADMINISTRABLE DRUG: Performed by: STUDENT IN AN ORGANIZED HEALTH CARE EDUCATION/TRAINING PROGRAM

## 2025-05-11 PROCEDURE — 85598 HEXAGNAL PHOSPH PLTLT NEUTRL: CPT | Performed by: PHYSICIAN ASSISTANT

## 2025-05-11 PROCEDURE — 21400000 HC ROOM AND CARE CCU/INTERMEDIATE

## 2025-05-11 RX ORDER — ONDANSETRON 4 MG/1
4 TABLET, ORALLY DISINTEGRATING ORAL EVERY 8 HOURS PRN
Status: DISCONTINUED | OUTPATIENT
Start: 2025-05-11 | End: 2025-05-13

## 2025-05-11 RX ORDER — ONDANSETRON HYDROCHLORIDE 2 MG/ML
4 INJECTION, SOLUTION INTRAVENOUS EVERY 8 HOURS PRN
Status: DISCONTINUED | OUTPATIENT
Start: 2025-05-11 | End: 2025-05-13

## 2025-05-11 RX ORDER — TRAZODONE HYDROCHLORIDE 50 MG/1
25 TABLET ORAL NIGHTLY PRN
Status: DISCONTINUED | OUTPATIENT
Start: 2025-05-11 | End: 2025-05-14 | Stop reason: HOSPADM

## 2025-05-11 RX ADMIN — ONDANSETRON 4 MG: 4 TABLET, ORALLY DISINTEGRATING ORAL at 05:46

## 2025-05-11 RX ADMIN — CEFTAZIDIME 2 G: 2 INJECTION, POWDER, FOR SOLUTION INTRAVENOUS at 22:47

## 2025-05-11 RX ADMIN — SODIUM CHLORIDE: 9 INJECTION, SOLUTION INTRAVENOUS at 05:48

## 2025-05-11 RX ADMIN — OXYCODONE HYDROCHLORIDE 5 MG: 5 TABLET ORAL at 12:30

## 2025-05-11 RX ADMIN — COLLAGENASE SANTYL 1 APPLICATION: 250 OINTMENT TOPICAL at 10:07

## 2025-05-11 RX ADMIN — TRAZODONE HYDROCHLORIDE 25 MG: 50 TABLET ORAL at 22:47

## 2025-05-11 RX ADMIN — ACETAMINOPHEN 650 MG: 325 TABLET ORAL at 22:47

## 2025-05-11 RX ADMIN — ENOXAPARIN SODIUM 50 MG: 100 INJECTION SUBCUTANEOUS at 12:27

## 2025-05-11 RX ADMIN — ENOXAPARIN SODIUM 50 MG: 100 INJECTION SUBCUTANEOUS at 22:47

## 2025-05-11 RX ADMIN — CEFTAZIDIME 2 G: 2 INJECTION, POWDER, FOR SOLUTION INTRAVENOUS at 14:38

## 2025-05-11 RX ADMIN — ACETAMINOPHEN 650 MG: 325 TABLET ORAL at 02:02

## 2025-05-11 RX ADMIN — CEFTAZIDIME 2 G: 2 INJECTION, POWDER, FOR SOLUTION INTRAVENOUS at 05:46

## 2025-05-11 RX ADMIN — OXYCODONE HYDROCHLORIDE 5 MG: 5 TABLET ORAL at 05:46

## 2025-05-11 RX ADMIN — OXYCODONE HYDROCHLORIDE 5 MG: 5 TABLET ORAL at 19:59

## 2025-05-11 ASSESSMENT — COGNITIVE AND FUNCTIONAL STATUS - GENERAL
WALKING IN HOSPITAL ROOM: 1 - TOTAL
CLIMB 3 TO 5 STEPS WITH RAILING: 1 - TOTAL
STANDING UP FROM CHAIR USING ARMS: 1 - TOTAL
WALKING IN HOSPITAL ROOM: 1 - TOTAL
MOVING TO AND FROM BED TO CHAIR: 1 - TOTAL
MOVING TO AND FROM BED TO CHAIR: 1 - TOTAL
CLIMB 3 TO 5 STEPS WITH RAILING: 1 - TOTAL
STANDING UP FROM CHAIR USING ARMS: 1 - TOTAL

## 2025-05-11 NOTE — NURSING NOTE
Pt transported to MRI at 1920. MRI reported pt unable to tolerate MRI at 1940. Pt returned to room.

## 2025-05-11 NOTE — PROGRESS NOTES
[facility] Medicine     Daily Progress Note                SUBJECTIVE   Interval History: Patient seen and examined earlier this morning.  No acute complaints.  Denied any chest pain or shortness of breath, no abdominal pain, no nausea/vomiting symptoms.  No diarrhea.  No fever/chills.  No bright red blood per rectum or melena  Was not able to tolerate MRI yesterday.  Offered IV Ativan to help with anxiety during study but refused to do it today and stated he will consider doing it tomorrow  Hemoglobin noted to be 8.3 this morning     OBJECTIVE      Vital signs in last 24 hours:  Temp:  [36.2 °C (97.2 °F)-36.8 °C (98.3 °F)] 36.7 °C (98 °F)  Heart Rate:  [62-80] 69  Resp:  [12-18] 16  BP: (101-149)/(55-76) 124/62    Intake/Output Summary (Last 24 hours) at 5/11/2025 1258  Last data filed at 5/11/2025 1134  Gross per 24 hour   Intake 3570.42 ml   Output 2475 ml   Net 1095.42 ml         PHYSICAL EXAMINATION      Physical Exam  Constitutional:       General: He is not in acute distress.     Appearance: Normal appearance. He is not ill-appearing.   HENT:      Head: Normocephalic and atraumatic.   Cardiovascular:      Rate and Rhythm: Normal rate and regular rhythm.      Pulses: Normal pulses.      Heart sounds: Normal heart sounds.   Pulmonary:      Effort: Pulmonary effort is normal.      Breath sounds: Normal breath sounds.   Abdominal:      General: Abdomen is flat.      Palpations: Abdomen is soft. NT, +BS     Comments: Ostomy bag in place   Musculoskeletal:         General: No swelling or tenderness. Normal range of motion.      Right lower leg: No edema.      Left lower leg: No edema.      Comments: Left BKA.   Skin:     General: Skin is warm and dry.      Comments: stage IV sacral decubitus ulcers   Neurological:      Mental Status: He is alert.   Psychiatric:         Mood and Affect: Mood normal.         Behavior: Behavior normal.         LINES, CATHETERS, DRAINS, AIRWAYS, AND WOUNDS   Lines, Drains, and  Airways:  Wounds (agree with documentation and present on admission):  Peripheral IV (Adult) 05/08/25 Right Antecubital (Active)   Number of days: 1       Wound Pressure Injury Buttock (Active)   Number of days: 1         Comments:    LABS / IMAGING / TELE      Labs  Reviewed  Results from last 7 days   Lab Units 05/11/25  0501 05/10/25  0400 05/09/25  0418   WBC K/uL 9.34 10.48 12.34*   HEMOGLOBIN g/dL 8.3* 9.0* 8.9*   HEMATOCRIT % 26.0* 28.5* 28.5*   PLATELETS K/uL 437* 478* 451*      Results from last 7 days   Lab Units 05/11/25  0501 05/10/25  0400 05/09/25  0418   SODIUM mEQ/L 140 139 135*   POTASSIUM mEQ/L 3.6 3.7 3.4*   CHLORIDE mEQ/L 112* 113* 110*   CO2 mEQ/L 23 19* 16*   BUN mg/dL 10 11 13   CREATININE mg/dL 0.7 0.8 0.9   GLUCOSE mg/dL 100* 93 85   CALCIUM mg/dL 7.7* 7.9* 7.9*          Imaging  I have independently reviewed the pertinent imaging from the last 24 hrs.    ECG/Telemetry  Normal sinus rhythm on telemetry    ASSESSMENT AND PLAN        Assessment & Plan  Cystitis  Presenting with reported fevers at home and intermittent nausea.  Found to have +3 leuk esterase in urine and bladder wall thickening on CT scan.  Also with a leukocytosis to 19K.  Well-appearing and remains afebrile since admission.    Moderate to severe left renal atrophy with multifocal cortical scarring.  Compensatory hypertrophy of right kidney. Heterogeneous or striated nephrograms  could represent acute pyelonephritis, acute tubular necrosis or other pathology.  Mild to moderate bladder wall thickening and mucosal hyperenhancement  suggest cystitis or chronic bladder outlet obstruction.      - Continue ceftazidime per ID recs  - Status post IV fluids  - Urine culture contaminated so we will repeat.  Prelim blood cultures so far negative.  - Continue to monitor fever curve  - Leukocytosis now resolved  - Infectious disease consulted, appreciate recommendations  Anemia  Hemoglobin 10.8 on admission.  No signs of overt bleeding.    - Hemoglobin down to 8.3 this morning.  Reflective of volume status as he has been getting IV hydration?  Will follow repeat CBC in a.m.  - Ferritin levels normal.  -Anemia of chronic disease/inflammation?  - Monitor while in hospital and outpatient follow-up recommended  Abnormal finding on CT scan  Hepatomegaly. Probable pseudolesion in medial segment of left lobe.  Nonocclusive thrombus in branches of right hepatic vein.    - hematology consulted: Appreciate recs.  Checking MRI of abdomen with contrast  -For now he has been started on full dose Lovenox per hematology recs and anticipate transition to DOAC on discharge.  - MRI abdomen with contrast ordered and pending  Protein-calorie malnutrition (CMS/HCC)  Appears malnourished BMI of 18.56  - Nutritional services consulted, appreciate recommendations  -Moderate protein calorie malnutrition  Sacral wound  Chronic sacral wounds.  No evidence of acute infection  CT: High-grade decubitus ulcers in bilateral buttocks with evidence of chronic   osteomyelitis in bilateral ischial tuberosities and inferior pubic rami.   ID consulted and Wound care consulted  Personal history of DVT (deep vein thrombosis)       -Has IVC filter in place       -CT abdomen pelvis with IV contrast: Nonocclusive thrombus in branches of right hepatic vein.        - Hematology consulted: Appreciate recommendations       - Currently on full dose Lovenox per hematology recommendations.  Anticipate transition to DOAC on discharge.  ?Hypercoagulable workup per hematology  Electrolyte abnormality  Mild hypokalemia and hypomagnesemia  Repleted and monitor  Abnormal CT scan  3-4 mm nodule in left upper lobe: Outpatient follow-up for repeat imaging recommended  Bilateral common and external iliac lymphadenopathy could be reactive or   Metastatic -hematology consulted  Will need outpatient follow-up.  This was discussed with patient      VTE Assessment: Padua    VTE Prophylaxis:  Current  anticoagulants:  enoxaparin (LOVENOX) syringe 50 mg, subcutaneous, q12h (6a, 6p)      Code Status: Full Code      Estimated Discharge Date: 5/12/2025   Disposition Planning: TBD pending clinical course    Spent 50 minutes on patient's clinical care reviewing chart, addressing his active medical issues, discussing with consultants and other care team, updating patient and family.          Tex Waters MD  5/11/2025

## 2025-05-11 NOTE — ASSESSMENT & PLAN NOTE
Hemoglobin 10.8 on admission.  No signs of overt bleeding.   - Hemoglobin down to 8.3 this morning.  Reflective of volume status as he has been getting IV hydration?  Will follow repeat CBC in a.m.  - Ferritin levels normal.  -Anemia of chronic disease/inflammation?  - Monitor while in hospital and outpatient follow-up recommended

## 2025-05-11 NOTE — ASSESSMENT & PLAN NOTE
Hepatomegaly. Probable pseudolesion in medial segment of left lobe.  Nonocclusive thrombus in branches of right hepatic vein.    - hematology consulted: Appreciate recs.  Checking MRI of abdomen with contrast  -For now he has been started on full dose Lovenox per hematology recs and anticipate transition to DOAC on discharge.  - MRI abdomen with contrast ordered and pending

## 2025-05-12 ENCOUNTER — APPOINTMENT (INPATIENT)
Dept: RADIOLOGY | Facility: HOSPITAL | Age: 55
DRG: 871 | End: 2025-05-12
Attending: PHYSICIAN ASSISTANT
Payer: OTHER MISCELLANEOUS

## 2025-05-12 PROBLEM — A41.9 SEPSIS (CMS/HCC): Status: ACTIVE | Noted: 2025-05-12

## 2025-05-12 LAB
ALBUMIN SERPL-MCNC: 2.9 G/DL (ref 3.5–5.7)
ALP SERPL-CCNC: 58 IU/L (ref 34–125)
ALT SERPL-CCNC: 5 IU/L (ref 7–52)
ANION GAP SERPL CALC-SCNC: 5 MEQ/L (ref 3–15)
AST SERPL-CCNC: 15 IU/L (ref 13–39)
BACTERIA BLD CULT: NORMAL
BILIRUB DIRECT SERPL-MCNC: <0.1 MG/DL
BILIRUB SERPL-MCNC: 0.3 MG/DL (ref 0.3–1.2)
BUN SERPL-MCNC: 14 MG/DL (ref 7–25)
CALCIUM SERPL-MCNC: 8 MG/DL (ref 8.6–10.3)
CHLORIDE SERPL-SCNC: 108 MEQ/L (ref 98–107)
CO2 SERPL-SCNC: 25 MEQ/L (ref 21–31)
CREAT SERPL-MCNC: 0.6 MG/DL (ref 0.7–1.3)
EGFRCR SERPLBLD CKD-EPI 2021: >60 ML/MIN/1.73M*2
ERYTHROCYTE [DISTWIDTH] IN BLOOD BY AUTOMATED COUNT: 16.5 % (ref 11.6–14.4)
GLUCOSE SERPL-MCNC: 95 MG/DL (ref 70–99)
HCT VFR BLD AUTO: 28.2 % (ref 40.1–51)
HGB BLD-MCNC: 8.9 G/DL (ref 13.7–17.5)
MCH RBC QN AUTO: 29.4 PG (ref 28–33.2)
MCHC RBC AUTO-ENTMCNC: 31.6 G/DL (ref 32.2–36.5)
MCV RBC AUTO: 93.1 FL (ref 83–98)
PLATELET # BLD AUTO: 469 K/UL (ref 150–350)
PMV BLD AUTO: 9.1 FL (ref 9.4–12.4)
POTASSIUM SERPL-SCNC: 4.4 MEQ/L (ref 3.5–5.1)
PROT SERPL-MCNC: 5.8 G/DL (ref 6–8.2)
RBC # BLD AUTO: 3.03 M/UL (ref 4.5–5.8)
SODIUM SERPL-SCNC: 138 MEQ/L (ref 136–145)
WBC # BLD AUTO: 8.98 K/UL (ref 3.8–10.5)

## 2025-05-12 PROCEDURE — 25800000 HC PHARMACY IV SOLUTIONS: Performed by: STUDENT IN AN ORGANIZED HEALTH CARE EDUCATION/TRAINING PROGRAM

## 2025-05-12 PROCEDURE — 63700000 HC SELF-ADMINISTRABLE DRUG

## 2025-05-12 PROCEDURE — 99233 SBSQ HOSP IP/OBS HIGH 50: CPT | Performed by: HOSPITALIST

## 2025-05-12 PROCEDURE — 80076 HEPATIC FUNCTION PANEL: CPT | Performed by: STUDENT IN AN ORGANIZED HEALTH CARE EDUCATION/TRAINING PROGRAM

## 2025-05-12 PROCEDURE — A9573: HCPCS | Mod: JZ | Performed by: PHYSICIAN ASSISTANT

## 2025-05-12 PROCEDURE — 21400000 HC ROOM AND CARE CCU/INTERMEDIATE

## 2025-05-12 PROCEDURE — 63600000 HC DRUGS/DETAIL CODE: Mod: JZ | Performed by: STUDENT IN AN ORGANIZED HEALTH CARE EDUCATION/TRAINING PROGRAM

## 2025-05-12 PROCEDURE — 36415 COLL VENOUS BLD VENIPUNCTURE: CPT | Performed by: HOSPITALIST

## 2025-05-12 PROCEDURE — 85027 COMPLETE CBC AUTOMATED: CPT | Performed by: HOSPITALIST

## 2025-05-12 PROCEDURE — 63600000 HC DRUGS/DETAIL CODE: Performed by: PHYSICIAN ASSISTANT

## 2025-05-12 PROCEDURE — 63700000 HC SELF-ADMINISTRABLE DRUG: Performed by: HOSPITALIST

## 2025-05-12 PROCEDURE — 80048 BASIC METABOLIC PNL TOTAL CA: CPT | Performed by: HOSPITALIST

## 2025-05-12 PROCEDURE — 74183 MRI ABD W/O CNTR FLWD CNTR: CPT

## 2025-05-12 PROCEDURE — A9579 GAD-BASE MR CONTRAST NOS,1ML: HCPCS | Mod: JZ | Performed by: PHYSICIAN ASSISTANT

## 2025-05-12 PROCEDURE — 63600000 HC DRUGS/DETAIL CODE: Mod: JZ | Performed by: HOSPITALIST

## 2025-05-12 RX ORDER — LORAZEPAM 2 MG/ML
1 INJECTION INTRAMUSCULAR ONCE
Status: COMPLETED | OUTPATIENT
Start: 2025-05-12 | End: 2025-05-12

## 2025-05-12 RX ORDER — LORAZEPAM 1 MG/1
1 TABLET ORAL ONCE
Status: DISCONTINUED | OUTPATIENT
Start: 2025-05-12 | End: 2025-05-12

## 2025-05-12 RX ADMIN — LORAZEPAM 1 MG: 2 INJECTION INTRAMUSCULAR; INTRAVENOUS at 14:35

## 2025-05-12 RX ADMIN — OXYCODONE HYDROCHLORIDE 5 MG: 5 TABLET ORAL at 06:18

## 2025-05-12 RX ADMIN — ENOXAPARIN SODIUM 50 MG: 100 INJECTION SUBCUTANEOUS at 12:14

## 2025-05-12 RX ADMIN — CEFTAZIDIME 2 G: 2 INJECTION, POWDER, FOR SOLUTION INTRAVENOUS at 15:56

## 2025-05-12 RX ADMIN — OXYCODONE HYDROCHLORIDE 5 MG: 5 TABLET ORAL at 15:56

## 2025-05-12 RX ADMIN — ACETAMINOPHEN 650 MG: 325 TABLET ORAL at 09:47

## 2025-05-12 RX ADMIN — ENOXAPARIN SODIUM 50 MG: 100 INJECTION SUBCUTANEOUS at 23:49

## 2025-05-12 RX ADMIN — GADOPICLENOL 5.2 ML: 485.1 INJECTION INTRAVENOUS at 15:20

## 2025-05-12 RX ADMIN — CEFTAZIDIME 2 G: 2 INJECTION, POWDER, FOR SOLUTION INTRAVENOUS at 06:18

## 2025-05-12 RX ADMIN — LORAZEPAM 1 MG: 2 INJECTION INTRAMUSCULAR; INTRAVENOUS at 14:05

## 2025-05-12 RX ADMIN — COLLAGENASE SANTYL 1 APPLICATION: 250 OINTMENT TOPICAL at 09:45

## 2025-05-12 RX ADMIN — CEFTAZIDIME 2 G: 2 INJECTION, POWDER, FOR SOLUTION INTRAVENOUS at 22:05

## 2025-05-12 RX ADMIN — TRAZODONE HYDROCHLORIDE 25 MG: 50 TABLET ORAL at 23:50

## 2025-05-12 RX ADMIN — OXYCODONE HYDROCHLORIDE 5 MG: 5 TABLET ORAL at 01:53

## 2025-05-12 RX ADMIN — OXYCODONE HYDROCHLORIDE 5 MG: 5 TABLET ORAL at 22:05

## 2025-05-12 ASSESSMENT — COGNITIVE AND FUNCTIONAL STATUS - GENERAL
WALKING IN HOSPITAL ROOM: 1 - TOTAL
STANDING UP FROM CHAIR USING ARMS: 1 - TOTAL
CLIMB 3 TO 5 STEPS WITH RAILING: 1 - TOTAL
WALKING IN HOSPITAL ROOM: 1 - TOTAL
CLIMB 3 TO 5 STEPS WITH RAILING: 1 - TOTAL
MOVING TO AND FROM BED TO CHAIR: 2 - A LOT
STANDING UP FROM CHAIR USING ARMS: 1 - TOTAL
MOVING TO AND FROM BED TO CHAIR: 3 - A LITTLE

## 2025-05-12 NOTE — PROGRESS NOTES
[facility]: I spoke with Master from [employer]. She is agreeable to cost per visit for [facility]. I called Avril at [facility] to see if they can take Pt, she said she has to go to a meeting right now but she will check and call me back. I will continue to follow. Marcella Mccormack RN     Addendum: I heard back from Avril at [facility], they can accept Pt for home physician.services. I notified Kizzy BARFIELD of above. Marcella Mccormack RN

## 2025-05-12 NOTE — PROGRESS NOTES
[facility] Medicine     Daily Progress Note                SUBJECTIVE   Interval History: Patient seen and examined at bedside. Denies any significant pain. Anxious about MRI.     OBJECTIVE      Vital signs in last 24 hours:  Temp:  [36.3 °C (97.4 °F)-36.9 °C (98.4 °F)] 36.9 °C (98.4 °F)  Heart Rate:  [63-75] 74  Resp:  [14-16] 16  BP: (121-139)/(58-71) 121/58    Intake/Output Summary (Last 24 hours) at 5/12/2025 1551  Last data filed at 5/12/2025 1215  Gross per 24 hour   Intake 200 ml   Output 2600 ml   Net -2400 ml         PHYSICAL EXAMINATION      Physical Exam  General: NAD, calm, pleasant  HEENT: atraumatic  Cardiovascular: RRR, no murmurs; S1/S2+  Pulmonary: CTAB; no rales, rhonchi or wheezing  Gi: Soft, ostomy  Ext: L BKA  Neuro: Alert and oriented x3; no sensory or motor deficits  Psych: Calm, appropriate answers       LINES, CATHETERS, DRAINS, AIRWAYS, AND WOUNDS   Lines, Drains, and Airways:  Wounds (agree with documentation and present on admission):  Peripheral IV (Adult) 05/09/25 Anterior;Left;Upper Arm (Active)   Number of days: 3       Colostomy LLQ (Active)   Number of days:        External Urinary Catheter (Active)   Number of days: 3       Wound Pressure Injury Right Ischial (Active)   Number of days: 4       Wound Right Heel (Active)   Number of days: 3       Wound Pressure Injury Sacrum (Active)   Number of days:        Wound Pressure Injury Left Sacrum (Active)   Number of days:          Comments:    LABS / IMAGING / TELE      Labs  Results from last 7 days   Lab Units 05/12/25  0448   WBC K/uL 8.98   HEMOGLOBIN g/dL 8.9*   HEMATOCRIT % 28.2*   PLATELETS K/uL 469*       Results from last 7 days   Lab Units 05/12/25  0448   SODIUM mEQ/L 138   POTASSIUM mEQ/L 4.4   CHLORIDE mEQ/L 108*   CO2 mEQ/L 25   BUN mg/dL 14   CREATININE mg/dL 0.6*   GLUCOSE mg/dL 95   CALCIUM mg/dL 8.0*         Imaging  I have independently reviewed the pertinent imaging from the last 24 hrs.  ASSESSMENT AND PLAN         Assessment & Plan    # Sepsis due to Pyelonephritis  -Events and imaging noted  - SOFA score: 0 (5/12/25)  - T: 36.9 ºC (5/12/25 11:15), HR: 74 (5/12/25 14:00), RR: 16 (5/12/25 11:15), T high: 37.3 ºC (5/8/25 16:46)  - currently on: ceftazidime 2 g iv q8h (05/08/25 - current)  -ID on board. Follow recommendations     # Anemia  - Hemoglobin 10.8 on admission. No signs of overt bleeding.  -Trend labs  - hemoglobin: 8.9 (5/12/25) up from 8.3 (5/11/25)  - reticulocyte index: 0.7 %, mean corpuscular volume: 93 (5/12/25)  - total iron: 44, ferritin: 110, total iron binding capacity: 204, transferrin saturation: 22 % (5/9/25)     # Abnormal finding on CT scan  -Hepatomegaly. Probable pseudolesion in medial segment of left lobe.  -Nonocclusive thrombus in branches of right hepatic vein.  -hematology consulted: Appreciate recs. Checking MRI of abdomen with contrast  -For now he has been started on full dose Lovenox per hematology recs and anticipate transition to DOAC on discharge.  - MRI abdomen with contrast ordered and pending     # Protein-calorie malnutrition (CMS/HCC)  -Appears malnourished BMI of 18.56  - Nutritional services consulted, appreciate recommendations  - BMI: 18.6 (5/9/25)     # Sacral wound  -Chronic sacral wounds. No evidence of acute infection  -CT: High-grade decubitus ulcers in bilateral buttocks with evidence of chronic     # osteomyelitis in bilateral ischial tuberosities and inferior pubic rami.  -ID consulted and Wound care consulted  - currently on: ceftazidime 2 g iv q8h (05/08/25 - current)     # Personal history of DVT (deep vein thrombosis)  -Has IVC filter in place  -CT abdomen pelvis with IV contrast: Nonocclusive thrombus in branches of right hepatic vein.  - Hematology consulted: Appreciate recommendations  - Currently on full dose Lovenox per hematology recommendations. Anticipate transition to DOAC on discharge. ?Hypercoagulable workup per hematology  VTE Assessment: Padua    VTE  Prophylaxis:  Current anticoagulants:  enoxaparin (LOVENOX) syringe 50 mg, subcutaneous, q12h (6a, 6p)      Code Status: Full Code      Estimated Discharge Date: 5/14/2025   Disposition Planning: home when stable    Spent more than 55 minutes with patient evaluation, discussion of current conditions with patient, RN, counseling and planning care.             Bibiana Garcia DO  5/12/2025

## 2025-05-12 NOTE — PROGRESS NOTES
"  Division of Infectious Diseases - Progress Note    Patient Name: Mehnaz Bronson MR#: [id number] : 1970 Admitted 2025  Date: 25 Time: 7:35 AM Author: Perla Whatley DO    Subjective:   Afebrile, VSS on RA.   WBC 8.98 this AM.   Pt reports feeling better today.  States he got claustrophobic in MRI machine.    Physical Examination:  Vital signs reviewed  Temp (24hrs), Av.6 °C (97.8 °F), Min:36.3 °C (97.4 °F), Max:36.8 °C (98.2 °F)    Visit Vitals  /69   Pulse 69   Temp 36.3 °C (97.4 °F) (Oral)   Resp 16   Ht 1.676 m (5' 5.98\")   Wt 52.2 kg (115 lb) Comment:    SpO2 94%   BMI 18.57 kg/m²     General: NAD  Head: NC/AT  Neurologic: awake and alert  Eyes: sclera anicteric, pale conjunctivae  ENT: oral mucosa pink and moist  Neck: soft, non tender  Heart: RRR, normal S1/S2, no m/r/g  Lungs: unlabored, clear to auscultation b/l  Abdomen: soft, + bowel sounds, non tender, + ostomy  Musculoskeletal: no fractures  Skin: warm, sacral wounds clean without evidence of surrounding infection    Recent Results (from the past 24 hours)   CBC    Collection Time: 25  4:48 AM   Result Value Ref Range    WBC 8.98 3.80 - 10.50 K/uL    RBC 3.03 (L) 4.50 - 5.80 M/uL    Hemoglobin 8.9 (L) 13.7 - 17.5 g/dL    Hematocrit 28.2 (L) 40.1 - 51.0 %    MCV 93.1 83.0 - 98.0 fL    MCH 29.4 28.0 - 33.2 pg    MCHC 31.6 (L) 32.2 - 36.5 g/dL    RDW 16.5 (H) 11.6 - 14.4 %    Platelets 469 (H) 150 - 350 K/uL    MPV 9.1 (L) 9.4 - 12.4 fL   Basic metabolic panel    Collection Time: 25  4:48 AM   Result Value Ref Range    Sodium 138 136 - 145 mEQ/L    Potassium 4.4 3.5 - 5.1 mEQ/L    Chloride 108 (H) 98 - 107 mEQ/L    CO2 25 21 - 31 mEQ/L    BUN 14 7 - 25 mg/dL    Creatinine 0.6 (L) 0.7 - 1.3 mg/dL    Glucose 95 70 - 99 mg/dL    Calcium 8.0 (L) 8.6 - 10.3 mg/dL    eGFR >60.0 >=60.0 mL/min/1.73m*2    Anion Gap 5 3 - 15 mEQ/L     Microbiology Results       Procedure Component Value Units Date/Time    " Urine culture Urine, Clean Catch [[id number]]  (Normal) Collected: 05/10/25 1138    Specimen: Urine, Clean Catch Updated: 05/11/25 1227     Urine Culture No Growth (Limit of detection 1000 cfu/mL)    Blood Culture Blood, Venous [[id number]]  (Normal) Collected: 05/08/25 1954    Specimen: Blood, Venous Updated: 05/12/25 0000     Culture No growth at 72 hours    Urine culture Urine, Clean Catch [[id number]] Collected: 05/08/25 1844    Specimen: Urine, Clean Catch Updated: 05/09/25 1852     Urine Culture Probable contaminants, suggest recollection      >=100,000 cfu/mL Mixed Growth    Blood Culture Blood, Venous [[id number]]  (Normal) Collected: 05/08/25 1715    Specimen: Blood, Venous Updated: 05/11/25 2300     Culture No growth at 72 hours          Anti-infectives (From admission, onward)      Start     Dose/Rate Route Frequency Ordered Stop    05/09/25 1430  cefTAZidime (FORTAZ) IVPB 2 g/100 mL NSS vial in bag         2 g  200 mL/hr over 30 Minutes intravenous Every 8 hours interval 05/09/25 1344              Assessment:   #Sepsis without shock: improving, in setting of complicated UTI/pyelonephritis. Initial urine culture mixed/contaminated  #Pyelonephritis: CT imaging suggestive of pyelonephritis. UA with pyuria but culture mixed  #Right hepatic vein thrombus: no thrombus seen on MRI abdomen  #Sacral wound: looks clean, does not appear acutely infected. Wound care following  Estimated Creatinine Clearance: 106.3 mL/min (A) (by C-G formula based on SCr of 0.6 mg/dL (L)).    Plan:   -Continue ceftazidime 2 g Q8 hours  -Follow up on pending microbiology    Perla Whatley DO  [phone]    5/12/2025 7:35 AM;

## 2025-05-12 NOTE — PLAN OF CARE
Problem: Adult Inpatient Plan of Care  Goal: Plan of Care Review  Outcome: Progressing  Flowsheets (Taken 5/12/2025 7640)  Progress: improving  Outcome Evaluation:   Pain controlled with tylenol and oxycodone   wound care performed   MRI completed after premedicating with ativan   [place] cath in place, adequate urine output   receiving IV abx   tolerating diet   flatus and stool per ostomy   transferred from bed to wheelchair ax1  Plan of Care Reviewed With: patient

## 2025-05-12 NOTE — PLAN OF CARE
Care Coordination Discharge Plan Note     Discharge Needs Assessment  Concerns to be Addressed: discharge planning, care coordination/care conferences  Current Discharge Risk: chronically ill, dependent with mobility/activities of daily living    Anticipated Discharge Plan  Anticipated Discharge Disposition: home with assistance, home with home health  Type of Home Care Services: home health aide      Patient Choice  Offered/Gave Vendor List: yes  Patient and/or patient guardian/advocate was made aware of their right to choose a provider. A list of eligible providers was presented and reviewed with the patient and/or patient guardian/advocate in written and/or verbal form. The list delineates providers in the patient’s desired geographic area who are participating in the Medicare program and/or providers contracted with the patient’s primary insurance. The Medicare list and quality ratings were obtained from the Medicare.gov [medicare.gov] website.    ---------------------------------------------------------------------------------------------------------------------    Interdisciplinary Discharge Plan Review:  Participants:, nursing    Concerns Comments: per DCPR pt not medically clear for d/c home    Discharge Plan:   Disposition/Destination:   /    Discharge Facility:    Community Resources:      Discharge Transportation:  Is Out of Hospital DNR needed at Discharge: no  Does patient need discharge transport?  No  DCP: Home w/ [facility]    CC following.

## 2025-05-13 LAB
ANION GAP SERPL CALC-SCNC: 5 MEQ/L (ref 3–15)
BACTERIA BLD CULT: NORMAL
BASOPHILS # BLD: 0.05 K/UL (ref 0.01–0.1)
BASOPHILS NFR BLD: 0.5 %
BUN SERPL-MCNC: 14 MG/DL (ref 7–25)
CALCIUM SERPL-MCNC: 8.5 MG/DL (ref 8.6–10.3)
CHLORIDE SERPL-SCNC: 107 MEQ/L (ref 98–107)
CO2 SERPL-SCNC: 27 MEQ/L (ref 21–31)
CREAT SERPL-MCNC: 0.6 MG/DL (ref 0.7–1.3)
DIFFERENTIAL METHOD BLD: ABNORMAL
EGFRCR SERPLBLD CKD-EPI 2021: >60 ML/MIN/1.73M*2
EOSINOPHIL # BLD: 0.43 K/UL (ref 0.04–0.54)
EOSINOPHIL NFR BLD: 4.5 %
ERYTHROCYTE [DISTWIDTH] IN BLOOD BY AUTOMATED COUNT: 16.9 % (ref 11.6–14.4)
GLUCOSE SERPL-MCNC: 87 MG/DL (ref 70–99)
HCT VFR BLD AUTO: 29 % (ref 40.1–51)
HGB BLD-MCNC: 9.2 G/DL (ref 13.7–17.5)
IMM GRANULOCYTES # BLD AUTO: 0.04 K/UL (ref 0–0.08)
IMM GRANULOCYTES NFR BLD AUTO: 0.4 %
LYMPHOCYTES # BLD: 2.69 K/UL (ref 1.2–3.5)
LYMPHOCYTES NFR BLD: 28.1 %
MCH RBC QN AUTO: 29.8 PG (ref 28–33.2)
MCHC RBC AUTO-ENTMCNC: 31.7 G/DL (ref 32.2–36.5)
MCV RBC AUTO: 93.9 FL (ref 83–98)
MONOCYTES # BLD: 0.68 K/UL (ref 0.3–1)
MONOCYTES NFR BLD: 7.1 %
NEUTROPHILS # BLD: 5.69 K/UL (ref 1.7–7)
NEUTS SEG NFR BLD: 59.4 %
NRBC BLD-RTO: 0 %
PLATELET # BLD AUTO: 443 K/UL (ref 150–350)
PMV BLD AUTO: 8.9 FL (ref 9.4–12.4)
POTASSIUM SERPL-SCNC: 4.3 MEQ/L (ref 3.5–5.1)
RBC # BLD AUTO: 3.09 M/UL (ref 4.5–5.8)
SODIUM SERPL-SCNC: 139 MEQ/L (ref 136–145)
WBC # BLD AUTO: 9.58 K/UL (ref 3.8–10.5)

## 2025-05-13 PROCEDURE — 63700000 HC SELF-ADMINISTRABLE DRUG: Performed by: NURSE PRACTITIONER

## 2025-05-13 PROCEDURE — 63700000 HC SELF-ADMINISTRABLE DRUG

## 2025-05-13 PROCEDURE — 85025 COMPLETE CBC W/AUTO DIFF WBC: CPT | Performed by: HOSPITALIST

## 2025-05-13 PROCEDURE — 80048 BASIC METABOLIC PNL TOTAL CA: CPT | Performed by: HOSPITALIST

## 2025-05-13 PROCEDURE — 63600000 HC DRUGS/DETAIL CODE: Performed by: HOSPITALIST

## 2025-05-13 PROCEDURE — 63600000 HC DRUGS/DETAIL CODE: Mod: JZ | Performed by: STUDENT IN AN ORGANIZED HEALTH CARE EDUCATION/TRAINING PROGRAM

## 2025-05-13 PROCEDURE — 12000000 HC ROOM AND CARE MED/SURG

## 2025-05-13 PROCEDURE — 36415 COLL VENOUS BLD VENIPUNCTURE: CPT | Performed by: HOSPITALIST

## 2025-05-13 PROCEDURE — 25800000 HC PHARMACY IV SOLUTIONS: Performed by: STUDENT IN AN ORGANIZED HEALTH CARE EDUCATION/TRAINING PROGRAM

## 2025-05-13 PROCEDURE — 63700000 HC SELF-ADMINISTRABLE DRUG: Performed by: HOSPITALIST

## 2025-05-13 PROCEDURE — 99233 SBSQ HOSP IP/OBS HIGH 50: CPT | Performed by: HOSPITALIST

## 2025-05-13 RX ORDER — HEPARIN SODIUM 5000 [USP'U]/ML
5000 INJECTION, SOLUTION INTRAVENOUS; SUBCUTANEOUS EVERY 8 HOURS
Status: DISCONTINUED | OUTPATIENT
Start: 2025-05-13 | End: 2025-05-14 | Stop reason: HOSPADM

## 2025-05-13 RX ADMIN — HEPARIN SODIUM 5000 UNITS: 5000 INJECTION, SOLUTION INTRAVENOUS; SUBCUTANEOUS at 13:36

## 2025-05-13 RX ADMIN — CEFTAZIDIME 2 G: 2 INJECTION, POWDER, FOR SOLUTION INTRAVENOUS at 13:36

## 2025-05-13 RX ADMIN — COLLAGENASE SANTYL 1 APPLICATION: 250 OINTMENT TOPICAL at 09:16

## 2025-05-13 RX ADMIN — OXYCODONE HYDROCHLORIDE 5 MG: 5 TABLET ORAL at 19:20

## 2025-05-13 RX ADMIN — OXYCODONE HYDROCHLORIDE 5 MG: 5 TABLET ORAL at 13:40

## 2025-05-13 RX ADMIN — CEFTAZIDIME 2 G: 2 INJECTION, POWDER, FOR SOLUTION INTRAVENOUS at 05:32

## 2025-05-13 RX ADMIN — ACETAMINOPHEN 650 MG: 325 TABLET ORAL at 09:16

## 2025-05-13 RX ADMIN — OXYCODONE HYDROCHLORIDE 5 MG: 5 TABLET ORAL at 05:32

## 2025-05-13 RX ADMIN — TRAZODONE HYDROCHLORIDE 25 MG: 50 TABLET ORAL at 23:07

## 2025-05-13 RX ADMIN — HEPARIN SODIUM 5000 UNITS: 5000 INJECTION, SOLUTION INTRAVENOUS; SUBCUTANEOUS at 22:27

## 2025-05-13 RX ADMIN — CEFTAZIDIME 2 G: 2 INJECTION, POWDER, FOR SOLUTION INTRAVENOUS at 22:28

## 2025-05-13 ASSESSMENT — COGNITIVE AND FUNCTIONAL STATUS - GENERAL
WALKING IN HOSPITAL ROOM: 1 - TOTAL
MOVING TO AND FROM BED TO CHAIR: 3 - A LITTLE
CLIMB 3 TO 5 STEPS WITH RAILING: 1 - TOTAL
WALKING IN HOSPITAL ROOM: 1 - TOTAL
STANDING UP FROM CHAIR USING ARMS: 1 - TOTAL
MOVING TO AND FROM BED TO CHAIR: 3 - A LITTLE
STANDING UP FROM CHAIR USING ARMS: 1 - TOTAL
CLIMB 3 TO 5 STEPS WITH RAILING: 1 - TOTAL

## 2025-05-13 NOTE — PROGRESS NOTES
"Nutrition Reassessment    Goals:  Patient will consume at least % of estimated nutritional needs with PO intake    Recommendation/Interventions:  Continue Regular diet.  Discontinue Glucerna per Patient request, and instead try Magic Cup BID (290 calories/serving, 9 grams protein/serving).  Suggest MVI with minerals.  Encouraged adequate PO intake at mealtimes.  Will monitor PO intake, diet order, weights, medications, labs, skin integrity, plan of care.      Subjective:  Patient seen for follow-up Assessment, had omelet this AM and toast, reported chicken, rice, vegetable with Dinner last night.  Doesn't like the Glucerna shakes, not drinking, wants discontinued.  Discussed overall feeling \"much better, eating more\" since admission.  No new weight.  Continues with multiple pressure injuries, photos reviewed.  +BM 5/13 documented via Colostomy.  Needs at least 0937-9069 calories (30 kcal/kg), and at least 75 grams protein (1.5 grams/kg), to support wound healing, and overall nutritional status, weight.  Has visible loss of muscle mass and body fat around Temporalis, Orbital/Buccal, Clavicle, Deltoid, Trapezius, Biceps/Triceps, Ribs, and met criteria for Malnutrition on admission.  Suspect meeting approximately 75% of estimated nutritional needs at this time.     Adult Nutrition Assessment - 05/13/25 0916          Charting Type    Nutrition Charting Type Nutrition Reassessment     RD Assessment Date 05/13/25     Nutrition Status Classification Severe nutritional compromise     RD Preferred Follow Up Date 5/14-5/17     Time Spent (Minutes) 30        Nutrition/Diet History    Intake (%) other (see comments)   reported ate about \"3/4\" of omelet, toast this AM    Factors Affecting Nutritional Intake other (see comments)   at this time, none, appetite improved, denied nausea       Labs/Procedures/Meds    Lab Results Reviewed reviewed     Lab Results Comments 5/12 noted        Diagnostic Tests/Procedures    " Diagnostic Test/Procedure Reviewed reviewed        Medications    Pertinent Medications Reviewed reviewed, pertinent     Pertinent Medications Comments Fortaz, Oxycodone        Physical Findings    Overall Physical Appearance loss of muscle mass;loss of subcutaneous fat;underweight;paraplegia     Gastrointestinal colostomy     Skin pressure injury (P)    R Ischial Unstageable wound, L Sacral Unstageable, Sacral Stage 2, R Heel wound       Nutrition Order    Nutrition Order meets nutritional requirements (P)      Nutrition Order Comments Regular, strawberry Glucerna (P)         PES Statement    Nutrition Diagnosis Malnutrition (P)      Related To: Decreased ability to consume sufficient energy (P)      As Evidenced By: estimated energy intakes PTA meeting <75% of estimated needs for at least past month,  moderate loss of muscle mass and body fat (P)      Nutritional Needs Met? Other (comment) (P)    current intakes likely meeting approximately 75% of estimated needs

## 2025-05-13 NOTE — PROGRESS NOTES
Hospital Medicine     Daily Progress Note                SUBJECTIVE   Interval History: Patient seen and examined at bedside. Denies CP, SOB, nausea, vomiting.      OBJECTIVE      Vital signs in last 24 hours:  Temp:  [36.4 °C (97.5 °F)-36.7 °C (98.1 °F)] 36.7 °C (98.1 °F)  Heart Rate:  [61-80] 61  Resp:  [14-16] 16  BP: (119-128)/(62-70) 128/65    Intake/Output Summary (Last 24 hours) at 5/13/2025 1602  Last data filed at 5/13/2025 1225  Gross per 24 hour   Intake --   Output 2400 ml   Net -2400 ml         PHYSICAL EXAMINATION      Physical Exam  General: NAD, calm, pleasant  HEENT: atraumatic  Cardiovascular: RRR, no murmurs; S1/S2+  Pulmonary: CTAB; no rales, rhonchi or wheezing  Gi: Soft, ostomy  Ext: L BKA  Neuro: Alert and oriented x3; no sensory or motor deficits  Psych: Calm, appropriate answers     LINES, CATHETERS, DRAINS, AIRWAYS, AND WOUNDS   Lines, Drains, and Airways:  Wounds (agree with documentation and present on admission):  Colostomy LLQ (Active)   Number of days:        External Urinary Catheter (Active)   Number of days: 4       Wound Pressure Injury Right Ischial (Active)   Number of days: 5       Wound Right Heel (Active)   Number of days: 4       Wound Pressure Injury Sacrum (Active)   Number of days:        Wound Pressure Injury Left Sacrum (Active)   Number of days:          Comments:    LABS / IMAGING / TELE      Labs  Results from last 7 days   Lab Units 05/13/25  0507   WBC K/uL 9.58   HEMOGLOBIN g/dL 9.2*   HEMATOCRIT % 29.0*   PLATELETS K/uL 443*     Results from last 7 days   Lab Units 05/13/25  0507   SODIUM mEQ/L 139   POTASSIUM mEQ/L 4.3   CHLORIDE mEQ/L 107   CO2 mEQ/L 27   BUN mg/dL 14   CREATININE mg/dL 0.6*   GLUCOSE mg/dL 87   CALCIUM mg/dL 8.5*         Imaging  I have independently reviewed the pertinent imaging from the last 24 hrs.      ASSESSMENT AND PLAN        Assessment & Plan      # Sepsis due to Pyelonephritis  - Events and imaging noted  - SOFA score: 0  (5/13/25)  - T: 36.7 ºC / HR: 61 / RR: 16 (5/13/25 15:31), T high: 37.3 ºC (5/8/25 16:46)  - currently on: ceftazidime 2 g iv q8h (05/08/25 - current)  -ID on board. Follow recommendations     # Anemia  -Trend labs  - hemoglobin: 9.2 (5/13/25) up from 8.9 (5/12/25), low 8.3 (5/11/25)  - reticulocyte index: 0.7 %, mean corpuscular volume: 94 (5/13/25)  - total iron: 44, ferritin: 110, total iron binding capacity: 204, transferrin saturation: 22 % (5/9/25)     # Abnormal finding on CT scan  -Hepatomegaly. Probable pseudolesion in medial segment of left lobe.  -Nonocclusive thrombus in branches of right hepatic vein.  -hematology consulted: Appreciate recs. Checked MRI of abdomen with contrast. No clear sign of clot  -Will stop therapeutic lovenox     # Protein-calorie malnutrition (CMS/HCC)  -Appears malnourished BMI of 18.56  - Nutritional services consulted, appreciate recommendations  - BMI: 18.6 (5/9/25)     # Sacral wound  -Chronic sacral wounds. No evidence of acute infection  -CT: High-grade decubitus ulcers in bilateral buttocks with evidence of chronic     # Osteomyelitis in bilateral ischial tuberosities and inferior pubic rami.  - -ID consulted and Wound care consulted  - currently on: ceftazidime 2 g iv q8h (05/08/25 - current)     # Personal history of DVT (deep vein thrombosis)  -Has IVC filter in place  -CT abdomen pelvis with IV contrast: Nonocclusive thrombus in branches of right hepatic vein.  -MRI without clear sign of clot  - Hematology consulted: Appreciate recommendations. No longer needs AC. Will stop lovenox and start DVT PPX    VTE Assessment: Pad[place]    VTE Prophylaxis:  Current anticoagulants:  heparin (porcine) 5,000 unit/mL injection 5,000 Units, subcutaneous, q8h ABEBA      Code Status: Full Code      Estimated Discharge Date: 5/14/2025   Disposition Planning: home when stable. Pending ID recs    Spent more than 55 minutes with patient evaluation, discussion of current conditions with  patient, RN and Case management, counseling and planning care.             Bibiana Garcia DO  5/13/2025

## 2025-05-13 NOTE — PROGRESS NOTES
[facility]: Received referral for home care from Kizzy BARFIELD. I spoke with Pt at bedside, Pt agreeable to home SN from  [facility]. I also let Pt know I set him up with [facility], they will send a physician/PA/NP to his home to see him. Pt agreeable. I will continue to follow. Marcella Mccormack RN

## 2025-05-13 NOTE — PROGRESS NOTES
[facility] Daily Progress Note       SUBJECTIVE     Interval History:   MRI A/P neg for hepatic vein clot- discussed w/ radiology who now states that prior CT results probably more c/w mixing artifact and not true clot in retrospect  Pt doing well, feels much improved compared to admission  No bleeding present     All other systems were reviewed and are negative other than as stated in the HPI    Objective     Vital signs in last 24 hours:  Temp:  [36.4 °C (97.5 °F)-36.9 °C (98.4 °F)] 36.7 °C (98.1 °F)  Heart Rate:  [66-80] 66  Resp:  [14-16] 14  BP: (119-128)/(58-70) 119/62    Current Medications:  Current Facility-Administered Medications   Medication Dose Route Frequency Provider Last Rate Last Admin    acetaminophen (TYLENOL) tablet 650 mg  650 mg oral q4h PRN Lalo Pan DO   650 mg at 05/12/25 0947    camphor-methyl salicyl-menthoL (MUSCLE RUB) cream   Topical 3x daily PRN Chase Clarke MD        cefTAZidime (FORTAZ) IVPB 2 g/100 mL NSS vial in bag  2 g intravenous q8h INT Perla Whatley DO   Stopped at 05/13/25 0602    collagenase (SANTYL) 250 unit/gram ointment 1 Application  1 Application Topical Daily Tracy Velez CRNP   1 Application at 05/12/25 0945    glucose chewable tablet 15-30 g of dextrose  15-30 g of dextrose oral PRN Lalo Pan DO        Or    dextrose 40 % oral gel 15-30 g of dextrose  15-30 g of dextrose oral PRN Lalo Pan DO        Or    glucagon (GLUCAGEN) injection 1 mg  1 mg intramuscular PRN Lalo Pan DO        Or    dextrose 50 % in water (D50) injection 12.5 g  25 mL intravenous PRN Lalo Pan DO        enoxaparin (LOVENOX) syringe 50 mg  1 mg/kg subcutaneous q12h (6a, 6p) Bindu Gallardo PA C   50 mg at 05/12/25 7496    ondansetron ODT (ZOFRAN-ODT) disintegrating tablet 4 mg  4 mg oral q8h PRN Arthur Mixon MD   4 mg at 05/11/25 0546    Or    ondansetron (ZOFRAN) injection 4 mg  4 mg intravenous q8h PRN Arthur Mixon MD        oxyCODONE  (ROXICODONE) immediate release tablet 10 mg  10 mg oral q4h PRN Chase Clarke MD        oxyCODONE (ROXICODONE) immediate release tablet 5 mg  5 mg oral q4h PRN Chase Clarke MD   5 mg at 05/13/25 0532    traZODone (DESYREL) tablet 25 mg  25 mg oral Nightly PRN Zenaida Roper CRNP   25 mg at 05/12/25 2350      OBJECTIVE     Physical Exam:    General: Awake and alert. Chronically ill appearing  Skin: Warm and dry.    HEENT: Normocephalic.  Sclera anicteric.  Conjunctiva pink. Mucous membranes moist.   Heart: Regular rate and rhythm.  Lungs: Clear to auscultation.   Abdomen: Soft.  No distension. Ostomy in place .   Extremities: L BKA   Psychiatric: Normal affect.  Neurologic: Memory intact. +paraplegia.    VTE Assessment: I have reassessed and the patient's VTE risk and treatment plan is appropriate.     LABS & IMAGING     Labs  Lab Results   Component Value Date    WBC 9.58 05/13/2025    HGB 9.2 (L) 05/13/2025    HCT 29.0 (L) 05/13/2025    MCV 93.9 05/13/2025     (H) 05/13/2025     Lab Results   Component Value Date    DIFFTYPE Auto 05/13/2025    MONOABS 0.68 05/13/2025    EOSABS 0.43 05/13/2025    BASOABS 0.05 05/13/2025     Lab Results   Component Value Date    GLUCOSE 87 05/13/2025    CALCIUM 8.5 (L) 05/13/2025     05/13/2025    K 4.3 05/13/2025    CO2 27 05/13/2025     05/13/2025    BUN 14 05/13/2025    CREATININE 0.6 (L) 05/13/2025     Lab Results   Component Value Date    ALT 5 (L) 05/12/2025    AST 15 05/12/2025    ALKPHOS 58 05/12/2025    BILITOT 0.3 05/12/2025     Imaging  I have reviewed the Imaging from the last 24 hrs.  MRI ABDOMEN WITH AND WITHOUT CONTRAST   Final Result   IMPRESSION:      Mild hepatomegaly.  Subtle regional altered intrahepatic perfusion in the medial   left hepatic lobe.  No MRI findings of underlying liver mass.  Patent hepatic   and portal veins.      X-RAY SKULL LESS THAN 4 VIEWS   Final Result   IMPRESSION:   No radiopaque foreign body.         /      X-RAY  CERVICAL SPINE 2 OR 3 VIEWS   Final Result   IMPRESSION:   No radiopaque foreign body.         /      X-RAY NECK SOFT TISSUE   Final Result   IMPRESSION:   No radiopaque foreign body.         /      CT ANGIOGRAPHY CHEST PULMONARY EMBOLISM WITH IV CONTRAST   Final Result   IMPRESSION:      1.  No evidence of pulmonary embolism.   2.  No acute pulmonary abnormality. Mild centrilobular emphysema. Mild to   moderate paraseptal emphysema. 3-4 mm nodule in left upper lobe, for which   follow-up imaging is recommended as per patient risk factors and current   Fleischner Society guidelines.   3.  See additional findings above.      CC: Lung Nodule Navigator      CT ABDOMEN PELVIS WITH IV CONTRAST   Final Result   IMPRESSION:      1.  Moderate to severe left renal atrophy with multifocal cortical scarring.   Compensatory hypertrophy of right kidney. Heterogeneous or striated nephrograms   could represent acute pyelonephritis, acute tubular necrosis or other pathology.   2.  Mild to moderate bladder wall thickening and mucosal hyperenhancement   suggest cystitis or chronic bladder outlet obstruction. Small bladder   diverticulum. Correlation with urinalysis is recommended.   3.  Hepatomegaly. Probable pseudolesion in medial segment of left lobe.   Nonocclusive thrombus in branches of right hepatic vein.   4.  Status post left hemicolectomy with left abdominal colostomy and fat and   bowel-containing parastomal hernia, as above.   5.  Left adrenal nodule, possibly adenoma but not characterized.   6.  Bilateral common and external iliac lymphadenopathy could be reactive or   metastatic. Correlation with patient's clinical history is required.   7.  High-grade decubitus ulcers in bilateral buttocks with evidence of chronic   osteomyelitis in bilateral ischial tuberosities and inferior pubic rami.   8.  See additional findings above.      Direct comparison with abdominopelvic CT from outside institution, if available,   is  recommended to confirm stability of multiple findings. Otherwise, abdominal   MRI with and without contrast is recommended on a nonemergent outpatient basis   for further evaluation.      X-RAY CHEST 1 VIEW   Final Result   IMPRESSION:      No acute cardiopulmonary abnormality on frontal view of chest.      ECG 12 lead   Final Result         ASSESSMENT & PLAN     In summary, this is a 53 y/o male w/ paraplegia, L BKA presenting w/ UTI. Consulted for hepatic vein thrombus.     # Hepatic Vein Thrombus  - CT A/P shows: Hepatomegaly. Probable pseudolesion in medial segment of left lobe.  Nonocclusive age in-determinant thrombus in branches of right hepatic vein.  - prior provoked DVT I/s/o trauma/ surgery and IVC filter. No other PMH of VTE, no known family VTE     Plan  - MRI A/P ultimately completed 5/12 and was neg for hepatic vein clot  - Discussed w/ radiology- clot seen on CT was likely contrast artifact etc  - given no e/o clot on advanced imaging, no treatment dose acx is needed  - cont ppx acx for DVT ppx  - we sent limited hypercoag w/u with APLAS, MPN cascade (given elevated WBC and plts) before MRI resulted, will follow these up but expect them to be neg, and now questionably relevant since no clotting was present     # Anemia  - Hgb 8.9, normocytic, suspect underproductive primarily  - suspect maybe anemia of chronic disease (chronic sacral ulcer?)  - no iron deficiency, b12, folate deficiency, RBC hemolysis  - trend. Transfuse for Hgb <8 - suspect this will improve when his inflammatory issues are also improved      # Leukocytosis  # UTI  - presenting w/ fever, chills, back ache consistent w/ prior UTIs  - labs w/ leukocytosis - improved now   - UA + nitrites, +3 leuk esterase. Ucx w/ mixed growth  - on abx, ID consulted     All questions answered. Will follow.     Bindu Gallardo PA-C acting as scribe for Luis Lam MD

## 2025-05-13 NOTE — PLAN OF CARE
Problem: Adult Inpatient Plan of Care  Goal: Plan of Care Review  Outcome: Progressing  Flowsheets (Taken 5/13/2025 6882)  Progress: improving  Plan of Care Reviewed With: patient     Problem: Malnutrition  Goal: Improved Nutritional Intake  Outcome: Progressing

## 2025-05-13 NOTE — PROGRESS NOTES
"  Division of Infectious Diseases - Progress Note    Patient Name: Mehnaz Bronson MR#: [id number] : 1970 Admitted 2025  Date: 25 Time: 7:45 AM Author: Perla Whatley DO    Subjective:   Afebrile, VSS on RA.   WBC 9.58,  this AM.   Pt reports feeling better.     Physical Examination:  Vital signs reviewed  Temp (24hrs), Av.7 °C (98 °F), Min:36.4 °C (97.5 °F), Max:36.9 °C (98.4 °F)    Visit Vitals  /62 (BP Location: Right upper arm, Patient Position: Lying)   Pulse 72   Temp 36.7 °C (98.1 °F) (Oral)   Resp 14   Ht 1.676 m (5' 5.98\")   Wt 52.2 kg (115 lb) Comment:    SpO2 97%   BMI 18.57 kg/m²     General: NAD  Head: NC/AT  Neurologic: awake and alert  Eyes: sclera anicteric, pale conjunctivae  ENT: oral mucosa pink and moist  Neck: soft, non tender  Heart: RRR, normal S1/S2, no m/r/g  Lungs: unlabored, clear to auscultation b/l  Abdomen: soft, + bowel sounds, non tender, + ostomy  Musculoskeletal: no fractures, L BKA  Skin: warm, no rashes    Recent Results (from the past 24 hours)   CBC and Differential    Collection Time: 25  5:07 AM   Result Value Ref Range    WBC 9.58 3.80 - 10.50 K/uL    RBC 3.09 (L) 4.50 - 5.80 M/uL    Hemoglobin 9.2 (L) 13.7 - 17.5 g/dL    Hematocrit 29.0 (L) 40.1 - 51.0 %    MCV 93.9 83.0 - 98.0 fL    MCH 29.8 28.0 - 33.2 pg    MCHC 31.7 (L) 32.2 - 36.5 g/dL    RDW 16.9 (H) 11.6 - 14.4 %    Platelets 443 (H) 150 - 350 K/uL    MPV 8.9 (L) 9.4 - 12.4 fL    Differential Type Auto     nRBC 0.0 <=0.0 %    Immature Granulocytes 0.4 %    Neutrophils 59.4 %    Lymphocytes 28.1 %    Monocytes 7.1 %    Eosinophils 4.5 %    Basophils 0.5 %    Immature Granulocytes, Absolute 0.04 0.00 - 0.08 K/uL    Neutrophils, Absolute 5.69 1.70 - 7.00 K/uL    Lymphocytes, Absolute 2.69 1.20 - 3.50 K/uL    Monocytes, Absolute 0.68 0.30 - 1.00 K/uL    Eosinophils, Absolute 0.43 0.04 - 0.54 K/uL    Basophils, Absolute 0.05 0.01 - 0.10 K/uL   Basic metabolic panel "    Collection Time: 05/13/25  5:07 AM   Result Value Ref Range    Sodium 139 136 - 145 mEQ/L    Potassium 4.3 3.5 - 5.1 mEQ/L    Chloride 107 98 - 107 mEQ/L    CO2 27 21 - 31 mEQ/L    BUN 14 7 - 25 mg/dL    Creatinine 0.6 (L) 0.7 - 1.3 mg/dL    Glucose 87 70 - 99 mg/dL    Calcium 8.5 (L) 8.6 - 10.3 mg/dL    eGFR >60.0 >=60.0 mL/min/1.73m*2    Anion Gap 5 3 - 15 mEQ/L     Microbiology Results       Procedure Component Value Units Date/Time    Urine culture Urine, Clean Catch [[id number]]  (Normal) Collected: 05/10/25 1138    Specimen: Urine, Clean Catch Updated: 05/11/25 1227     Urine Culture No Growth (Limit of detection 1000 cfu/mL)    Blood Culture Blood, Venous [[id number]]  (Normal) Collected: 05/08/25 1954    Specimen: Blood, Venous Updated: 05/13/25 0000     Culture No growth at 96 hours    Urine culture Urine, Clean Catch [[id number]] Collected: 05/08/25 1844    Specimen: Urine, Clean Catch Updated: 05/09/25 1852     Urine Culture Probable contaminants, suggest recollection      >=100,000 cfu/mL Mixed Growth    Blood Culture Blood, Venous [[id number]]  (Normal) Collected: 05/08/25 1715    Specimen: Blood, Venous Updated: 05/12/25 2300     Culture No growth at 96 hours          Anti-infectives (From admission, onward)      Start     Dose/Rate Route Frequency Ordered Stop    05/09/25 1430  cefTAZidime (FORTAZ) IVPB 2 g/100 mL NSS vial in bag         2 g  200 mL/hr over 30 Minutes intravenous Every 8 hours interval 05/09/25 1344              Assessment:   #Sepsis without shock: improving, in setting of complicated UTI/pyelonephritis. Initial urine culture mixed/contaminated  #Pyelonephritis: CT imaging suggestive of pyelonephritis. UA with pyuria but culture mixed  #Right hepatic vein thrombus: no thrombus seen on MRI abdomen  #Sacral wound: looks clean, does not appear acutely infected. Wound care following  Estimated Creatinine Clearance: 106.3 mL/min (A) (by C-G formula based on SCr of 0.6 mg/dL (L)).    Plan:    -Continue ceftazidime 2 g Q8 hours to complete total of 7 days. Today is day #5.   -If patient is to be discharged prior to completion of antibiotics, can complete course with PO levofloxacin 750 mg Q24 hours. No contraindication to fluoroquinolone use (QTC < 500, no aortic aneurysm).  -Infectious disease will sign off at this time. Please call with questions or concerns. Thank you for allowing us to participate in the care of this patient.     Perla Whatley DO  [phone]    5/13/2025 7:45 AM

## 2025-05-14 VITALS
RESPIRATION RATE: 16 BRPM | OXYGEN SATURATION: 95 % | DIASTOLIC BLOOD PRESSURE: 59 MMHG | TEMPERATURE: 97.8 F | SYSTOLIC BLOOD PRESSURE: 127 MMHG | HEART RATE: 65 BPM | BODY MASS INDEX: 18.48 KG/M2 | HEIGHT: 66 IN | WEIGHT: 115 LBS

## 2025-05-14 LAB
ANION GAP SERPL CALC-SCNC: 5 MEQ/L (ref 3–15)
APTT IMM NP PPP: 47 SEC (ref 31–44)
BASOPHILS # BLD: 0.04 K/UL (ref 0.01–0.1)
BASOPHILS NFR BLD: 0.5 %
BUN SERPL-MCNC: 15 MG/DL (ref 7–25)
CALCIUM SERPL-MCNC: 8.9 MG/DL (ref 8.6–10.3)
CHLORIDE SERPL-SCNC: 109 MEQ/L (ref 98–107)
CO2 SERPL-SCNC: 27 MEQ/L (ref 21–31)
CONFIRM APTT STACLOT: NEGATIVE
CREAT SERPL-MCNC: 0.6 MG/DL (ref 0.7–1.3)
DIFFERENTIAL METHOD BLD: ABNORMAL
EGFRCR SERPLBLD CKD-EPI 2021: >60 ML/MIN/1.73M*2
EOSINOPHIL # BLD: 0.45 K/UL (ref 0.04–0.54)
EOSINOPHIL NFR BLD: 5.3 %
ERYTHROCYTE [DISTWIDTH] IN BLOOD BY AUTOMATED COUNT: 17.3 % (ref 11.6–14.4)
GLUCOSE SERPL-MCNC: 95 MG/DL (ref 70–99)
HCT VFR BLD AUTO: 29 % (ref 40.1–51)
HGB BLD-MCNC: 9.1 G/DL (ref 13.7–17.5)
IMM GRANULOCYTES # BLD AUTO: 0.03 K/UL (ref 0–0.08)
IMM GRANULOCYTES NFR BLD AUTO: 0.4 %
LA PPP-IMP: NORMAL
LYMPHOCYTES # BLD: 1.86 K/UL (ref 1.2–3.5)
LYMPHOCYTES NFR BLD: 21.8 %
MCH RBC QN AUTO: 29.9 PG (ref 28–33.2)
MCHC RBC AUTO-ENTMCNC: 31.4 G/DL (ref 32.2–36.5)
MCV RBC AUTO: 95.4 FL (ref 83–98)
MONOCYTES # BLD: 0.62 K/UL (ref 0.3–1)
MONOCYTES NFR BLD: 7.3 %
NEUTROPHILS # BLD: 5.55 K/UL (ref 1.7–7)
NEUTS SEG NFR BLD: 64.7 %
NRBC BLD-RTO: 0 %
PLATELET # BLD AUTO: 476 K/UL (ref 150–350)
PMV BLD AUTO: 8.6 FL (ref 9.4–12.4)
POTASSIUM SERPL-SCNC: 4.6 MEQ/L (ref 3.5–5.1)
PROTHROMBIN TIME: 14 SEC (ref 12.2–14.5)
RBC # BLD AUTO: 3.04 M/UL (ref 4.5–5.8)
SCREEN APTT: 60 SEC (ref 31–44)
SCREEN DRVVT: 39 SEC (ref 31–44)
SODIUM SERPL-SCNC: 141 MEQ/L (ref 136–145)
WBC # BLD AUTO: 8.55 K/UL (ref 3.8–10.5)

## 2025-05-14 PROCEDURE — 80048 BASIC METABOLIC PNL TOTAL CA: CPT | Performed by: HOSPITALIST

## 2025-05-14 PROCEDURE — 36415 COLL VENOUS BLD VENIPUNCTURE: CPT | Performed by: HOSPITALIST

## 2025-05-14 PROCEDURE — 85025 COMPLETE CBC W/AUTO DIFF WBC: CPT | Performed by: HOSPITALIST

## 2025-05-14 PROCEDURE — 99239 HOSP IP/OBS DSCHRG MGMT >30: CPT | Performed by: HOSPITALIST

## 2025-05-14 PROCEDURE — 25800000 HC PHARMACY IV SOLUTIONS: Performed by: STUDENT IN AN ORGANIZED HEALTH CARE EDUCATION/TRAINING PROGRAM

## 2025-05-14 PROCEDURE — 63700000 HC SELF-ADMINISTRABLE DRUG

## 2025-05-14 PROCEDURE — 63600000 HC DRUGS/DETAIL CODE: Performed by: HOSPITALIST

## 2025-05-14 PROCEDURE — 63600000 HC DRUGS/DETAIL CODE: Mod: JZ | Performed by: STUDENT IN AN ORGANIZED HEALTH CARE EDUCATION/TRAINING PROGRAM

## 2025-05-14 PROCEDURE — 63700000 HC SELF-ADMINISTRABLE DRUG: Performed by: HOSPITALIST

## 2025-05-14 RX ORDER — LEVOFLOXACIN 750 MG/1
750 TABLET ORAL DAILY
Qty: 2 TABLET | Refills: 0 | Status: SHIPPED | OUTPATIENT
Start: 2025-05-14 | End: 2025-05-16

## 2025-05-14 RX ORDER — OXYCODONE HYDROCHLORIDE 5 MG/1
5 TABLET ORAL EVERY 8 HOURS PRN
Qty: 5 TABLET | Refills: 0 | Status: SHIPPED | OUTPATIENT
Start: 2025-05-14 | End: 2025-05-19

## 2025-05-14 RX ADMIN — ACETAMINOPHEN 650 MG: 325 TABLET ORAL at 10:38

## 2025-05-14 RX ADMIN — OXYCODONE HYDROCHLORIDE 5 MG: 5 TABLET ORAL at 05:04

## 2025-05-14 RX ADMIN — CEFTAZIDIME 2 G: 2 INJECTION, POWDER, FOR SOLUTION INTRAVENOUS at 06:20

## 2025-05-14 RX ADMIN — COLLAGENASE SANTYL 1 APPLICATION: 250 OINTMENT TOPICAL at 08:06

## 2025-05-14 RX ADMIN — HEPARIN SODIUM 5000 UNITS: 5000 INJECTION, SOLUTION INTRAVENOUS; SUBCUTANEOUS at 06:20

## 2025-05-14 ASSESSMENT — COGNITIVE AND FUNCTIONAL STATUS - GENERAL
WALKING IN HOSPITAL ROOM: 1 - TOTAL
STANDING UP FROM CHAIR USING ARMS: 1 - TOTAL
MOVING TO AND FROM BED TO CHAIR: 3 - A LITTLE
CLIMB 3 TO 5 STEPS WITH RAILING: 1 - TOTAL

## 2025-05-14 NOTE — PLAN OF CARE
Care Coordination Discharge Plan Summary    Admission Assessment Summary    General Information  Readmission Within the last 30 days: no previous admission in last 30 days  Does patient have a :    Patient-Specific Goals (include timeframe): home    Living Arrangements  Arrived From: home  Current Living Arrangements: home  People in Home: parent(s)  Home Accessibility:    Living Arrangement Comments: 0STE    Social Drivers of Health - Screenings  Housing Stability  In the last 12 months, was there a time when you were not able to pay the mortgage or rent on time?: No  In the past 12 months, how many times have you moved where you were living?: 1  At any time in the past 12 months, were you homeless or living in a shelter (including now)?: Yes  Utility Access  In the past 12 months has the electric, gas, oil, or water company threatened to shut off services in your home?: No  Transportation Needs  In the past 12 months, has lack of transportation kept you from medical appointments or from getting medications?: No  In the past 12 months, has lack of transportation kept you from meetings, work, or from getting things needed for daily living?: No    Functional Status Prior to Admission  Assistive Device/Animal Currently Used at Home: power chair  Functional Status Comments: w/c for mobility  IADL Comments: ind with ADL but currently working on hiring aids with unique    Discharge Needs Assessment    Concerns to be Addressed: discharge planning, care coordination/care conferences  Current Discharge Risk: chronically ill, dependent with mobility/activities of daily living  Anticipated Changes Related to Illness: inability to care for self    Discharge Plan Summary    Patient Choice  Offered/Gave Vendor List: yes  Patient and/or patient guardian/advocate was made aware of their right to choose a provider. A list of eligible providers was presented and reviewed with the patient and/or patient  guardian/advocate in written and/or verbal form. The list delineates providers in the patient’s desired geographic area who are participating in the Medicare program and/or providers contracted with the patient’s primary insurance. The Medicare list and quality ratings were obtained from the Medicare.gov [medicare.gov] website.    Concerns / Comments: per DCPR pt medically clear for d/c Marcella([facility]) notified of d/c    Discharge Plan:  Disposition/Destination:   /         Connection to Community  Not applicable  Community Resources:      Discharge Transportation:  Is Out of Hospital DNR needed at Discharge: no  Does patient need discharge transport?  No  DCP: Home w/ [facility]    CC following.

## 2025-05-14 NOTE — DISCHARGE SUMMARY
[facility] Medicine    Inpatient Discharge Summary        BRIEF OVERVIEW     Patient: Mehnaz Bronson  Admission Date: 2025   : 1970 Discharge Date: 2025       PCP: Christopher Watson, No Pcp  Disposition:      Destination:       Attending Provider: Bibiana Garcia DO Attending phys phone: [phone]    Code Status At Discharge: Full Code        ASSESSMENT AND PLAN     Assessment & Plan       # Sepsis due to Pyelonephritis  - Events and imaging noted  - SOFA score: 0 (25)  - T: 36.7 ºC / HR: 61 / RR: 16 (25 15:31), T high: 37.3 ºC (25 16:46)  -ID on board. Follow recommendations. Plan to transition to Levaquin to finish course. 2 more days  - SIRS: HR: 98 (25 16:46), WBC: 19.3 (25)  - T: 36.6 ºC / HR: 65 / RR: 16 (25 8:15), T high: 37.3 ºC (25 16:46)  - white blood cell count: 8.6 (25) down from last abnormal 12.3 (25), high 19.3 (25)     # Anemia  --Trend labs outpatient  - hemoglobin: 9.2 (25) up from 8.9 (25), low 8.3 (25)  - reticulocyte index: 0.7 %, mean corpuscular volume: 94 (25)  - total iron: 44, ferritin: 110, total iron binding capacity: 204, transferrin saturation: 22 % (25)     # Abnormal finding on CT scan  -Hepatomegaly. Probable pseudolesion in medial segment of left lobe.  -Nonocclusive thrombus in branches of right hepatic vein on CT  -Hematology consulted: Appreciate recs. Checked MRI of abdomen with contrast. No clear sign of clot  -Will stop therapeutic lovenox     # Protein-calorie malnutrition (CMS/HCC)  -Appears malnourished BMI of 18.56  - Nutritional services consulted, appreciate recommendations  - BMI: 18.6 (25)     # Sacral wound  -Chronic sacral wounds. No evidence of acute infection  -CT: High-grade decubitus ulcers in bilateral buttocks with evidence of chronic     # Osteomyelitis in bilateral ischial tuberosities and inferior pubic rami.  - -ID consulted and Wound care consulted  - currently on:  ceftazidime 2 g iv q8h (05/08/25 - current). To finish course with levaquin     # Personal history of DVT (deep vein thrombosis)  -Has IVC filter in place  -CT abdomen pelvis with IV contrast: Nonocclusive thrombus in branches of right hepatic vein.  -MRI without clear sign of clot  - Hematology consulted: Appreciate recommendations. No longer needs AC. Will stop lovenox and start DVT PPX    Brief Hospital Course  This is a 54 y.o. year-old male admitted on 5/8/2025 with No Principal Problem: There is no principal problem currently on the Problem List. Please update the Problem List and refresh..    54-year-old male with past medical history paraplegia status post left BKA, chronic sacral wound, colostomy, osteomyelitis of sacral ulcer presented to [facility] with intermittent fever, chills, nausea, and lower back discomfort.  He was found to have UTI and was started on antibiotics.  He was admitted for sepsis due to suspected pyelonephritis.  ID was consulted.  Wound care was consulted for his sacral wound but did not appear to be infected.  CT on admission showed concern for potential right hepatic vein clot and hematology was consulted.  Hematology recommended MRI with contrast.  It did not show an acute clot and hematology recommended not continuing anticoagulation.  Infectious disease recommended finishing his antibiotic course with oral Levaquin and he is now hemodynamically stable for discharge.  He has been instructed to follow-up with his PCP as well as hematology at discharge.        Exam on Day of Discharge  Temp:  [36.6 °C (97.8 °F)-36.8 °C (98.2 °F)] 36.6 °C (97.8 °F)  Heart Rate:  [61-80] 65  Resp:  [16] 16  BP: (127-132)/(59-65) 127/59    Physical Exam  General: NAD, calm, pleasant  HEENT: atraumatic  Cardiovascular: RRR, no murmurs; S1/S2+  Pulmonary: CTAB; no rales, rhonchi or wheezing  Gi: Soft, ostomy  Ext: L BKA  Neuro: Alert and oriented x3; no sensory or motor deficits  Psych: Calm,  "appropriate answers    DISCHARGE MEDICATIONS         Medication List        START taking these medications      collagenase ointment  Commonly known as: SANTYL  Start taking on: May 15, 2025  Apply 1 Application topically daily.  Dose: 1 Application     levoFLOXacin 750 mg tablet  Commonly known as: LEVAQUIN  Take 1 tablet (750 mg total) by mouth daily for 2 days.  Dose: 750 mg     oxyCODONE 5 mg immediate release tablet  Commonly known as: ROXICODONE  Take 1 tablet (5 mg total) by mouth every 8 (eight) hours as needed for pain (moderate pain) for up to 5 days.  Dose: 5 mg             INSTRUCTIONS AND FOLLOW-UP     Instructions for after discharge       Call provider for:  difficulty breathing      Call provider for:  persistent dizziness or light-headedness, headache, or visual disturbances      Call provider for:  persistent nausea or vomiting      Call provider for:  rash      Call provider for:  redness, tenderness, or signs of infection (pain, swelling, redness, odor or green/yellow discharge around incision site)      Call provider for:  severe uncontrolled pain      Call provider for:  temperature >100.4      Call provider for: increased shortness of breath; weight gain (3 pounds/1 day or 5 pounds/1 week); more swelling in legs, ankles, feet or belly; need to sleep with extra pillows or in a chair.      Discharge Diet      Diet Type / Texture: Regular    Normal Activity as Tolerated      Review additional discharge directions in \"Instructions\" section on the last page              Important Issues to Address in Follow-Up  Follow up with PCP and hematology    Scheduled Appointments  Encounter Information    This patient does not currently have any appointments scheduled.         Recommended Follow-up Visits   Follow-Up Providers       Pt States, No Pcp   Relationship: PCP - General        Follow up in 1 week(s)    Luis Lam MD   Specialty: Hematology and Oncology, Oncology, Internal Medicine, Hematology, " Medical Oncology        Follow up in 1 month(s)              After Discharge Care                Home Medical Care       [facility]   Home Health Services                                Test Results Pending at Discharge  Pending Inpatient Labs       Order Current Status    Beta-2 glycoprotein antibodies In process    Cardiolipin antibody In process    Lupus Anticoagulant Interpretation In process    Lupus Anticoagulant w/ Intrpn In process    [facility] send out- mpn mutation cascade - Miscellaneous Test In process            LABS AND IMAGING   Pertinent Labs  Results from last 7 days   Lab Units 05/14/25  0533   WBC K/uL 8.55   HEMOGLOBIN g/dL 9.1*   HEMATOCRIT % 29.0*   PLATELETS K/uL 476*     Results from last 7 days   Lab Units 05/14/25  0533   SODIUM mEQ/L 141   POTASSIUM mEQ/L 4.6   CHLORIDE mEQ/L 109*   CO2 mEQ/L 27   BUN mg/dL 15   CREATININE mg/dL 0.6*   GLUCOSE mg/dL 95   CALCIUM mg/dL 8.9         Pertinent Imaging  MRI ABDOMEN WITH AND WITHOUT CONTRAST  Result Date: 5/12/2025  IMPRESSION: Mild hepatomegaly.  Subtle regional altered intrahepatic perfusion in the medial left hepatic lobe.  No MRI findings of underlying liver mass.  Patent hepatic and portal veins.    X-RAY CERVICAL SPINE 2 OR 3 VIEWS  Result Date: 5/10/2025  IMPRESSION: No radiopaque foreign body. /    X-RAY NECK SOFT TISSUE  Result Date: 5/10/2025  IMPRESSION: No radiopaque foreign body. /    X-RAY SKULL LESS THAN 4 VIEWS  Result Date: 5/10/2025  IMPRESSION: No radiopaque foreign body. /    CT ABDOMEN PELVIS WITH IV CONTRAST  Result Date: 5/8/2025  IMPRESSION: 1.  Moderate to severe left renal atrophy with multifocal cortical scarring. Compensatory hypertrophy of right kidney. Heterogeneous or striated nephrograms could represent acute pyelonephritis, acute tubular necrosis or other pathology. 2.  Mild to moderate bladder wall thickening and mucosal hyperenhancement suggest cystitis or chronic bladder outlet  obstruction. Small bladder diverticulum. Correlation with urinalysis is recommended. 3.  Hepatomegaly. Probable pseudolesion in medial segment of left lobe. Nonocclusive thrombus in branches of right hepatic vein. 4.  Status post left hemicolectomy with left abdominal colostomy and fat and bowel-containing parastomal hernia, as above. 5.  Left adrenal nodule, possibly adenoma but not characterized. 6.  Bilateral common and external iliac lymphadenopathy could be reactive or metastatic. Correlation with patient's clinical history is required. 7.  High-grade decubitus ulcers in bilateral buttocks with evidence of chronic osteomyelitis in bilateral ischial tuberosities and inferior pubic rami. 8.  See additional findings above. Direct comparison with abdominopelvic CT from outside institution, if available, is recommended to confirm stability of multiple findings. Otherwise, abdominal MRI with and without contrast is recommended on a nonemergent outpatient basis for further evaluation.    X-RAY CHEST 1 VIEW  Result Date: 5/8/2025  IMPRESSION: No acute cardiopulmonary abnormality on frontal view of chest.    CT ANGIOGRAPHY CHEST PULMONARY EMBOLISM WITH IV CONTRAST  Result Date: 5/8/2025  IMPRESSION: 1.  No evidence of pulmonary embolism. 2.  No acute pulmonary abnormality. Mild centrilobular emphysema. Mild to moderate paraseptal emphysema. 3-4 mm nodule in left upper lobe, for which follow-up imaging is recommended as per patient risk factors and current Fleischner Society guidelines. 3.  See additional findings above. CC: Lung Nodule Navigator        OTHER SERVICES PROVIDED   Consults During Admission  IP CONSULT TO WOUND OSTOMY CONTINENCE  IP CONSULT TO INFECTIOUS DISEASE  IP CONSULT TO NUTRITION SERVICES  IP CONSULT TO HEMATOLOGY/ONCOLOGY    Procedures Performed  none      Thank you for allowing the Division of Hospital Medicine to care for your patient.        Spent more than 35 minutes in patient evaluation,  discussing with RN/ID/heme, physical evaluation, and coordination of discharge and care.

## 2025-05-15 LAB
B2 GLYCOPROT1 IGA SERPL IA-ACNC: <9.4 SAU
B2 GLYCOPROT1 IGG SERPL IA-ACNC: <9.4 SGU
B2 GLYCOPROT1 IGM SERPL IA-ACNC: <9.4 SMU
CARDIOLIPIN IGA SER IA-ACNC: <9.4 APL U/ML
CARDIOLIPIN IGG SER IA-ACNC: <9.4 GPL U/ML
CARDIOLIPIN IGM SER IA-ACNC: <9.4 MPL U/ML

## 2025-05-22 LAB — LABORATORY REPORT: NORMAL
